# Patient Record
Sex: FEMALE | Race: WHITE | Employment: FULL TIME | URBAN - METROPOLITAN AREA
[De-identification: names, ages, dates, MRNs, and addresses within clinical notes are randomized per-mention and may not be internally consistent; named-entity substitution may affect disease eponyms.]

---

## 2018-01-25 ENCOUNTER — HOSPITAL ENCOUNTER (EMERGENCY)
Age: 43
Discharge: HOME | End: 2018-01-25
Attending: EMERGENCY MEDICINE | Admitting: EMERGENCY MEDICINE
Payer: SELF-PAY

## 2018-01-25 VITALS
TEMPERATURE: 98.2 F | WEIGHT: 152 LBS | BODY MASS INDEX: 24.43 KG/M2 | HEART RATE: 67 BPM | DIASTOLIC BLOOD PRESSURE: 72 MMHG | OXYGEN SATURATION: 100 % | SYSTOLIC BLOOD PRESSURE: 135 MMHG | HEIGHT: 66 IN | RESPIRATION RATE: 16 BRPM

## 2018-01-25 DIAGNOSIS — N30.00 ACUTE CYSTITIS WITHOUT HEMATURIA: Primary | ICD-10-CM

## 2018-01-25 LAB
ANION GAP SERPL CALC-SCNC: 9 MMOL/L (ref 3–18)
APPEARANCE UR: ABNORMAL
BACTERIA URNS QL MICRO: ABNORMAL /HPF
BILIRUB UR QL: NEGATIVE
BUN SERPL-MCNC: 8 MG/DL (ref 7–18)
BUN/CREAT SERPL: 17 (ref 12–20)
CALCIUM SERPL-MCNC: 8.6 MG/DL (ref 8.5–10.1)
CHLORIDE SERPL-SCNC: 108 MMOL/L (ref 100–108)
CO2 SERPL-SCNC: 23 MMOL/L (ref 21–32)
COLOR UR: YELLOW
CREAT SERPL-MCNC: 0.46 MG/DL (ref 0.6–1.3)
EPITH CASTS URNS QL MICRO: ABNORMAL /LPF (ref 0–5)
GLUCOSE SERPL-MCNC: 80 MG/DL (ref 74–99)
GLUCOSE UR STRIP.AUTO-MCNC: NEGATIVE MG/DL
HCG UR QL: NEGATIVE
HGB UR QL STRIP: NEGATIVE
KETONES UR QL STRIP.AUTO: NEGATIVE MG/DL
LEUKOCYTE ESTERASE UR QL STRIP.AUTO: ABNORMAL
NITRITE UR QL STRIP.AUTO: NEGATIVE
PH UR STRIP: 5.5 [PH] (ref 5–8)
POTASSIUM SERPL-SCNC: 4.5 MMOL/L (ref 3.5–5.5)
PROT UR STRIP-MCNC: NEGATIVE MG/DL
RBC #/AREA URNS HPF: 0 /HPF (ref 0–5)
SERVICE CMNT-IMP: NORMAL
SODIUM SERPL-SCNC: 140 MMOL/L (ref 136–145)
SP GR UR REFRACTOMETRY: 1.01 (ref 1–1.03)
UROBILINOGEN UR QL STRIP.AUTO: 1 EU/DL (ref 0.2–1)
WBC URNS QL MICRO: ABNORMAL /HPF (ref 0–4)
WET PREP GENITAL: NORMAL

## 2018-01-25 PROCEDURE — 99284 EMERGENCY DEPT VISIT MOD MDM: CPT

## 2018-01-25 PROCEDURE — 81001 URINALYSIS AUTO W/SCOPE: CPT | Performed by: EMERGENCY MEDICINE

## 2018-01-25 PROCEDURE — 80048 BASIC METABOLIC PNL TOTAL CA: CPT | Performed by: EMERGENCY MEDICINE

## 2018-01-25 PROCEDURE — 87491 CHLMYD TRACH DNA AMP PROBE: CPT | Performed by: EMERGENCY MEDICINE

## 2018-01-25 PROCEDURE — 87210 SMEAR WET MOUNT SALINE/INK: CPT | Performed by: EMERGENCY MEDICINE

## 2018-01-25 PROCEDURE — 81025 URINE PREGNANCY TEST: CPT | Performed by: EMERGENCY MEDICINE

## 2018-01-25 PROCEDURE — 74011250637 HC RX REV CODE- 250/637: Performed by: EMERGENCY MEDICINE

## 2018-01-25 RX ORDER — PHENAZOPYRIDINE HYDROCHLORIDE 100 MG/1
200 TABLET, FILM COATED ORAL ONCE
Status: COMPLETED | OUTPATIENT
Start: 2018-01-25 | End: 2018-01-25

## 2018-01-25 RX ORDER — ACETAMINOPHEN 500 MG
1000 TABLET ORAL
Status: COMPLETED | OUTPATIENT
Start: 2018-01-25 | End: 2018-01-25

## 2018-01-25 RX ORDER — PHENAZOPYRIDINE HYDROCHLORIDE 200 MG/1
200 TABLET, FILM COATED ORAL 3 TIMES DAILY
Qty: 6 TAB | Refills: 0 | Status: SHIPPED | OUTPATIENT
Start: 2018-01-25 | End: 2018-01-27

## 2018-01-25 RX ORDER — SULFAMETHOXAZOLE AND TRIMETHOPRIM 800; 160 MG/1; MG/1
1 TABLET ORAL 2 TIMES DAILY
Qty: 6 TAB | Refills: 0 | Status: SHIPPED | OUTPATIENT
Start: 2018-01-25 | End: 2018-01-28

## 2018-01-25 RX ADMIN — PHENAZOPYRIDINE HYDROCHLORIDE 200 MG: 100 TABLET ORAL at 14:39

## 2018-01-25 RX ADMIN — ACETAMINOPHEN 1000 MG: 500 TABLET ORAL at 14:39

## 2018-01-25 NOTE — ED PROVIDER NOTES
HPI Comments: Chief Complaint: dysuria  Duration:  3 days  Timing:  constant  Location: bladder  Quality: burning  Severity: moderate  Modifying Factors: worse with urination  Associated Symptoms: none    41 yo F c/o dysuria and frequency x 3 days. Has been taking pyridium and amoxicillin without improvement in sx. Would also like to be checked for STDs while she is here. Denies fever, n/v, back pain, abdominal pain. No other complaints. Patient is a 40 y.o. female presenting with urinary pain. Urinary Pain    Associated symptoms include frequency. Pertinent negatives include no chills and no vaginal discharge. Past Medical History:   Diagnosis Date    Anemia        History reviewed. No pertinent surgical history. History reviewed. No pertinent family history. Social History     Social History    Marital status: SINGLE     Spouse name: N/A    Number of children: N/A    Years of education: N/A     Occupational History    Not on file. Social History Main Topics    Smoking status: Never Smoker    Smokeless tobacco: Never Used    Alcohol use Not on file    Drug use: Not on file    Sexual activity: Not on file     Other Topics Concern    Not on file     Social History Narrative         ALLERGIES: Review of patient's allergies indicates no known allergies. Review of Systems   Constitutional: Negative for chills and fever. Genitourinary: Positive for dysuria and frequency. Negative for vaginal discharge. All other systems reviewed and are negative. Vitals:    01/25/18 1414   BP: 135/72   Pulse: 67   Resp: 16   Temp: 98.2 °F (36.8 °C)   SpO2: 100%   Weight: 68.9 kg (152 lb)   Height: 5' 6\" (1.676 m)            Physical Exam   Constitutional: She is oriented to person, place, and time. She appears well-developed and well-nourished. No distress. HENT:   Head: Normocephalic and atraumatic. Eyes: Conjunctivae are normal.   Neck: Normal range of motion. Neck supple. Cardiovascular: Normal rate, regular rhythm and normal heart sounds. Pulmonary/Chest: Effort normal and breath sounds normal. No respiratory distress. She has no wheezes. She has no rales. Genitourinary: Vagina normal and uterus normal. There is no tenderness or lesion on the right labia. There is no tenderness on the left labia. Cervix exhibits no motion tenderness. Right adnexum displays no tenderness and no fullness. Left adnexum displays no tenderness and no fullness. Genitourinary Comments: Tech present for exam   Musculoskeletal: Normal range of motion. Neurological: She is alert and oriented to person, place, and time. Skin: Skin is warm and dry. Psychiatric: She has a normal mood and affect. Her behavior is normal. Judgment and thought content normal.   Nursing note and vitals reviewed. MDM  Number of Diagnoses or Management Options  Acute cystitis without hematuria:     ED Course       Procedures    -------------------------------------------------------------------------------------------------------------------     EKG INTERPRETATIONS:      RADIOLOGY RESULTS:   No orders to display       LABORATORY RESULTS:  No results found for this or any previous visit (from the past 12 hour(s)). PROGRESS NOTES:    Pt well appearing and in NAD. UA c/w UTI. Other labs unremarkable. GC/chlam pending. Will d/h to f/u with PCP for further eval.     Lengthy D/W pt regarding possible worsening of pt's condition, need for follow up and strict ED return instructions for any worsening symptoms. DISPOSITION:  ED DIAGNOSIS & DISPOSITION INFORMATION  Diagnosis:   1.  Acute cystitis without hematuria          Disposition: home    Follow-up Information     Follow up With Details Comments [facility] Call To make a follow up appointment [address] EMERGENCY DEPT  Immediately if symptoms worsen 150 [facility] [address] [place]  [phone]          Discharge Medication List as of 1/25/2018  3:34 PM      START taking these medications    Details   trimethoprim-sulfamethoxazole (BACTRIM DS) 160-800 mg per tablet Take 1 Tab by mouth two (2) times a day for 3 days. , Print, Disp-6 Tab, R-0      phenazopyridine (PYRIDIUM) 200 mg tablet Take 1 Tab by mouth three (3) times daily for 2 days. , Print, Disp-6 Tab, R-0         CONTINUE these medications which have NOT CHANGED    Details   ferrous sulfate (IRON, FERROUS SULFATE,) 325 mg (65 mg iron) tablet Take 1 tablet by mouth three (3) times daily (with meals). , Print, Disp-60 tablet, R-0

## 2018-01-25 NOTE — DISCHARGE INSTRUCTIONS

## 2018-01-26 LAB
C TRACH RRNA SPEC QL NAA+PROBE: NEGATIVE
N GONORRHOEA RRNA SPEC QL NAA+PROBE: NEGATIVE
SPECIMEN SOURCE: NORMAL

## 2018-11-16 ENCOUNTER — HOSPITAL ENCOUNTER (EMERGENCY)
Age: 43
Discharge: HOME | End: 2018-11-16
Attending: EMERGENCY MEDICINE
Payer: SELF-PAY

## 2018-11-16 ENCOUNTER — APPOINTMENT (OUTPATIENT)
Dept: CT IMAGING | Age: 43
End: 2018-11-16
Attending: EMERGENCY MEDICINE
Payer: SELF-PAY

## 2018-11-16 VITALS
TEMPERATURE: 98.3 F | HEIGHT: 66 IN | SYSTOLIC BLOOD PRESSURE: 101 MMHG | DIASTOLIC BLOOD PRESSURE: 53 MMHG | OXYGEN SATURATION: 100 % | BODY MASS INDEX: 24.59 KG/M2 | WEIGHT: 153 LBS | RESPIRATION RATE: 20 BRPM | HEART RATE: 73 BPM

## 2018-11-16 DIAGNOSIS — D50.9 IRON DEFICIENCY ANEMIA, UNSPECIFIED IRON DEFICIENCY ANEMIA TYPE: Primary | ICD-10-CM

## 2018-11-16 DIAGNOSIS — Z51.89 ENCOUNTER FOR BLOOD TRANSFUSION: ICD-10-CM

## 2018-11-16 LAB
ANION GAP SERPL CALC-SCNC: 7 MMOL/L (ref 3–18)
APPEARANCE UR: CLEAR
BASOPHILS # BLD: 0 K/UL (ref 0–0.1)
BASOPHILS NFR BLD: 1 % (ref 0–2)
BILIRUB UR QL: NEGATIVE
BUN SERPL-MCNC: 6 MG/DL (ref 7–18)
BUN/CREAT SERPL: 11 (ref 12–20)
CALCIUM SERPL-MCNC: 8.7 MG/DL (ref 8.5–10.1)
CHLORIDE SERPL-SCNC: 106 MMOL/L (ref 100–108)
CO2 SERPL-SCNC: 27 MMOL/L (ref 21–32)
COLOR UR: YELLOW
CREAT SERPL-MCNC: 0.55 MG/DL (ref 0.6–1.3)
DIFFERENTIAL METHOD BLD: ABNORMAL
EOSINOPHIL # BLD: 0.3 K/UL (ref 0–0.4)
EOSINOPHIL NFR BLD: 6 % (ref 0–5)
ERYTHROCYTE [DISTWIDTH] IN BLOOD BY AUTOMATED COUNT: 25.9 % (ref 11.6–14.5)
GLUCOSE SERPL-MCNC: 84 MG/DL (ref 74–99)
GLUCOSE UR STRIP.AUTO-MCNC: NEGATIVE MG/DL
HCG SERPL QL: NEGATIVE
HCG UR QL: NEGATIVE
HCT VFR BLD AUTO: 20.4 % (ref 35–45)
HGB BLD-MCNC: 5.1 G/DL (ref 12–16)
HGB UR QL STRIP: NEGATIVE
KETONES UR QL STRIP.AUTO: NEGATIVE MG/DL
LEUKOCYTE ESTERASE UR QL STRIP.AUTO: NEGATIVE
LYMPHOCYTES # BLD: 1.1 K/UL (ref 0.9–3.6)
LYMPHOCYTES NFR BLD: 21 % (ref 21–52)
MCH RBC QN AUTO: 15.7 PG (ref 24–34)
MCHC RBC AUTO-ENTMCNC: 25 G/DL (ref 31–37)
MCV RBC AUTO: 63 FL (ref 74–97)
MONOCYTES # BLD: 0.4 K/UL (ref 0.05–1.2)
MONOCYTES NFR BLD: 7 % (ref 3–10)
NEUTS SEG # BLD: 3.4 K/UL (ref 1.8–8)
NEUTS SEG NFR BLD: 65 % (ref 40–73)
NITRITE UR QL STRIP.AUTO: NEGATIVE
PH UR STRIP: 7.5 [PH] (ref 5–8)
PLATELET # BLD AUTO: 348 K/UL (ref 135–420)
POTASSIUM SERPL-SCNC: 4.1 MMOL/L (ref 3.5–5.5)
PROT UR STRIP-MCNC: NEGATIVE MG/DL
RBC # BLD AUTO: 3.24 M/UL (ref 4.2–5.3)
SODIUM SERPL-SCNC: 140 MMOL/L (ref 136–145)
SP GR UR REFRACTOMETRY: <1.005 (ref 1–1.03)
TROPONIN I SERPL-MCNC: 0.02 NG/ML (ref 0–0.04)
UROBILINOGEN UR QL STRIP.AUTO: 0.2 EU/DL (ref 0.2–1)
WBC # BLD AUTO: 5.3 K/UL (ref 4.6–13.2)

## 2018-11-16 PROCEDURE — 96374 THER/PROPH/DIAG INJ IV PUSH: CPT

## 2018-11-16 PROCEDURE — 84703 CHORIONIC GONADOTROPIN ASSAY: CPT

## 2018-11-16 PROCEDURE — P9016 RBC LEUKOCYTES REDUCED: HCPCS

## 2018-11-16 PROCEDURE — 99285 EMERGENCY DEPT VISIT HI MDM: CPT

## 2018-11-16 PROCEDURE — 81003 URINALYSIS AUTO W/O SCOPE: CPT

## 2018-11-16 PROCEDURE — 77030013169 SET IV BLD ICUM -A

## 2018-11-16 PROCEDURE — 86920 COMPATIBILITY TEST SPIN: CPT

## 2018-11-16 PROCEDURE — 86901 BLOOD TYPING SEROLOGIC RH(D): CPT

## 2018-11-16 PROCEDURE — 80048 BASIC METABOLIC PNL TOTAL CA: CPT

## 2018-11-16 PROCEDURE — 85025 COMPLETE CBC W/AUTO DIFF WBC: CPT

## 2018-11-16 PROCEDURE — 84484 ASSAY OF TROPONIN QUANT: CPT

## 2018-11-16 PROCEDURE — 81025 URINE PREGNANCY TEST: CPT

## 2018-11-16 PROCEDURE — 36430 TRANSFUSION BLD/BLD COMPNT: CPT

## 2018-11-16 PROCEDURE — 93005 ELECTROCARDIOGRAM TRACING: CPT

## 2018-11-16 PROCEDURE — 70450 CT HEAD/BRAIN W/O DYE: CPT

## 2018-11-16 PROCEDURE — 74011250636 HC RX REV CODE- 250/636: Performed by: PHYSICIAN ASSISTANT

## 2018-11-16 RX ORDER — DIPHENHYDRAMINE HYDROCHLORIDE 50 MG/ML
25 INJECTION, SOLUTION INTRAMUSCULAR; INTRAVENOUS
Status: COMPLETED | OUTPATIENT
Start: 2018-11-16 | End: 2018-11-16

## 2018-11-16 RX ORDER — SODIUM CHLORIDE 9 MG/ML
250 INJECTION, SOLUTION INTRAVENOUS AS NEEDED
Status: DISCONTINUED | OUTPATIENT
Start: 2018-11-16 | End: 2018-11-16 | Stop reason: HOSPADM

## 2018-11-16 RX ORDER — LANOLIN ALCOHOL/MO/W.PET/CERES
325 CREAM (GRAM) TOPICAL
Qty: 60 TAB | Refills: 0 | Status: SHIPPED | OUTPATIENT
Start: 2018-11-16 | End: 2019-05-13

## 2018-11-16 RX ADMIN — DIPHENHYDRAMINE HYDROCHLORIDE 25 MG: 50 INJECTION, SOLUTION INTRAMUSCULAR; INTRAVENOUS at 19:08

## 2018-11-16 NOTE — ED PROVIDER NOTES
EMERGENCY DEPARTMENT HISTORY AND PHYSICAL EXAM 
 
Date: 11/16/2018 Patient Name: Connie Galindo History of Presenting Illness Chief Complaint Patient presents with  Dizziness  Palpitations History Provided By: Patient Chief Complaint: lightheadedness, fatigue, palpitations Duration: several days Timing:  acute on chronic Location: generalized Modifying Factors: has a history of anemia. Had a transfusion about 5 years ago when living in a [facility]. Takes 200 mg of a iron equivalent  From [facility]. Recent heavy period lasting for 7 days starting on November 2 Associated Symptoms: denies rectal bleeding, abd pain, chest pain, + mild SOB especially when moving around, no leg swelling, no syncope Additional History (Context): Connie Galindo is a 44 y.o. female with anemia who presents with C/O lightheadedness, generalized fatigue, SOB with exertion that began about three or four days ago. She has a history of iron deficiency anemia and has had a transfusion in the past -- about 5 years ago in [facility]. Denies any syncope. Admits to a heavy period that started on Nov 2 and lasted 7 days. Her symptoms started after completing her period. Denies chest pain, syncope. Denies rectal bleeding. PCP: MARILY Thurston Current Facility-Administered Medications Medication Dose Route Frequency Provider Last Rate Last Dose  
 0.9% sodium chloride infusion 250 mL  250 mL IntraVENous PRN [place], [place] Current Outpatient Medications Medication Sig Dispense Refill  ferrous sulfate (IRON, FERROUS SULFATE,) 325 mg (65 mg iron) tablet Take 1 Tab by mouth three (3) times daily (with meals). 60 Tab 0 Past History Past Medical History: 
Past Medical History:  
Diagnosis Date  Anemia  Anemia Past Surgical History: 
History reviewed. No pertinent surgical history. Family History: 
History reviewed. No pertinent family history. Social History: 
Social History Tobacco Use  Smoking status: Never Smoker  Smokeless tobacco: Never Used Substance Use Topics  Alcohol use: Not on file  Drug use: Not on file Allergies: 
No Known Allergies Review of Systems Review of Systems Constitutional: Positive for fatigue. Negative for chills and fever. Respiratory: Positive for shortness of breath. Negative for chest tightness and wheezing. Cardiovascular: Negative for chest pain, palpitations and leg swelling. Gastrointestinal: Negative for abdominal pain, anal bleeding, blood in stool, diarrhea, nausea and vomiting. Genitourinary: Hx of heavy vaginal bleeding Musculoskeletal: Negative. Skin: Negative. Neurological: Negative. All other systems reviewed and are negative. Physical Exam  
 
Vitals:  
 11/16/18 1630 11/16/18 1641 11/16/18 1700 11/16/18 1800 BP: 107/55  105/55 99/67 Pulse: 76  71 68 Resp: 19 16 16 Temp:  98.3 °F (36.8 °C) SpO2: 100%  100% 100% Weight:      
Height:      
 
Physical Exam  
Constitutional: She is oriented to person, place, and time. She appears well-developed and well-nourished. No distress. HENT:  
Head: Normocephalic and atraumatic. Eyes: EOM are normal. Pupils are equal, round, and reactive to light. Pale conjunctiva Neck: Neck supple. Cardiovascular: Normal rate and regular rhythm. Exam reveals no gallop and no friction rub. No murmur heard. Pulmonary/Chest: Effort normal and breath sounds normal. No respiratory distress. She has no wheezes. She has no rales. Abdominal: Soft. Bowel sounds are normal. She exhibits no distension and no mass. There is no tenderness. There is no rebound and no guarding. Genitourinary:  
Genitourinary Comments: Declines SLAVA Musculoskeletal: Normal range of motion. She exhibits no tenderness or deformity. Lymphadenopathy:  
  She has no cervical adenopathy. Neurological: She is alert and oriented to person, place, and time. No cranial nerve deficit. Skin: Skin is warm and dry. No rash noted. She is not diaphoretic. There is pallor. Psychiatric: She has a normal mood and affect. Her behavior is normal.  
Nursing note and vitals reviewed. Diagnostic Study Results Labs - Recent Results (from the past 12 hour(s)) EKG, 12 LEAD, INITIAL Collection Time: 11/16/18  2:54 PM  
Result Value Ref Range Ventricular Rate 81 BPM  
 Atrial Rate 81 BPM  
 P-R Interval 122 ms QRS Duration 84 ms Q-T Interval 380 ms QTC Calculation (Bezet) 441 ms Calculated P Axis 36 degrees Calculated R Axis 61 degrees Calculated T Axis 17 degrees Diagnosis Normal sinus rhythm Normal ECG No previous ECGs available CBC WITH AUTOMATED DIFF Collection Time: 11/16/18  3:16 PM  
Result Value Ref Range WBC 5.3 4.6 - 13.2 K/uL  
 RBC 3.24 (L) 4.20 - 5.30 M/uL HGB 5.1 (LL) 12.0 - 16.0 g/dL HCT 20.4 (L) 35.0 - 45.0 % MCV 63.0 (L) 74.0 - 97.0 FL  
 MCH 15.7 (L) 24.0 - 34.0 PG  
 MCHC 25.0 (L) 31.0 - 37.0 g/dL RDW 25.9 (H) 11.6 - 14.5 % PLATELET 851 965 - 736 K/uL NEUTROPHILS 65 40 - 73 % LYMPHOCYTES 21 21 - 52 % MONOCYTES 7 3 - 10 % EOSINOPHILS 6 (H) 0 - 5 % BASOPHILS 1 0 - 2 %  
 ABS. NEUTROPHILS 3.4 1.8 - 8.0 K/UL  
 ABS. LYMPHOCYTES 1.1 0.9 - 3.6 K/UL  
 ABS. MONOCYTES 0.4 0.05 - 1.2 K/UL  
 ABS. EOSINOPHILS 0.3 0.0 - 0.4 K/UL  
 ABS. BASOPHILS 0.0 0.0 - 0.1 K/UL  
 DF AUTOMATED    
TYPE & SCREEN Collection Time: 11/16/18  3:16 PM  
Result Value Ref Range Crossmatch Expiration 11/19/2018 ABO/Rh(D) A POSITIVE Antibody screen NEG   
 CALLED TO: IRINEO Escobar, AT 1618 ON 11/16/18 BY [id number] [facility] Unit number [id number] Blood component type  LR,1 Unit division 00 Status of unit ALLOCATED Crossmatch result Compatible Unit number [id number]  Blood component type  LR,1   
 Unit division 00 Status of unit ISSUED Crossmatch result Compatible METABOLIC PANEL, BASIC Collection Time: 11/16/18  3:16 PM  
Result Value Ref Range Sodium 140 136 - 145 mmol/L Potassium 4.1 3.5 - 5.5 mmol/L Chloride 106 100 - 108 mmol/L  
 CO2 27 21 - 32 mmol/L Anion gap 7 3.0 - 18 mmol/L Glucose 84 74 - 99 mg/dL BUN 6 (L) 7.0 - 18 MG/DL Creatinine 0.55 (L) 0.6 - 1.3 MG/DL  
 BUN/Creatinine ratio 11 (L) 12 - 20 GFR est AA >60 >60 ml/min/1.73m2 GFR est non-AA >60 >60 ml/min/1.73m2 Calcium 8.7 8.5 - 10.1 MG/DL  
HCG QL SERUM Collection Time: 11/16/18  3:16 PM  
Result Value Ref Range HCG, Ql. NEGATIVE  NEG    
TROPONIN I Collection Time: 11/16/18  3:16 PM  
Result Value Ref Range Troponin-I, Qt. 0.02 0.0 - 0.045 NG/ML  
URINALYSIS W/ RFLX MICROSCOPIC Collection Time: 11/16/18  3:25 PM  
Result Value Ref Range Color YELLOW Appearance CLEAR Specific gravity <1.005 (L) 1.005 - 1.030  
 pH (UA) 7.5 5.0 - 8.0 Protein NEGATIVE  NEG mg/dL Glucose NEGATIVE  NEG mg/dL Ketone NEGATIVE  NEG mg/dL Bilirubin NEGATIVE  NEG Blood NEGATIVE  NEG Urobilinogen 0.2 0.2 - 1.0 EU/dL Nitrites NEGATIVE  NEG Leukocyte Esterase NEGATIVE  NEG    
HCG URINE, QL. - POC Collection Time: 11/16/18  3:43 PM  
Result Value Ref Range Pregnancy test,urine (POC) NEGATIVE  NEG Radiologic Studies -  
CT HEAD WO CONT Final Result CT Results  (Last 48 hours)  
          
 11/16/18 1659  CT HEAD WO CONT Final result Impression:  IMPRESSION:  
   
No acute intracranial abnormalities. Chronic paranasal sinusitis Narrative:  EXAM: CT head INDICATION: Syncope COMPARISON: None. TECHNIQUE: Axial CT imaging of the head was performed without intravenous  
contrast. One or more dose reduction techniques were used on this CT: automated exposure control, adjustment of the mAs and/or kVp according to patient size,  
and iterative reconstruction techniques. The specific techniques used on this CT exam have been documented in the patient's electronic medical record.  
   
_______________ FINDINGS:  
   
BRAIN AND POSTERIOR FOSSA: The sulci, folia, ventricles and basal cisterns are  
within normal limits for the patient's age. There is no intracranial hemorrhage,  
mass effect, or midline shift. There are no areas of abnormal parenchymal  
attenuation. EXTRA-AXIAL SPACES AND MENINGES: There are no abnormal extra-axial fluid  
collections. CALVARIUM: Intact. SINUSES: There is mucoperiosteal thickening in the ethmoid sinuses, right  
sphenoid and right maxillary sinus suggesting chronic paranasal sinus disease. Latosha Weaver OTHER: None.  
   
_______________ CXR Results  (Last 48 hours) None Medical Decision Making I am the first provider for this patient. I reviewed the vital signs, available nursing notes, past medical history, past surgical history, family history and social history. Vital Signs-Reviewed the patient's vital signs. EKG: Interpreted by the EP Time Interpreted:  
 Rate:  
 Rhythm: 
 Interpretation: 
 Comparison: 
 
Records Reviewed: Nursing Notes and Old Medical Records Procedures: 
Procedures Provider Notes (Medical Decision Making):  
Discussed with Dr. Yana Arenas, she agrees with the plan of giving patient one unit of blood. [facility] [place] Approached by VIELKA Kaiser, stating patient got a CT. Reviewed orders, head CT ordered by Dr. Yana Arenas in error. Dr. Yana Arenas went and saw patient and discussed this. Head CT was negative even though not indicated in treatment plan today [facility] [place] Consult:  Dr. Enoch Mckeon attending. He agrees with plan for discharge. Impression:  Iron deficiency anemia, has just had a heavy period.   This is something that has happened before. Transfused one unit of blood. Patient did well with the transfusion.  has gotten patient close outpatient follow up. She has been urged to return immediately if she is worsening. Cesar Reeder I have spent 31 minutes of critical care time involved in lab review, consultations with specialist, family decision-making, and documentation. During this entire length of time I was immediately available to the patient. Critical Care: The reason for providing this level of medical care for this critically ill patient was due a critical illness that impaired one or more vital organ systems such that there was a high probability of imminent or life threatening deterioration in the patients condition. This care involved high complexity decision making to assess, manipulate, and support vital system functions, to treat this degreee vital organ system failure and to prevent further life threatening deterioration of the patients condition. MED RECONCILIATION: 
Current Facility-Administered Medications Medication Dose Route Frequency  0.9% sodium chloride infusion 250 mL  250 mL IntraVENous PRN Current Outpatient Medications Medication Sig  
 ferrous sulfate (IRON, FERROUS SULFATE,) 325 mg (65 mg iron) tablet Take 1 Tab by mouth three (3) times daily (with meals). Disposition: 
Discharged DISCHARGE NOTE:  
 
Pt has been reexamined. Patient has no new complaints, changes, or physical findings. Care plan outlined and precautions discussed. Results of labs were reviewed with the patient. All medications were reviewed with the patient; will d/c home with iron. All of pt's questions and concerns were addressed. Patient was instructed and agrees to follow up with PCP, as well as to return to the ED upon further deterioration. Patient is ready to go home. Follow-up Information Follow up With Specialties Details Why Contact Info [facility] On 11/21/2018 at 9:00 am [address] [place] [address] [phone] 
[phone] [facility] On 11/21/2018 at 9:00 am [address] [place] [address] [phone] 
[phone] [facility]  Call for INSURANCE/NATALIE assistance [phone] Current Discharge Medication List  
  
CONTINUE these medications which have CHANGED Details  
ferrous sulfate (IRON, FERROUS SULFATE,) 325 mg (65 mg iron) tablet Take 1 Tab by mouth three (3) times daily (with meals). Qty: 60 Tab, Refills: 0 Diagnosis Clinical Impression: 1. Iron deficiency anemia, unspecified iron deficiency anemia type 2. Encounter for blood transfusion

## 2018-11-16 NOTE — ED NOTES
H/o anemia requiring transfusion in past. Feels weak and lightheaded and palpitations. HA, no CP. I performed a brief evaluation, including history and physical, of the patient here in triage and I have determined that pt will need further treatment and evaluation from the main side ER physician. I have placed initial orders to help in expediting patients care. November 16, 2018 at 2:43 PM - Lucy Sender YOHANNES Ledezma Visit Vitals /51 (BP 1 Location: Right arm, BP Patient Position: At rest) Pulse 79 Temp 98.8 °F (37.1 °C) Resp 16 Ht 5' 6\" (1.676 m) Wt 69.4 kg (153 lb) SpO2 99% BMI 24.69 kg/m²

## 2018-11-16 NOTE — ED TRIAGE NOTES
Hx anemia. Has had transfusion in past. Feels same. Light headed, dizzy, occassional palpitations. SOB .

## 2018-11-17 NOTE — ED NOTES
I have reviewed discharge instructions with the patient. The patient verbalized understanding. Pt ambulatory out of ED.

## 2018-11-17 NOTE — DISCHARGE INSTRUCTIONS
Iron Deficiency Anemia: Care Instructions  Your Care Instructions    Anemia means that you do not have enough red blood cells. Red blood cells carry oxygen around your body. When you have anemia, it can make you pale, weak, and tired. Many things can cause anemia. The most common cause is loss of blood. This can happen if you have heavy menstrual periods. It can also happen if you have bleeding in your stomach or bowel. You can also get anemia if you don't have enough iron in your diet or if it's hard for your body to absorb iron. In some cases, pregnancy causes anemia. That's because a pregnant woman needs more iron. Your doctor may do more tests to find the cause of your anemia. If a disease or other health problem is causing it, your doctor will treat that problem. It's important to follow up with your doctor to make sure that your iron level returns to normal.  Follow-up care is a key part of your treatment and safety. Be sure to make and go to all appointments, and call your doctor if you are having problems. It's also a good idea to know your test results and keep a list of the medicines you take. How can you care for yourself at home? · If your doctor recommended iron pills, take them as directed. ? Try to take the pills on an empty stomach. You can do this about 1 hour before or 2 hours after meals. But you may need to take iron with food to avoid an upset stomach. ? Do not take antacids or drink milk or anything with caffeine within 2 hours of when you take your iron. They can keep your body from absorbing the iron well. ? Vitamin C helps your body absorb iron. You may want to take iron pills with a glass of orange juice or some other food high in vitamin C.  ? Iron pills may cause stomach problems. These include heartburn, nausea, diarrhea, constipation, and cramps. It can help to drink plenty of fluids and include fruits, vegetables, and fiber in your diet.   ? It's normal for iron pills to make your stool a greenish or grayish black. But internal bleeding can also cause dark stool. So it's important to tell your doctor about any color changes. ? Call your doctor if you think you are having a problem with your iron pills. Even after you start to feel better, it will take several months for your body to build up its supply of iron. ? If you miss a pill, don't take a double dose. ? Keep iron pills out of the reach of small children. Too much iron can be very dangerous. · Eat foods with a lot of iron. These include red meat, shellfish, poultry, and eggs. They also include beans, raisins, whole-grain bread, and leafy green vegetables. · Steam your vegetables. This is the best way to prepare them if you want to get as much iron as possible. · Be safe with medicines. Do not take nonsteroidal anti-inflammatory pain relievers unless your doctor tells you to. These include aspirin, naproxen (Aleve), and ibuprofen (Advil, Motrin). · Liquid iron can stain your teeth. But you can mix it with water or juice and drink it with a straw. Then it won't get on your teeth. When should you call for help? Call 911 anytime you think you may need emergency care. For example, call if:    · You passed out (lost consciousness).    Call your doctor now or seek immediate medical care if:    · You are short of breath.     · You are dizzy or light-headed, or you feel like you may faint.     · You have new or worse bleeding.    Watch closely for changes in your health, and be sure to contact your doctor if:    · You feel weaker or more tired than usual.     · You do not get better as expected. Where can you learn more? Go to http://henny-vincent.info/. Enter P129 in the search box to learn more about \"Iron Deficiency Anemia: Care Instructions. \"  Current as of: May 7, 2018  Content Version: 11.8  © 0228-2051 Healthwise, Incorporated.  Care instructions adapted under license by [employer] (which disclaims liability or warranty for this information). If you have questions about a medical condition or this instruction, always ask your healthcare professional. [address] any warranty or liability for your use of this information. Learning About Blood Transfusions  What is a blood transfusion? Blood transfusion is a medical treatment to replace the blood or parts of blood that your body has lost. The blood goes through a tube from a bag to an intravenous (IV) catheter and into your vein. You may need a blood transfusion after losing blood from an injury, a major surgery, an illness that causes bleeding, or an illness that destroys blood cells. Transfusions are also used to give you the parts of blood--such as platelets, plasma, or substances that cause clotting--that your body needs to fight an illness or stop bleeding. How is a blood transfusion done? Before you receive a blood transfusion, your blood is tested to find out what your blood type is. Blood or blood parts that are a match with your blood type are ordered by your doctor. Blood is typed as A, B, AB, or O. It is also typed as Rh-positive or Rh-negative. Your blood is also screened to look for antibodies that might react with the blood that is given to you. The blood you are getting is checked and rechecked to make sure that it's the right type for you. A sample of your blood is mixed with a sample of the blood you will receive to check for problems. Before actually giving you the transfusion, a doctor and nurses will look at the label on the package of blood and compare it to your hospital ID bracelet and medical records. The transfusion begins only when all agree that this is the correct blood and that you are the correct person to receive it. To receive the transfusion, you will have an intravenous (IV) catheter inserted into a vein.  A tube connects the catheter to the bag containing the blood, which is placed higher than your body. The blood then flows slowly into your vein. A doctor or nurse will check you several times during the transfusion to watch for a reaction or other problems. What are the possible risks? Blood transfusions have many benefits and are often life-saving. But they also have a few risks. Possible risks include:  · Your body's reaction to receiving new blood. This may include:  ? Fever. ? Allergic reactions. ? Breathing problems. · An infection from the blood. This risk is small because of the strict rules placed on handling and storing blood. Getting a viral infection, such as HIV or hepatitis B or C, through blood transfusions has become very rare. The U.S. Food and Drug Administration (FDA) enforces strict guidelines on the collection, testing, storage, and use of blood. · Getting the wrong blood type by accident. Severe reactions, which can be life-threatening, are very rare. What can you expect after a blood transfusion? Here are some things you can do at home to help prevent infection at the transfusion site:  · Wash the area daily with warm, soapy water, and pat it dry. Don't use hydrogen peroxide or alcohol, which can slow healing. You may cover the area with a gauze bandage if it weeps or rubs against clothing. Change the bandage every day. · Keep the area clean and dry. When should you call for help? Call 911 anytime you think you may need emergency care. For example, call if:  · You have severe trouble breathing. Call your doctor now or seek immediate medical care if:  · You have a fever. · You feel weaker or more tired than usual.  · You have a yellow tint to your skin or the whites of your eyes. Watch closely for changes in your health, and be sure to contact your doctor if you have any problems. Follow-up care is a key part of your treatment and safety. Be sure to make and go to all appointments, and call your doctor if you are having problems.  It's also a good idea to know your test results and keep a list of the medicines you take. Where can you learn more? Go to http://henny-vincent.info/. Enter V588 in the search box to learn more about \"Learning About Blood Transfusions. \"  Current as of: May 7, 2018  Content Version: 11.8  © 4111-5265 Healthwise, Incorporated. Care instructions adapted under license by [employer] (which disclaims liability or warranty for this information). If you have questions about a medical condition or this instruction, always ask your healthcare professional. [address] any warranty or liability for your use of this information.

## 2018-11-17 NOTE — ED NOTES
Shift change report received from Kaylee Shetty 141 assumed care of pt 
 
-pt finished transfusion, will monitor until D/C

## 2018-11-18 LAB
ATRIAL RATE: 81 BPM
CALCULATED P AXIS, ECG09: 36 DEGREES
CALCULATED R AXIS, ECG10: 61 DEGREES
CALCULATED T AXIS, ECG11: 17 DEGREES
DIAGNOSIS, 93000: NORMAL
P-R INTERVAL, ECG05: 122 MS
Q-T INTERVAL, ECG07: 380 MS
QRS DURATION, ECG06: 84 MS
QTC CALCULATION (BEZET), ECG08: 441 MS
VENTRICULAR RATE, ECG03: 81 BPM

## 2018-11-20 LAB
ABO + RH BLD: NORMAL
BLD PROD TYP BPU: NORMAL
BLD PROD TYP BPU: NORMAL
BLOOD GROUP ANTIBODIES SERPL: NORMAL
BPU ID: NORMAL
BPU ID: NORMAL
CALLED TO:,BCALL1: NORMAL
CROSSMATCH RESULT,%XM: NORMAL
CROSSMATCH RESULT,%XM: NORMAL
SPECIMEN EXP DATE BLD: NORMAL
STATUS OF UNIT,%ST: NORMAL
STATUS OF UNIT,%ST: NORMAL
UNIT DIVISION, %UDIV: 0
UNIT DIVISION, %UDIV: 0

## 2019-05-13 ENCOUNTER — OFFICE VISIT (OUTPATIENT)
Dept: FAMILY MEDICINE CLINIC | Facility: CLINIC | Age: 44
End: 2019-05-13

## 2019-05-13 VITALS
SYSTOLIC BLOOD PRESSURE: 100 MMHG | RESPIRATION RATE: 15 BRPM | WEIGHT: 162.4 LBS | OXYGEN SATURATION: 97 % | DIASTOLIC BLOOD PRESSURE: 60 MMHG | TEMPERATURE: 97.5 F | BODY MASS INDEX: 26.1 KG/M2 | HEIGHT: 66 IN | HEART RATE: 61 BPM

## 2019-05-13 DIAGNOSIS — E66.3 OVERWEIGHT (BMI 25.0-29.9): ICD-10-CM

## 2019-05-13 DIAGNOSIS — D50.0 IRON DEFICIENCY ANEMIA DUE TO CHRONIC BLOOD LOSS: Primary | ICD-10-CM

## 2019-05-13 DIAGNOSIS — Z13.31 SCREENING FOR DEPRESSION: ICD-10-CM

## 2019-05-13 DIAGNOSIS — Z12.39 SCREENING FOR BREAST CANCER: ICD-10-CM

## 2019-05-13 DIAGNOSIS — Z23 ENCOUNTER FOR IMMUNIZATION: ICD-10-CM

## 2019-05-13 PROBLEM — D50.9 IRON DEFICIENCY ANEMIA: Status: ACTIVE | Noted: 2019-05-13

## 2019-05-13 NOTE — PATIENT INSTRUCTIONS
Iron Deficiency Anemia: Care Instructions Your Care Instructions Anemia means that you do not have enough red blood cells. Red blood cells carry oxygen around your body. When you have anemia, it can make you pale, weak, and tired. Many things can cause anemia. The most common cause is loss of blood. This can happen if you have heavy menstrual periods. It can also happen if you have bleeding in your stomach or bowel. You can also get anemia if you don't have enough iron in your diet or if it's hard for your body to absorb iron. In some cases, pregnancy causes anemia. That's because a pregnant woman needs more iron. Your doctor may do more tests to find the cause of your anemia. If a disease or other health problem is causing it, your doctor will treat that problem. It's important to follow up with your doctor to make sure that your iron level returns to normal. 
Follow-up care is a key part of your treatment and safety. Be sure to make and go to all appointments, and call your doctor if you are having problems. It's also a good idea to know your test results and keep a list of the medicines you take. How can you care for yourself at home? · If your doctor recommended iron pills, take them as directed. ? Try to take the pills on an empty stomach. You can do this about 1 hour before or 2 hours after meals. But you may need to take iron with food to avoid an upset stomach. ? Do not take antacids or drink milk or anything with caffeine within 2 hours of when you take your iron. They can keep your body from absorbing the iron well. ? Vitamin C helps your body absorb iron. You may want to take iron pills with a glass of orange juice or some other food high in vitamin C. 
? Iron pills may cause stomach problems. These include heartburn, nausea, diarrhea, constipation, and cramps. It can help to drink plenty of fluids and include fruits, vegetables, and fiber in your diet. ? It's normal for iron pills to make your stool a greenish or grayish black. But internal bleeding can also cause dark stool. So it's important to tell your doctor about any color changes. ? Call your doctor if you think you are having a problem with your iron pills. Even after you start to feel better, it will take several months for your body to build up its supply of iron. ? If you miss a pill, don't take a double dose. ? Keep iron pills out of the reach of small children. Too much iron can be very dangerous. · Eat foods with a lot of iron. These include red meat, shellfish, poultry, and eggs. They also include beans, raisins, whole-grain bread, and leafy green vegetables. · Steam your vegetables. This is the best way to prepare them if you want to get as much iron as possible. · Be safe with medicines. Do not take nonsteroidal anti-inflammatory pain relievers unless your doctor tells you to. These include aspirin, naproxen (Aleve), and ibuprofen (Advil, Motrin). · Liquid iron can stain your teeth. But you can mix it with water or juice and drink it with a straw. Then it won't get on your teeth. When should you call for help? Call 911 anytime you think you may need emergency care. For example, call if: 
  · You passed out (lost consciousness).  
 Call your doctor now or seek immediate medical care if: 
  · You are short of breath.  
  · You are dizzy or light-headed, or you feel like you may faint.  
  · You have new or worse bleeding.  
 Watch closely for changes in your health, and be sure to contact your doctor if: 
  · You feel weaker or more tired than usual.  
  · You do not get better as expected. Where can you learn more? Go to http://henny-vincent.info/. Enter K332 in the search box to learn more about \"Iron Deficiency Anemia: Care Instructions. \" Current as of: May 6, 2018 Content Version: 11.9 © 6213-2319 [employer].  Care instructions adapted under license by [address] (which disclaims liability or warranty for this information). If you have questions about a medical condition or this instruction, always ask your healthcare professional. [address] any warranty or liability for your use of this information.

## 2019-05-13 NOTE — PROGRESS NOTES
History and Physical 
 
Today's Date:  2019 Patient's Name: Becky Cisse Patient's :  1975 History: Chief Complaint Patient presents with [facility]  Anemia  Weight Management Becky Cisse is a 44 y.o. female presenting for initial visit to [facility]. Will obtain records from previous provider to review. Care team updated on connect care. Overweight This is a chronic problem, new to me. This is not at goal. Pt tries to eat a healthy diet. Pt lost weight since the last visit. Iron deficiency anemia This is a chronic problem, new to me. This is not controlled. This is due to menorrhagia/irregular menses. Pt can't tolerate iron pills. 3 most recent PHQ Screens 2019 Little interest or pleasure in doing things Not at all Feeling down, depressed, irritable, or hopeless Nearly every day Total Score PHQ 2 3 Trouble falling or staying asleep, or sleeping too much More than half the days Feeling tired or having little energy More than half the days Poor appetite, weight loss, or overeating Several days Feeling bad about yourself - or that you are a failure or have let yourself or your family down Not at all Trouble concentrating on things such as school, work, reading, or watching TV Not at all Moving or speaking so slowly that other people could have noticed; or the opposite being so fidgety that others notice Not at all Thoughts of being better off dead, or hurting yourself in some way Not at all PHQ 9 Score 8 Past Medical History:  
Diagnosis Date  Anemia  Anemia  Iron deficiency anemia 2019  Overweight (BMI 25.0-29.9) 2019 History reviewed. No pertinent surgical history. reports that she has never smoked. She has never used smokeless tobacco. She reports that she drank alcohol. History reviewed. No pertinent family history. No Known Allergies Problem List:  
  
 Patient Active Problem List  
Diagnosis Code  Iron deficiency anemia D50.9  Overweight (BMI 25.0-29. 9) E66.3 Medications:  
 
Current Outpatient Medications Medication Sig  diph,Pertuss,Acell,,Tet Vac-PF (ADACEL) 2 Lf-(2.5-5-3-5 mcg)-5Lf/0.5 mL susp 0.5 mL by IntraMUSCular route once for 1 dose.  multivitamin (MULTI-DELYN, WELLESSE) liqd Take  by mouth daily. No current facility-administered medications for this visit. Review of Systems:  
General:   fevers, chills Neurologic: dizziness, lightheadedness Eyes:  vision changes, double vision, photophobia Ears:  change in hearing, ear pain, ear discharge, ear ringing Nose:  sneezing, runny nose Mouth/Throat: sore throat, voice change Neck:  pain, stiffness Respiratory: dyspnea at rest, dyspnea on exertion, wheezing, cough, sputum production Cardiovascular:   +chest pain, +palpitations (related to anxiety) Breasts: lumps, discharge, pain, rash Gastrointestinal:  nausea, vomiting Urinary: dysuria, urinary frequency, nocturia, malodorous urine Genital (F): vaginal discharge, ulcerations Musculoskeletal:  joint pain, +back pain Psychiatric: insomnia, +anxiety Endocrine: cold intolerance, heat intolerance Hematologic: easy bruising, easy bleeding Dermatologic: Itching, rash Physical Assessment:  
 
Visit Vitals /60 (BP 1 Location: Left arm, BP Patient Position: Sitting) Pulse 61 Temp 97.5 °F (36.4 °C) (Oral) Resp 15 Ht 5' 6\" (1.676 m) Wt 162 lb 6.4 oz (73.7 kg) LMP 05/13/2019 SpO2 97% BMI 26.21 kg/m² General:   Well-groomed, well-nourished, in no distress, pleasant, appropriate and conversant. Eyes:    PERRL, conjunctiva clear Ears:  TMs normal, no ear wax Mouth:  oropharynx WNL without membranes, exudates, petechiae or ulcers Cardiovascular:   RRR, no MRG. Pulmonary:   Lungs clear bilaterally. Normal respiratory effort.  
Abdomen:   Abdomen soft, NT, ND 
 Extremities:   No edema, LEs warm and well-perfused. Neuro:   Alert and oriented, no focal deficits. No facial asymmetry noted. Skin:    No rash or jaundice MSK:   Normal ROM, 5/5 muscle strength Psych:  No pressured speech or abnormal thought content Lab Results Component Value Date/Time Glucose 84 11/16/2018 03:16 PM  
 Glucose, POC 96 01/09/2015 07:47 PM  
 Creatinine, POC 0.7 01/09/2015 07:47 PM  
 Creatinine 0.55 (L) 11/16/2018 03:16 PM  
   
Assessment/Plan & Orders: ICD-10-CM ICD-9-CM 1. Iron deficiency anemia due to chronic blood loss D50.0 280.0 CBC WITH AUTOMATED DIFF  
   IRON PROFILE FERRITIN  
2. Overweight (BMI 25.0-29. 9) E66.3 278.02 TSH 3RD GENERATION  
   T4, FREE  
   HEMOGLOBIN A1C WITH EAG  
   LIPID PANEL  
   METABOLIC PANEL, COMPREHENSIVE 3. Screening for breast cancer Z12.31 V76.10 [facility] MAMMO BI SCREENING INCL CAD 4. Screening for depression Z13.31 V79.0 [id number] 5. Encounter for immunization Z23 V03.89 diph,Pertuss,Acell,,Tet Vac-PF (ADACEL) 2 Lf-(2.5-5-3-5 mcg)-5Lf/0.5 mL susp HM Colon cancer: colonoscopy due at age 49 Dyslipidemia: will check FLP Diabetes mellitus: will check A1c Influenza vaccine: declines Pneumococcal vaccine: due at age 72 Tdap: up to date Herpes Zoster vaccine: due at age 61 Hep B vaccine: not indicated (liver dz, DM 19-59) Weight:  Body mass index is 26.21 kg/m². Discussed the patient's BMI with her. The BMI follow up plan is as follows: maintain healthy weight Cervical cancer:  pap smear due Breast Cancer: mammogram due Osteoporosis: No indication for DEXA scan Follow-up and Dispositions · Return in about 1 month (around 6/10/2019) for Go over lab/imaging results, Well woman exam, 30 minutes. *Patient verbalized understanding and agreement with the plan. Patient was given an after-visit summary. Pilar Early. Cory Larios MD - Internal Medicine 5/13/2019, 1:22 PM 
 [facility] [address] [facility], [address] Phone [phone] Fax [phone]

## 2019-05-13 NOTE — PROGRESS NOTES
Henry Varela is a 44 y.o.  female presents today for office visit for establish care. Pt would also like to discuss anemia. Pt is not fasting. Pt is in Room # 2 1. Have you been to the ER, urgent care clinic since your last visit? Hospitalized since your last visit? No 
 
2. Have you seen or consulted any other health care providers outside of the [address] [place] [address] since your last visit? Include any pap smears or colon screening. No 
 
Upcoming Appts 
none Health Maintenance reviewed VORB: No orders of the defined types were placed in this encounter.  
Miguel Shin, MCKENZIEN

## 2019-05-14 DIAGNOSIS — E66.3 SEVERELY OVERWEIGHT: Primary | ICD-10-CM

## 2019-05-14 DIAGNOSIS — D50.0 IRON DEFICIENCY ANEMIA DUE TO CHRONIC BLOOD LOSS: ICD-10-CM

## 2019-05-14 NOTE — TELEPHONE ENCOUNTER
VORB:   Orders Placed This Encounter    HEMOGLOBIN A1C WITH EAG    T4, FREE    TSH 3RD GENERATION    LIPID PANEL    CBC WITH AUTOMATED DIFF    IRON PROFILE    FERRITIN    METABOLIC PANEL, COMPREHENSIVE   Joanne Simmons LPN

## 2019-05-15 ENCOUNTER — PATIENT MESSAGE (OUTPATIENT)
Dept: FAMILY MEDICINE CLINIC | Facility: CLINIC | Age: 44
End: 2019-05-15

## 2019-05-15 DIAGNOSIS — D50.0 IRON DEFICIENCY ANEMIA DUE TO CHRONIC BLOOD LOSS: Primary | ICD-10-CM

## 2019-05-15 DIAGNOSIS — N92.4 EXCESSIVE BLEEDING IN PREMENOPAUSAL PERIOD: ICD-10-CM

## 2019-05-15 LAB
ALBUMIN SERPL-MCNC: 4.3 G/DL (ref 3.5–5.5)
ALBUMIN/GLOB SERPL: 1.7 {RATIO} (ref 1.2–2.2)
ALP SERPL-CCNC: 42 IU/L (ref 39–117)
ALT SERPL-CCNC: 31 IU/L (ref 0–32)
AST SERPL-CCNC: 21 IU/L (ref 0–40)
BASOPHILS # BLD AUTO: 0 X10E3/UL (ref 0–0.2)
BASOPHILS NFR BLD AUTO: 1 %
BILIRUB SERPL-MCNC: 0.3 MG/DL (ref 0–1.2)
BUN SERPL-MCNC: 7 MG/DL (ref 6–24)
BUN/CREAT SERPL: 13 (ref 9–23)
CALCIUM SERPL-MCNC: 8.9 MG/DL (ref 8.7–10.2)
CHLORIDE SERPL-SCNC: 108 MMOL/L (ref 96–106)
CHOLEST SERPL-MCNC: 131 MG/DL (ref 100–199)
CO2 SERPL-SCNC: 21 MMOL/L (ref 20–29)
CREAT SERPL-MCNC: 0.54 MG/DL (ref 0.57–1)
EOSINOPHIL # BLD AUTO: 0.4 X10E3/UL (ref 0–0.4)
EOSINOPHIL NFR BLD AUTO: 11 %
ERYTHROCYTE [DISTWIDTH] IN BLOOD BY AUTOMATED COUNT: 20.9 % (ref 12.3–15.4)
EST. AVERAGE GLUCOSE BLD GHB EST-MCNC: 94 MG/DL
FERRITIN SERPL-MCNC: 3 NG/ML (ref 15–150)
GLOBULIN SER CALC-MCNC: 2.6 G/DL (ref 1.5–4.5)
GLUCOSE SERPL-MCNC: 88 MG/DL (ref 65–99)
HBA1C MFR BLD: 4.9 % (ref 4.8–5.6)
HCT VFR BLD AUTO: 26.3 % (ref 34–46.6)
HDLC SERPL-MCNC: 58 MG/DL
HGB BLD-MCNC: 7.2 G/DL (ref 11.1–15.9)
IMM GRANULOCYTES # BLD AUTO: 0 X10E3/UL (ref 0–0.1)
IMM GRANULOCYTES NFR BLD AUTO: 0 %
INTERPRETATION, 910389: NORMAL
IRON SATN MFR SERPL: 2 % (ref 15–55)
IRON SERPL-MCNC: 11 UG/DL (ref 27–159)
LDLC SERPL CALC-MCNC: 66 MG/DL (ref 0–99)
LYMPHOCYTES # BLD AUTO: 0.8 X10E3/UL (ref 0.7–3.1)
LYMPHOCYTES NFR BLD AUTO: 18 %
MCH RBC QN AUTO: 18.2 PG (ref 26.6–33)
MCHC RBC AUTO-ENTMCNC: 27.4 G/DL (ref 31.5–35.7)
MCV RBC AUTO: 66 FL (ref 79–97)
MONOCYTES # BLD AUTO: 0.2 X10E3/UL (ref 0.1–0.9)
MONOCYTES NFR BLD AUTO: 6 %
NEUTROPHILS # BLD AUTO: 2.7 X10E3/UL (ref 1.4–7)
NEUTROPHILS NFR BLD AUTO: 64 %
PLATELET # BLD AUTO: 325 X10E3/UL (ref 150–379)
POTASSIUM SERPL-SCNC: 4.7 MMOL/L (ref 3.5–5.2)
PROT SERPL-MCNC: 6.9 G/DL (ref 6–8.5)
RBC # BLD AUTO: 3.96 X10E6/UL (ref 3.77–5.28)
SODIUM SERPL-SCNC: 140 MMOL/L (ref 134–144)
T4 FREE SERPL-MCNC: 1 NG/DL (ref 0.82–1.77)
TIBC SERPL-MCNC: 477 UG/DL (ref 250–450)
TRIGL SERPL-MCNC: 34 MG/DL (ref 0–149)
TSH SERPL DL<=0.005 MIU/L-ACNC: 2.3 UIU/ML (ref 0.45–4.5)
UIBC SERPL-MCNC: 466 UG/DL (ref 131–425)
VLDLC SERPL CALC-MCNC: 7 MG/DL (ref 5–40)
WBC # BLD AUTO: 4.2 X10E3/UL (ref 3.4–10.8)

## 2019-05-16 DIAGNOSIS — D50.0 IRON DEFICIENCY ANEMIA DUE TO CHRONIC BLOOD LOSS: ICD-10-CM

## 2019-05-16 DIAGNOSIS — E66.3 OVERWEIGHT (BMI 25.0-29.9): ICD-10-CM

## 2019-05-20 DIAGNOSIS — D50.0 IRON DEFICIENCY ANEMIA DUE TO CHRONIC BLOOD LOSS: ICD-10-CM

## 2019-05-21 ENCOUNTER — PATIENT MESSAGE (OUTPATIENT)
Dept: FAMILY MEDICINE CLINIC | Facility: CLINIC | Age: 44
End: 2019-05-21

## 2019-05-22 NOTE — TELEPHONE ENCOUNTER
From: Morro Moran  To: Britt Dow MD  Sent: 5/15/2019 8:07 PM EDT  Subject: Test Results Question    Tengo kendal pregunta sobre el resultado de CVD REPORT el 15/5/19 a la(s) Yoandy Ochoa. I was checking all the results of my labs. I not understand this results. Everything its ok?

## 2019-06-13 ENCOUNTER — OFFICE VISIT (OUTPATIENT)
Dept: FAMILY MEDICINE CLINIC | Facility: CLINIC | Age: 44
End: 2019-06-13

## 2019-06-13 VITALS
RESPIRATION RATE: 15 BRPM | TEMPERATURE: 97.1 F | HEIGHT: 66 IN | WEIGHT: 161 LBS | OXYGEN SATURATION: 98 % | DIASTOLIC BLOOD PRESSURE: 70 MMHG | BODY MASS INDEX: 25.88 KG/M2 | SYSTOLIC BLOOD PRESSURE: 100 MMHG | HEART RATE: 71 BPM

## 2019-06-13 DIAGNOSIS — D50.0 IRON DEFICIENCY ANEMIA DUE TO CHRONIC BLOOD LOSS: ICD-10-CM

## 2019-06-13 DIAGNOSIS — E66.3 OVERWEIGHT (BMI 25.0-29.9): ICD-10-CM

## 2019-06-13 DIAGNOSIS — N92.0 MENORRHAGIA WITH REGULAR CYCLE: Primary | ICD-10-CM

## 2019-06-13 NOTE — PROGRESS NOTES
Internal Medicine Progress Note    Today's Date:  2019   Patient:  Sergio Metzger  Patient :  1975    Subjective:     Chief Complaint   Patient presents with    Results    Anemia    Weight Management      Overweight  This is a chronic problem, new to me. This is not at goal. Pt tries to eat a healthy diet. Pt lost weight since the last visit. Iron deficiency anemia  This is a chronic problem, new to me. This is not controlled. This is due to menorrhagia/irregular menses. Pt can't tolerate iron pills. 3 most recent PHQ Screens 2019   Little interest or pleasure in doing things Not at all   Feeling down, depressed, irritable, or hopeless Nearly every day   Total Score PHQ 2 3   Trouble falling or staying asleep, or sleeping too much More than half the days   Feeling tired or having little energy More than half the days   Poor appetite, weight loss, or overeating Several days   Feeling bad about yourself - or that you are a failure or have let yourself or your family down Not at all   Trouble concentrating on things such as school, work, reading, or watching TV Not at all   Moving or speaking so slowly that other people could have noticed; or the opposite being so fidgety that others notice Not at all   Thoughts of being better off dead, or hurting yourself in some way Not at all   PHQ 9 Score 8      Past Medical History:   Diagnosis Date    Anemia     Anemia     Iron deficiency anemia 2019    Menorrhagia with regular cycle 2019    Overweight (BMI 25.0-29.9) 2019     History reviewed. No pertinent surgical history. reports that she has never smoked. She has never used smokeless tobacco. She reports that she drank alcohol. History reviewed. No pertinent family history.   No Known Allergies    Review of Systems   CV:      chest pain, palpitations  PULM:  SOB, wheezing, cough, sputum production    Current Outpatient Meds and Allergies     Current Outpatient Medications on File Prior to Visit   Medication Sig Dispense Refill    multivitamin (MULTI-DELYN, WELLESSE) liqd Take  by mouth daily. No current facility-administered medications on file prior to visit. No Known Allergies     Objective:       Visit Vitals  /70 (BP 1 Location: Left arm, BP Patient Position: Sitting)   Pulse 71   Temp 97.1 °F (36.2 °C) (Oral)   Resp 15   Ht 5' 6\" (1.676 m)   Wt 161 lb (73 kg)   LMP 06/11/2019   SpO2 98%   BMI 25.99 kg/m²     General:   Well-nourished, well-groomed, pleasant, alert, in no acute distress  Head:  Normocephalic, atraumatic  Ears:  External ears WNL  Nose:  External nares WNL  Psych:  No pressured speech, no abnormal thought content    Lab Results   Component Value Date/Time    Hemoglobin A1c 4.9 05/14/2019 12:00 AM    Glucose 88 05/14/2019 12:00 AM    Glucose, POC 96 01/09/2015 07:47 PM    LDL, calculated 66 05/14/2019 12:00 AM    Creatinine, POC 0.7 01/09/2015 07:47 PM    Creatinine 0.54 (L) 05/14/2019 12:00 AM       Assessment/Plan & Orders:         ICD-10-CM ICD-9-CM    1. Menorrhagia with regular cycle N92.0 626.2    2. Iron deficiency anemia due to chronic blood loss D50.0 280.0    3. Overweight (BMI 25.0-29. 9) E66.3 278.02      Information given on ketogenic/IF diet with exercise  Follow up with gyn and hematology    Follow-up and Dispositions    · Return in about 3 months (around 9/13/2019) for anemia, Weight management. *Patient verbalized understanding and agreement with the plan. Patient was given an after-visit summary. Oskar Rowland.  Todd Garcia MD - Internal Medicine  6/13/2019, 1:29 PM  [facility]  [address] [place], [address]  Phone [phone]  Fax [phone]

## 2019-06-13 NOTE — PROGRESS NOTES
Nona Coppola is a 44 y.o.  female presents today for office visit for follow up. Pt would also like to discuss results. Pt is not fasting. Pt is in Room # 2      1. Have you been to the ER, urgent care clinic since your last visit? Hospitalized since your last visit? No    2. Have you seen or consulted any other health care providers outside of the [address] [place] [address] since your last visit? Include any pap smears or colon screening. No    Upcoming Appts  none    Health Maintenance reviewed       VORB: No orders of the defined types were placed in this encounter.   Miguel Coffey, Luly Lopez LPN

## 2019-07-03 ENCOUNTER — OFFICE VISIT (OUTPATIENT)
Dept: OBGYN CLINIC | Age: 44
End: 2019-07-03

## 2019-07-03 ENCOUNTER — HOSPITAL ENCOUNTER (OUTPATIENT)
Dept: INFUSION THERAPY | Age: 44
Discharge: HOME | End: 2019-07-03
Payer: MEDICAID

## 2019-07-03 ENCOUNTER — HOSPITAL ENCOUNTER (EMERGENCY)
Age: 44
Discharge: HOME | End: 2019-07-03
Attending: EMERGENCY MEDICINE
Payer: MEDICAID

## 2019-07-03 ENCOUNTER — OFFICE VISIT (OUTPATIENT)
Dept: ONCOLOGY | Age: 44
End: 2019-07-03

## 2019-07-03 VITALS
DIASTOLIC BLOOD PRESSURE: 62 MMHG | SYSTOLIC BLOOD PRESSURE: 111 MMHG | OXYGEN SATURATION: 100 % | HEART RATE: 80 BPM | RESPIRATION RATE: 16 BRPM | TEMPERATURE: 98.3 F | HEIGHT: 64 IN | BODY MASS INDEX: 27.66 KG/M2 | WEIGHT: 162 LBS

## 2019-07-03 VITALS
BODY MASS INDEX: 26.15 KG/M2 | OXYGEN SATURATION: 100 % | DIASTOLIC BLOOD PRESSURE: 56 MMHG | RESPIRATION RATE: 16 BRPM | SYSTOLIC BLOOD PRESSURE: 103 MMHG | TEMPERATURE: 97.1 F | WEIGHT: 162 LBS | HEART RATE: 71 BPM

## 2019-07-03 VITALS
HEART RATE: 74 BPM | OXYGEN SATURATION: 99 % | TEMPERATURE: 99 F | RESPIRATION RATE: 18 BRPM | BODY MASS INDEX: 26.03 KG/M2 | HEIGHT: 66 IN | WEIGHT: 162 LBS | SYSTOLIC BLOOD PRESSURE: 117 MMHG | DIASTOLIC BLOOD PRESSURE: 71 MMHG

## 2019-07-03 DIAGNOSIS — D50.9 IRON DEFICIENCY ANEMIA, UNSPECIFIED IRON DEFICIENCY ANEMIA TYPE: Primary | ICD-10-CM

## 2019-07-03 DIAGNOSIS — N92.0 MENORRHAGIA WITH REGULAR CYCLE: ICD-10-CM

## 2019-07-03 DIAGNOSIS — E66.3 OVERWEIGHT (BMI 25.0-29.9): ICD-10-CM

## 2019-07-03 DIAGNOSIS — D50.0 IRON DEFICIENCY ANEMIA DUE TO CHRONIC BLOOD LOSS: ICD-10-CM

## 2019-07-03 DIAGNOSIS — Z01.419 WELL WOMAN EXAM WITH ROUTINE GYNECOLOGICAL EXAM: Primary | ICD-10-CM

## 2019-07-03 DIAGNOSIS — R53.83 FATIGUE, UNSPECIFIED TYPE: ICD-10-CM

## 2019-07-03 DIAGNOSIS — D50.0 IRON DEFICIENCY ANEMIA DUE TO CHRONIC BLOOD LOSS: Primary | ICD-10-CM

## 2019-07-03 LAB
ANION GAP SERPL CALC-SCNC: 7 MMOL/L (ref 3–18)
BASOPHILS # BLD: 0 K/UL (ref 0–0.1)
BASOPHILS # BLD: 0.1 K/UL (ref 0–0.1)
BASOPHILS NFR BLD: 1 % (ref 0–2)
BASOPHILS NFR BLD: 1 % (ref 0–2)
BUN SERPL-MCNC: 9 MG/DL (ref 7–18)
BUN/CREAT SERPL: 16 (ref 12–20)
CALCIUM SERPL-MCNC: 8.4 MG/DL (ref 8.5–10.1)
CHLORIDE SERPL-SCNC: 107 MMOL/L (ref 100–108)
CO2 SERPL-SCNC: 23 MMOL/L (ref 21–32)
CREAT SERPL-MCNC: 0.57 MG/DL (ref 0.6–1.3)
DIFFERENTIAL METHOD BLD: ABNORMAL
DIFFERENTIAL METHOD BLD: ABNORMAL
EOSINOPHIL # BLD: 0.4 K/UL (ref 0–0.4)
EOSINOPHIL # BLD: 0.5 K/UL (ref 0–0.4)
EOSINOPHIL NFR BLD: 7 % (ref 0–5)
EOSINOPHIL NFR BLD: 7 % (ref 0–5)
ERYTHROCYTE [DISTWIDTH] IN BLOOD BY AUTOMATED COUNT: 19.6 % (ref 11.6–14.5)
ERYTHROCYTE [DISTWIDTH] IN BLOOD BY AUTOMATED COUNT: 19.7 % (ref 11.6–14.5)
GLUCOSE SERPL-MCNC: 75 MG/DL (ref 74–99)
HCT VFR BLD AUTO: 23.9 % (ref 35–45)
HCT VFR BLD AUTO: 25.2 % (ref 35–45)
HGB BLD-MCNC: 6.6 G/DL (ref 12–16)
HGB BLD-MCNC: 6.7 G/DL (ref 12–16)
LYMPHOCYTES # BLD: 1.4 K/UL (ref 0.9–3.6)
LYMPHOCYTES # BLD: 1.5 K/UL (ref 0.9–3.6)
LYMPHOCYTES NFR BLD: 21 % (ref 21–52)
LYMPHOCYTES NFR BLD: 23 % (ref 21–52)
MCH RBC QN AUTO: 16.8 PG (ref 24–34)
MCH RBC QN AUTO: 17.2 PG (ref 24–34)
MCHC RBC AUTO-ENTMCNC: 26.6 G/DL (ref 31–37)
MCHC RBC AUTO-ENTMCNC: 27.6 G/DL (ref 31–37)
MCV RBC AUTO: 62.2 FL (ref 74–97)
MCV RBC AUTO: 63.2 FL (ref 74–97)
MONOCYTES # BLD: 0.4 K/UL (ref 0.05–1.2)
MONOCYTES # BLD: 0.5 K/UL (ref 0.05–1.2)
MONOCYTES NFR BLD: 6 % (ref 3–10)
MONOCYTES NFR BLD: 7 % (ref 3–10)
NEUTS SEG # BLD: 3.8 K/UL (ref 1.8–8)
NEUTS SEG # BLD: 4.6 K/UL (ref 1.8–8)
NEUTS SEG NFR BLD: 63 % (ref 40–73)
NEUTS SEG NFR BLD: 64 % (ref 40–73)
PLATELET # BLD AUTO: 274 K/UL (ref 135–420)
PLATELET # BLD AUTO: 297 K/UL (ref 135–420)
POTASSIUM SERPL-SCNC: 4.1 MMOL/L (ref 3.5–5.5)
RBC # BLD AUTO: 3.84 M/UL (ref 4.2–5.3)
RBC # BLD AUTO: 3.99 M/UL (ref 4.2–5.3)
SODIUM SERPL-SCNC: 137 MMOL/L (ref 136–145)
WBC # BLD AUTO: 6 K/UL (ref 4.6–13.2)
WBC # BLD AUTO: 7.2 K/UL (ref 4.6–13.2)

## 2019-07-03 PROCEDURE — 36430 TRANSFUSION BLD/BLD COMPNT: CPT

## 2019-07-03 PROCEDURE — 85025 COMPLETE CBC W/AUTO DIFF WBC: CPT

## 2019-07-03 PROCEDURE — 99284 EMERGENCY DEPT VISIT MOD MDM: CPT

## 2019-07-03 PROCEDURE — 36415 COLL VENOUS BLD VENIPUNCTURE: CPT

## 2019-07-03 PROCEDURE — P9016 RBC LEUKOCYTES REDUCED: HCPCS

## 2019-07-03 PROCEDURE — 86900 BLOOD TYPING SEROLOGIC ABO: CPT

## 2019-07-03 PROCEDURE — 80048 BASIC METABOLIC PNL TOTAL CA: CPT

## 2019-07-03 PROCEDURE — 86923 COMPATIBILITY TEST ELECTRIC: CPT

## 2019-07-03 RX ORDER — TRANEXAMIC ACID 650 1/1
1300 TABLET ORAL 3 TIMES DAILY
Qty: 30 TAB | Refills: 0 | Status: SHIPPED | OUTPATIENT
Start: 2019-07-03 | End: 2019-07-08

## 2019-07-03 RX ORDER — SODIUM CHLORIDE 9 MG/ML
250 INJECTION, SOLUTION INTRAVENOUS AS NEEDED
Status: DISCONTINUED | OUTPATIENT
Start: 2019-07-03 | End: 2019-07-04 | Stop reason: HOSPADM

## 2019-07-03 NOTE — PROGRESS NOTES
Kvng Maciel is a 44 y.o. female presenting today for a new patient appointment r/t anemia. Patient is ambulatory with no assistive devices and denies any complaints. Chief Complaint   Patient presents with    Anemia     new patient     Visit Vitals  /56 (BP 1 Location: Left arm, BP Patient Position: Sitting)   Pulse 71   Temp 97.1 °F (36.2 °C) (Oral)   Resp 16   Wt 73.5 kg (162 lb)   LMP 06/11/2019   SpO2 100%   BMI 26.15 kg/m²         Current Outpatient Medications   Medication Sig    multivitamin (MULTI-DELYN, WELLESSE) liqd Take  by mouth daily. No current facility-administered medications for this visit.         Medications no longer taking/discontinued: none      3 most recent PHQ Screens 5/13/2019   Little interest or pleasure in doing things Not at all   Feeling down, depressed, irritable, or hopeless Nearly every day   Total Score PHQ 2 3   Trouble falling or staying asleep, or sleeping too much More than half the days   Feeling tired or having little energy More than half the days   Poor appetite, weight loss, or overeating Several days   Feeling bad about yourself - or that you are a failure or have let yourself or your family down Not at all   Trouble concentrating on things such as school, work, reading, or watching TV Not at all   Moving or speaking so slowly that other people could have noticed; or the opposite being so fidgety that others notice Not at all   Thoughts of being better off dead, or hurting yourself in some way Not at all   PHQ 9 Score 8       Learning Assessment 7/3/2019   PRIMARY LEARNER Patient   HIGHEST LEVEL OF EDUCATION - PRIMARY LEARNER  4 YEARS OF COLLEGE   PRIMARY LANGUAGE ENGLISH   LEARNER PREFERENCE PRIMARY LISTENING     READING   ANSWERED BY self    RELATIONSHIP SELF

## 2019-07-03 NOTE — PROGRESS NOTES
[facility]  [id number] [place] [address], [address]  [place], [facility]  Office Phone: [phone]  Fax: [phone]      Reason for visit:  No chief complaint on file. HPI:   Nancy Estrada is a 44 y.o.  female who I was asked to see in consultation at the request of Dr. Aissatou Bowling for evaluation for anemia. Patient was seen and examined today. She is awake alert oriented x3. On 5/14/2019, ferritin was 3, TSH normal, and transferrin saturation 2%. WBC 4.2, H&H 7.2/26.3, platelets 035. MCV 66. BUN 7, creatinine 0.54. On review of systems today she reports severe fatigue and pagophagia. Denies any headaches, visual changes, chest pain, shortness of breath, nausea vomiting abdominal pain. No melena or bright red blood per rectum. No epistaxis. No focal neurologic deficit. She reports heavy menstrual bleeding with blood clots. ECOG performance status 0. Independent with ADLs and IADLs. DX:Anemia     LMP=6/11/19      Past Medical History:   Diagnosis Date    Anemia     Anemia     Iron deficiency anemia 5/13/2019    Menorrhagia with regular cycle 6/13/2019    Overweight (BMI 25.0-29.9) 5/13/2019     No past surgical history on file. Social History     Socioeconomic History    Marital status: SINGLE     Spouse name: Not on file    Number of children: Not on file    Years of education: Not on file    Highest education level: Not on file   Tobacco Use    Smoking status: Never Smoker    Smokeless tobacco: Never Used   Substance and Sexual Activity    Alcohol use: Not Currently     No family history on file. Current Outpatient Medications   Medication Sig Dispense Refill    multivitamin (MULTI-DELYN, WELLESSE) liqd Take  by mouth daily. No Known Allergies    Review of Systems  All 12 point of review of system are negative except for easy fatigue, polyphagia and heavy menstrual bleeding.   Objective:  Physical Exam:  Visit Vitals  LMP 06/11/2019     Visit Vitals  /56 (BP 1 Location: Left arm, BP Patient Position: Sitting)   Pulse 71   Temp 97.1 °F (36.2 °C) (Oral)   Resp 16   Wt 73.5 kg (162 lb)   LMP 06/11/2019   SpO2 100%   BMI 26.15 kg/m²       General:  Alert, cooperative, no distress, appears stated age. Head:  Normocephalic, without obvious abnormality, atraumatic. Eyes:  Conjunctivae/corneas clear. PERRL, EOMs intact. Throat: Lips, mucosa, and tongue normal.    Neck: Supple, symmetrical, trachea midline, no adenopathy, thyroid: no enlargement/tenderness/nodules   Back:   Symmetric, no curvature. ROM normal. No CVA tenderness. Lungs:   Clear to auscultation bilaterally. Chest wall:  No tenderness or deformity. Heart:  Regular rate and rhythm, S1, S2 normal, no murmur, click, rub or gallop. Abdomen:   Soft, non-tender. Bowel sounds normal. No masses,  No organomegaly. Extremities: Extremities normal, atraumatic, no cyanosis or edema. Skin: Skin color, texture, turgor normal. No rashes or lesions. Lymph nodes: Cervical, supraclavicular, and axillary nodes normal.   Neurologic: CNII-XII intact. Diagnostic Imaging     Results for orders placed during the hospital encounter of 11/16/18   CT HEAD WO CONT    Narrative EXAM: CT head    INDICATION: Syncope    COMPARISON: None. TECHNIQUE: Axial CT imaging of the head was performed without intravenous  contrast. One or more dose reduction techniques were used on this CT: automated  exposure control, adjustment of the mAs and/or kVp according to patient size,  and iterative reconstruction techniques. The specific techniques used on this  CT exam have been documented in the patient's electronic medical record.    _______________    FINDINGS:    BRAIN AND POSTERIOR FOSSA: The sulci, folia, ventricles and basal cisterns are  within normal limits for the patient's age. There is no intracranial hemorrhage,  mass effect, or midline shift.  There are no areas of abnormal parenchymal  attenuation. EXTRA-AXIAL SPACES AND MENINGES: There are no abnormal extra-axial fluid  collections. CALVARIUM: Intact. SINUSES: There is mucoperiosteal thickening in the ethmoid sinuses, right  sphenoid and right maxillary sinus suggesting chronic paranasal sinus disease. .    OTHER: None.    _______________      Impression IMPRESSION:    No acute intracranial abnormalities. Chronic paranasal sinusitis       Lab Results  Lab Results   Component Value Date/Time    WBC 4.2 05/14/2019 12:00 AM    HGB 7.2 (L) 05/14/2019 12:00 AM    HCT 26.3 (L) 05/14/2019 12:00 AM    PLATELET 688 39/30/8022 12:00 AM    MCV 66 (L) 05/14/2019 12:00 AM       Lab Results   Component Value Date/Time    Sodium 140 05/14/2019 12:00 AM    Potassium 4.7 05/14/2019 12:00 AM    Chloride 108 (H) 05/14/2019 12:00 AM    CO2 21 05/14/2019 12:00 AM    Anion gap 7 11/16/2018 03:16 PM    Glucose 88 05/14/2019 12:00 AM    BUN 7 05/14/2019 12:00 AM    Creatinine 0.54 (L) 05/14/2019 12:00 AM    BUN/Creatinine ratio 13 05/14/2019 12:00 AM    GFR est  05/14/2019 12:00 AM    GFR est non- 05/14/2019 12:00 AM    Calcium 8.9 05/14/2019 12:00 AM    AST (SGOT) 21 05/14/2019 12:00 AM    Alk. phosphatase 42 05/14/2019 12:00 AM    Protein, total 6.9 05/14/2019 12:00 AM    Albumin 4.3 05/14/2019 12:00 AM    A-G Ratio 1.7 05/14/2019 12:00 AM    ALT (SGPT) 31 05/14/2019 12:00 AM       Follow-up with PCP for health maintenance. Assessment/Plan:  44 y.o. female with  1. Iron deficiency anemia due to chronic blood loss  She has been battling with iron deficiency for long time she says. She has severe bug aphasia. *Give IV Injectafer x2  *Follow-up with OB/GYN for menorrhagia which is a source of her iron deficiency most likely. Her CBC came back today with WBC 6.8, H&H 6.8/26.2, platelet 586. I will send patient to ER for transfusion of 1 unit of packed red blood cells. This will help her to feel better.     - CBC WITH AUTOMATED DIFF; Future    2. Menorrhagia with regular cycle  She has been seen by OB/GYN today and ultrasound was ordered to rule out fibroid. *Consider Lysteda during the upcoming mitral cycle and to OB/GYN decide for plan of treatment. 3. Overweight (BMI 25.0-29. 9)  Diet exercise and weight loss program needed. Follow-up with PCP. Return in 2 months with repeat labs.

## 2019-07-03 NOTE — PROGRESS NOTES
Subjective:   44 y.o. female for Well Woman Check. She has concerns for variation in her menstrual cycle for the past several months. Patient's last menstrual period was 06/11/2019. Social History: single partner, contraception - none. Pertinent past medical hstory: no history of HTN, DVT, CAD, DM, liver disease, migraines or smoking. Patient Active Problem List   Diagnosis Code    Iron deficiency anemia D50.9    Overweight (BMI 25.0-29. 9) E66.3    Menorrhagia with regular cycle N92.0     Current Outpatient Medications   Medication Sig Dispense Refill    multivitamin (MULTI-DELYN, WELLESSE) liqd Take  by mouth daily.  tranexamic acid (LYSTEDA) 650 mg tab tablet Take 2 Tabs by mouth three (3) times daily for 5 days. Until cycle cease 30 Tab 0     Facility-Administered Medications Ordered in Other Visits   Medication Dose Route Frequency Provider Last Rate Last Dose    0.9% sodium chloride infusion 250 mL  250 mL IntraVENous PRN Adela Cross PA-C         No Known Allergies  Past Medical History:   Diagnosis Date    Anemia     Anemia     Dizziness     Iron deficiency anemia 5/13/2019    Menorrhagia with regular cycle 6/13/2019    Overweight (BMI 25.0-29.9) 5/13/2019     History reviewed. No pertinent surgical history. Family History   Problem Relation Age of Onset    Hypertension Mother     Stroke Maternal Grandmother      Social History     Tobacco Use    Smoking status: Never Smoker    Smokeless tobacco: Never Used   Substance Use Topics    Alcohol use: Not Currently        ROS:  Feeling well. No dyspnea or chest pain on exertion. No abdominal pain, change in bowel habits, black or bloody stools. No urinary tract symptoms. GYN ROS: normal menses, no abnormal bleeding, pelvic pain or discharge, no breast pain or new or enlarging lumps on self exam. No neurological complaints.     Objective:     Visit Vitals  /71 (BP 1 Location: Left arm)   Pulse 74   Temp 99 °F (37.2 °C) (Oral)   Resp 18   Ht 5' 6\" (1.676 m)   Wt 162 lb (73.5 kg)   LMP 06/11/2019   SpO2 99%   BMI 26.15 kg/m²     The patient appears well, alert, oriented x 3, in no distress. ENT normal.  Neck supple. No adenopathy or thyromegaly. ANGELICA. Lungs are clear, good air entry, no wheezes, rhonchi or rales. S1 and S2 normal, no murmurs, regular rate and rhythm. Abdomen soft without tenderness, guarding, mass or organomegaly. Extremities show no edema, normal peripheral pulses. Neurological is normal, no focal findings. BREAST EXAM: breasts appear normal, no suspicious masses, no skin or nipple changes or axillary nodes    PELVIC EXAM: normal external genitalia, vulva, vagina, cervix, uterus and adnexa    Assessment/Plan:   well woman  pap smear  counseled on breast self exam  return annually or prn    ICD-10-CM ICD-9-CM    1. Well woman exam with routine gynecological exam Z01.419 V72.31 PAP IG, CT-NG TV HPV 16&18,45 ([id number], [id number])   2. Menorrhagia with regular cycle N92.0 626.2 US PELV NON OB W TV   .

## 2019-07-03 NOTE — ED PROVIDER NOTES
EMERGENCY DEPARTMENT HISTORY AND PHYSICAL EXAM    Date: 7/3/2019  Patient Name: Arturo Schwab    History of Presenting Illness     Chief Complaint   Patient presents with    Abnormal Lab Results         History Provided By: Patient    Chief Complaint: anemia  Duration: 1 Days  Timing:  Acute  Location: global  Quality: n/a  Severity: Moderate  Modifying Factors: none  Associated Symptoms: fatigue, weakness, heavy menstrual cycles      HPI: Arturo Schwab is a 44 y.o. female with a PMH of iron deficiency anemia, heavy menstruation who presents to the ER from her hematologist and OB/GYN office for evaluation of anemia requiring a possible blood transfusion. Patient states she has a history of heavy menses and iron deficiency anemia. She is unable to tolerate oral supplements and is scheduled for her first iron transfusion next week. Patient reports increased fatigue and weakness over the last few weeks. Her last menstrual cycle was on June 11. Patient states her hemoglobin today was 6.9 in the office and she was referred to the ER for a blood transfusion. She denied any recent fevers, chills, shortness of breath, chest pain, abdominal pain, nausea, vomiting, dysuria and has no other symptoms or complaints. PCP: Fred Whelan MD    Current Facility-Administered Medications   Medication Dose Route Frequency Provider Last Rate Last Dose    0.9% sodium chloride infusion 250 mL  250 mL IntraVENous PRN Medina Clifton PA-C         Current Outpatient Medications   Medication Sig Dispense Refill    tranexamic acid (LYSTEDA) 650 mg tab tablet Take 2 Tabs by mouth three (3) times daily for 5 days. Until cycle cease 30 Tab 0    multivitamin (MULTI-DELYN, WELLESSE) liqd Take  by mouth daily.          Past History     Past Medical History:  Past Medical History:   Diagnosis Date    Anemia     Anemia     Dizziness     Iron deficiency anemia 5/13/2019    Menorrhagia with regular cycle 6/13/2019    Overweight (BMI 25.0-29.9) 5/13/2019       Past Surgical History:  No past surgical history on file. Family History:  Family History   Problem Relation Age of Onset    Hypertension Mother     Stroke Maternal Grandmother        Social History:  Social History     Tobacco Use    Smoking status: Never Smoker    Smokeless tobacco: Never Used   Substance Use Topics    Alcohol use: Not Currently    Drug use: Never       Allergies:  No Known Allergies      Review of Systems   Review of Systems   Constitutional: Positive for fatigue. Negative for chills and fever. HENT: Negative. Negative for sore throat. Eyes: Negative. Respiratory: Negative for cough and shortness of breath. Cardiovascular: Negative for chest pain and palpitations. Gastrointestinal: Negative for abdominal pain, nausea and vomiting. Genitourinary: Negative for dysuria. Musculoskeletal: Negative. Skin: Negative. Neurological: Positive for weakness. Negative for dizziness, light-headedness and headaches. Psychiatric/Behavioral: Negative. All other systems reviewed and are negative. Physical Exam     Vitals:    07/03/19 1549 07/03/19 1845 07/03/19 1900   BP: 107/68 103/51 103/51   Pulse: 77 73 82   Resp: 16 17 17   Temp: 98.4 °F (36.9 °C)  98.3 °F (36.8 °C)   SpO2: 100% 100% 100%   Weight: 73.5 kg (162 lb)     Height: 5' 4\" (1.626 m)       Physical Exam   Constitutional: She is oriented to person, place, and time. She appears well-developed and well-nourished. No distress. HENT:   Head: Normocephalic and atraumatic. Mouth/Throat: Uvula is midline and oropharynx is clear and moist. Mucous membranes are pale. Eyes: Conjunctivae are normal. No scleral icterus. Neck: Neck supple. No JVD present. No tracheal deviation present. Cardiovascular: Normal rate, regular rhythm and normal heart sounds. Pulmonary/Chest: Effort normal and breath sounds normal. No respiratory distress. She has no wheezes. She has no rales. Abdominal: Soft. There is no tenderness. Musculoskeletal: Normal range of motion. Neurological: She is alert and oriented to person, place, and time. She has normal strength. Gait normal. GCS eye subscore is 4. GCS verbal subscore is 5. GCS motor subscore is 6. Skin: Skin is warm and dry. She is not diaphoretic. Psychiatric: She has a normal mood and affect. Nursing note and vitals reviewed. Diagnostic Study Results     Labs -     Recent Results (from the past 12 hour(s))   CBC WITH AUTOMATED DIFF    Collection Time: 07/03/19  3:14 PM   Result Value Ref Range    WBC 7.2 4.6 - 13.2 K/uL    RBC 3.99 (L) 4.20 - 5.30 M/uL    HGB 6.7 (L) 12.0 - 16.0 g/dL    HCT 25.2 (L) 35.0 - 45.0 %    MCV 63.2 (L) 74.0 - 97.0 FL    MCH 16.8 (L) 24.0 - 34.0 PG    MCHC 26.6 (L) 31.0 - 37.0 g/dL    RDW 19.6 (H) 11.6 - 14.5 %    PLATELET 591 001 - 962 K/uL    NEUTROPHILS 64 40 - 73 %    LYMPHOCYTES 21 21 - 52 %    MONOCYTES 7 3 - 10 %    EOSINOPHILS 7 (H) 0 - 5 %    BASOPHILS 1 0 - 2 %    ABS. NEUTROPHILS 4.6 1.8 - 8.0 K/UL    ABS. LYMPHOCYTES 1.5 0.9 - 3.6 K/UL    ABS. MONOCYTES 0.5 0.05 - 1.2 K/UL    ABS. EOSINOPHILS 0.5 (H) 0.0 - 0.4 K/UL    ABS. BASOPHILS 0.1 0.0 - 0.1 K/UL    DF AUTOMATED     CBC WITH AUTOMATED DIFF    Collection Time: 07/03/19  5:05 PM   Result Value Ref Range    WBC 6.0 4.6 - 13.2 K/uL    RBC 3.84 (L) 4.20 - 5.30 M/uL    HGB 6.6 (L) 12.0 - 16.0 g/dL    HCT 23.9 (L) 35.0 - 45.0 %    MCV 62.2 (L) 74.0 - 97.0 FL    MCH 17.2 (L) 24.0 - 34.0 PG    MCHC 27.6 (L) 31.0 - 37.0 g/dL    RDW 19.7 (H) 11.6 - 14.5 %    PLATELET 294 615 - 524 K/uL    NEUTROPHILS 63 40 - 73 %    LYMPHOCYTES 23 21 - 52 %    MONOCYTES 6 3 - 10 %    EOSINOPHILS 7 (H) 0 - 5 %    BASOPHILS 1 0 - 2 %    ABS. NEUTROPHILS 3.8 1.8 - 8.0 K/UL    ABS. LYMPHOCYTES 1.4 0.9 - 3.6 K/UL    ABS. MONOCYTES 0.4 0.05 - 1.2 K/UL    ABS. EOSINOPHILS 0.4 0.0 - 0.4 K/UL    ABS.  BASOPHILS 0.0 0.0 - 0.1 K/UL    DF AUTOMATED     METABOLIC PANEL, BASIC    Collection Time: 07/03/19  5:05 PM   Result Value Ref Range    Sodium 137 136 - 145 mmol/L    Potassium 4.1 3.5 - 5.5 mmol/L    Chloride 107 100 - 108 mmol/L    CO2 23 21 - 32 mmol/L    Anion gap 7 3.0 - 18 mmol/L    Glucose 75 74 - 99 mg/dL    BUN 9 7.0 - 18 MG/DL    Creatinine 0.57 (L) 0.6 - 1.3 MG/DL    BUN/Creatinine ratio 16 12 - 20      GFR est AA >60 >60 ml/min/1.73m2    GFR est non-AA >60 >60 ml/min/1.73m2    Calcium 8.4 (L) 8.5 - 10.1 MG/DL   TYPE & SCREEN    Collection Time: 07/03/19  5:05 PM   Result Value Ref Range    Crossmatch Expiration 07/06/2019     ABO/Rh(D) A POSITIVE     Antibody screen NEG     CALLED TO: EVETTE ALFREDAGEENA IN ER AT 18:23 ON 07/03/2019 BY [facility]     Unit number [id number]     Blood component type RC LR     Unit division 00     Status of unit ISSUED     Crossmatch result Compatible        Radiologic Studies -   No orders to display     CT Results  (Last 48 hours)    None        CXR Results  (Last 48 hours)    None            Medical Decision Making   I am the first provider for this patient. I reviewed the vital signs, available nursing notes, past medical history, past surgical history, family history and social history. Vital Signs-Reviewed the patient's vital signs. Records Reviewed: Nursing Notes, Old Medical Records and Previous Laboratory Studies     349 PM  41-year-old female who presents the ER from her OB/GYN and hematology office for evaluation of anemia possible need of blood transfusion. Patient reports history of heavy menses and iron deficiency anemia requiring transfusion several months ago. Patient states she has been consulting with hematology and has an upcoming iron transfusion next week. Her last menstrual cycle was June 11. She reports heavy bleeding with each cycle. She is unable to tolerate iron supplements due to nausea and vomiting. Pale mucous membranes on exam.  Recent hemoglobin per patient at office today was 6.9.   We will plan on labs and anticipate single unit transfusion here. Patient with no other complaints on exam.  Henrry YOHANNES Hudson     8:07 PM  Hemoglobin here is 6.6. Patient has been tolerating her blood transfusion without any difficulty. No indication for further imaging or testing at this time. We will plan on discharge at the end of transfusion. Patient is in agreement with plan and will continue follow-up with hematologist and OB/GYN. All questions answered and patient in agreement with plan of care. Will plan for discharge. Henrry YOHANNES Hudson    Disposition:  discharged    DISCHARGE NOTE:       Care plan outlined and precautions discussed. Patient has no new complaints, changes, or physical findings. Results of labs were reviewed with the patient. All medications were reviewed with the patient; will d/c home with n/a. All of pt's questions and concerns were addressed. Patient was instructed and agrees to follow up with pcp, obgyn and hematologist, as well as to return to the ED upon further deterioration. Patient is ready to go home. Follow-up Information     Follow up With Specialties Details Why [facility] EMERGENCY DEPT Emergency Medicine  If symptoms worsen [address] [place] [address]    Tong Díaz MD Internal Medicine Call in 1 day As needed for ER follow up due to transfusion [facility] [phone]            Current Discharge Medication List      CONTINUE these medications which have NOT CHANGED    Details   tranexamic acid (LYSTEDA) 650 mg tab tablet Take 2 Tabs by mouth three (3) times daily for 5 days. Until cycle cease  Qty: 30 Tab, Refills: 0      multivitamin (MULTI-DELYN, WELLESSE) liqd Take  by mouth daily. Provider Notes (Medical Decision Making):     Procedures:  Procedures        Diagnosis     Clinical Impression:   1.  Iron deficiency anemia, unspecified iron deficiency anemia type    2.  Fatigue, unspecified type

## 2019-07-03 NOTE — PROGRESS NOTES
[facility] Progress Note    Date: July 3, 2019    Name: Nancy Pino    MRN: [id number]         : 1975    Peripheral Lab Draw      Ms. Erika Seo to [facility], ambulatory at 1503 accompanied by self. Pt was assessed and education was provided. Ms. Yuli Neff vitals were reviewed and patient was observed for 5 minutes prior to treatment. There were no vitals taken for this visit. Recent Results (from the past 12 hour(s))   CBC WITH AUTOMATED DIFF    Collection Time: 19  3:14 PM   Result Value Ref Range    WBC 7.2 4.6 - 13.2 K/uL    RBC 3.99 (L) 4.20 - 5.30 M/uL    HGB 6.7 (L) 12.0 - 16.0 g/dL    HCT 25.2 (L) 35.0 - 45.0 %    MCV 63.2 (L) 74.0 - 97.0 FL    MCH 16.8 (L) 24.0 - 34.0 PG    MCHC 26.6 (L) 31.0 - 37.0 g/dL    RDW 19.6 (H) 11.6 - 14.5 %    PLATELET 819 872 - 210 K/uL    NEUTROPHILS 64 40 - 73 %    LYMPHOCYTES 21 21 - 52 %    MONOCYTES 7 3 - 10 %    EOSINOPHILS 7 (H) 0 - 5 %    BASOPHILS 1 0 - 2 %    ABS. NEUTROPHILS 4.6 1.8 - 8.0 K/UL    ABS. LYMPHOCYTES 1.5 0.9 - 3.6 K/UL    ABS. MONOCYTES 0.5 0.05 - 1.2 K/UL    ABS. EOSINOPHILS 0.5 (H) 0.0 - 0.4 K/UL    ABS. BASOPHILS 0.1 0.0 - 0.1 K/UL    DF AUTOMATED         Blood obtained peripherally from left arm x 1 attempt with butterfly needle and sent to lab for Cbc w/diff per written orders. No bleeding or hematoma noted at site. Gauze and coban applied. Ms. Erika Seo tolerated the phlebotomy, and had no complaints. Patient armband removed and shredded. Ms. Erika Seo was discharged from [place] [address] in stable condition at 21 .      Garcia Saunders Phlebotomist PCT  July 3, 2019  4:19 PM

## 2019-07-03 NOTE — LETTER
[address] [facility] EMERGENCY DEPT 
[address] [place] [id number] 
[phone] Work/School Note Date: 7/3/2019 To Whom It May concern: 
 
Shyanne Gutierrez was seen and treated today in the emergency room by the following provider(s): 
Attending Provider: Shannon Garvin MD 
Physician Assistant: Ela Parrish PA-C. Shyanne Gutierrez may return to work on 7/6/2019 or sooner if symptoms markedly improve. Sincerely, Fauzia Duque PA-C

## 2019-07-04 LAB
ABO + RH BLD: NORMAL
BLD PROD TYP BPU: NORMAL
BLOOD GROUP ANTIBODIES SERPL: NORMAL
BPU ID: NORMAL
CALLED TO:,BCALL1: NORMAL
CROSSMATCH RESULT,%XM: NORMAL
SPECIMEN EXP DATE BLD: NORMAL
STATUS OF UNIT,%ST: NORMAL
UNIT DIVISION, %UDIV: 0

## 2019-07-04 NOTE — ED NOTES
Patient difficult stick and requires a blood transfusion.  Dr. Mayela Cai at bedside to obtain ultrasound guided IV

## 2019-07-04 NOTE — DISCHARGE INSTRUCTIONS
Patient Education        Iron Deficiency Anemia: Care Instructions  Your Care Instructions    Anemia means that you do not have enough red blood cells. Red blood cells carry oxygen around your body. When you have anemia, it can make you pale, weak, and tired. Many things can cause anemia. The most common cause is loss of blood. This can happen if you have heavy menstrual periods. It can also happen if you have bleeding in your stomach or bowel. You can also get anemia if you don't have enough iron in your diet or if it's hard for your body to absorb iron. In some cases, pregnancy causes anemia. That's because a pregnant woman needs more iron. Your doctor may do more tests to find the cause of your anemia. If a disease or other health problem is causing it, your doctor will treat that problem. It's important to follow up with your doctor to make sure that your iron level returns to normal.  Follow-up care is a key part of your treatment and safety. Be sure to make and go to all appointments, and call your doctor if you are having problems. It's also a good idea to know your test results and keep a list of the medicines you take. How can you care for yourself at home? · If your doctor recommended iron pills, take them as directed. ? Try to take the pills on an empty stomach. You can do this about 1 hour before or 2 hours after meals. But you may need to take iron with food to avoid an upset stomach. ? Do not take antacids or drink milk or anything with caffeine within 2 hours of when you take your iron. They can keep your body from absorbing the iron well. ? Vitamin C helps your body absorb iron. You may want to take iron pills with a glass of orange juice or some other food high in vitamin C.  ? Iron pills may cause stomach problems. These include heartburn, nausea, diarrhea, constipation, and cramps. It can help to drink plenty of fluids and include fruits, vegetables, and fiber in your diet.   ? It's normal for iron pills to make your stool a greenish or grayish black. But internal bleeding can also cause dark stool. So it's important to tell your doctor about any color changes. ? Call your doctor if you think you are having a problem with your iron pills. Even after you start to feel better, it will take several months for your body to build up its supply of iron. ? If you miss a pill, don't take a double dose. ? Keep iron pills out of the reach of small children. Too much iron can be very dangerous. · Eat foods with a lot of iron. These include red meat, shellfish, poultry, and eggs. They also include beans, raisins, whole-grain bread, and leafy green vegetables. · Steam your vegetables. This is the best way to prepare them if you want to get as much iron as possible. · Be safe with medicines. Do not take nonsteroidal anti-inflammatory pain relievers unless your doctor tells you to. These include aspirin, naproxen (Aleve), and ibuprofen (Advil, Motrin). · Liquid iron can stain your teeth. But you can mix it with water or juice and drink it with a straw. Then it won't get on your teeth. When should you call for help? Call 911 anytime you think you may need emergency care. For example, call if:    · You passed out (lost consciousness).    Call your doctor now or seek immediate medical care if:    · You are short of breath.     · You are dizzy or light-headed, or you feel like you may faint.     · You have new or worse bleeding.    Watch closely for changes in your health, and be sure to contact your doctor if:    · You feel weaker or more tired than usual.     · You do not get better as expected. Where can you learn more? Go to http://henny-vincent.info/. Enter M952 in the search box to learn more about \"Iron Deficiency Anemia: Care Instructions. \"  Current as of: May 6, 2018  Content Version: 11.9  © 7577-2495 [employer].  Care instructions adapted under license by [employer] (which disclaims liability or warranty for this information). If you have questions about a medical condition or this instruction, always ask your healthcare professional. [address] any warranty or liability for your use of this information.

## 2019-07-11 ENCOUNTER — HOSPITAL ENCOUNTER (OUTPATIENT)
Dept: INFUSION THERAPY | Age: 44
Discharge: HOME | End: 2019-07-11
Payer: MEDICAID

## 2019-07-11 VITALS
OXYGEN SATURATION: 100 % | HEART RATE: 71 BPM | TEMPERATURE: 97.9 F | RESPIRATION RATE: 18 BRPM | DIASTOLIC BLOOD PRESSURE: 61 MMHG | SYSTOLIC BLOOD PRESSURE: 98 MMHG

## 2019-07-11 DIAGNOSIS — D50.0 IRON DEFICIENCY ANEMIA DUE TO CHRONIC BLOOD LOSS: ICD-10-CM

## 2019-07-11 PROCEDURE — 74011250636 HC RX REV CODE- 250/636: Performed by: INTERNAL MEDICINE

## 2019-07-11 PROCEDURE — 96374 THER/PROPH/DIAG INJ IV PUSH: CPT

## 2019-07-11 PROCEDURE — 96375 TX/PRO/DX INJ NEW DRUG ADDON: CPT

## 2019-07-11 PROCEDURE — 74011250637 HC RX REV CODE- 250/637: Performed by: INTERNAL MEDICINE

## 2019-07-11 RX ORDER — SODIUM CHLORIDE 0.9 % (FLUSH) 0.9 %
5-10 SYRINGE (ML) INJECTION AS NEEDED
Status: DISCONTINUED | OUTPATIENT
Start: 2019-07-11 | End: 2019-07-15 | Stop reason: HOSPADM

## 2019-07-11 RX ORDER — DIPHENHYDRAMINE HCL 25 MG
25 CAPSULE ORAL ONCE
Status: COMPLETED | OUTPATIENT
Start: 2019-07-11 | End: 2019-07-11

## 2019-07-11 RX ORDER — DEXAMETHASONE SODIUM PHOSPHATE 4 MG/ML
4 INJECTION, SOLUTION INTRA-ARTICULAR; INTRALESIONAL; INTRAMUSCULAR; INTRAVENOUS; SOFT TISSUE ONCE
Status: COMPLETED | OUTPATIENT
Start: 2019-07-11 | End: 2019-07-11

## 2019-07-11 RX ADMIN — DEXAMETHASONE SODIUM PHOSPHATE 4 MG: 4 INJECTION, SOLUTION INTRA-ARTICULAR; INTRALESIONAL; INTRAMUSCULAR; INTRAVENOUS; SOFT TISSUE at 11:27

## 2019-07-11 RX ADMIN — Medication 10 ML: at 10:42

## 2019-07-11 RX ADMIN — DIPHENHYDRAMINE HYDROCHLORIDE 25 MG: 25 CAPSULE ORAL at 11:27

## 2019-07-11 RX ADMIN — FERRIC CARBOXYMALTOSE INJECTION 750 MG: 50 INJECTION, SOLUTION INTRAVENOUS at 10:34

## 2019-07-11 NOTE — PROGRESS NOTES
Problem: Pain  Goal: *Control of Pain  7/11/2019 1549 by Patti Shukla RN  Outcome: Progressing Towards Goal  7/11/2019 1052 by Patti Shukla RN  Outcome: Progressing Towards Goal     Problem: Anemia Care Plan (Adult and Pediatric)  Goal: *Labs within defined limits  7/11/2019 1549 by Patti Shukla RN  Outcome: Progressing Towards Goal  7/11/2019 1052 by Patti Shukla RN  Outcome: Progressing Towards Goal  Goal: *Tolerates increased activity  7/11/2019 1549 by Patti Shukla RN  Outcome: Progressing Towards Goal  7/11/2019 1052 by Patti Shukla RN  Outcome: Progressing Towards Goal     Problem: Knowledge Deficit  Goal: *Verbalizes understanding of procedures and medications  7/11/2019 1549 by Patti Shukla RN  Outcome: Progressing Towards Goal  7/11/2019 1052 by Patti Shukla RN  Outcome: Progressing Towards Goal     Problem: Patient Education:  Go to Education Activity  Goal: Patient/Family Education  7/11/2019 1549 by Patti Shukla RN  Outcome: Progressing Towards Goal  7/11/2019 1052 by Patti Shulka RN  Outcome: Progressing Towards Goal

## 2019-07-11 NOTE — PROGRESS NOTES
[address] [facility]    Date: 2019    Name: Ean Sibley    MRN: [id number]         : 1975    Injectafer Infusion    Ms. Rema Spaulding to [place], [facility], at  . Pt was assessed and education was provided. Ms. Jamil Taveras vitals were reviewed and patient was observed for 5 minutes prior to treatment. Visit Vitals  /70 (BP 1 Location: Left arm, BP Patient Position: At rest)   Pulse 76   Temp 97.6 °F (36.4 °C)   Resp 18   SpO2 100%   Breastfeeding? Unknown         22g PIV placed in LAC x 1 attempt. PIV flushed easily and had brisk blood return. Injectafer 750 mg  was initiated @ 2 ml/min. 3 minutes into infusion, VS stable and pt denied complaints of itching, lip/tongue/facial swelling, SOB, CP or other complaints. Ms. Rema Spaulding tolerated the infusion, and had no complaints. VS remained stable. Thirty mins post infusion patient c/o itching and developed two ronel near the [facility]. Dr. Anna Molina was made awarew, and a verbal order received to administer Benadryl 25 mg po and dex 4mg IV push. Medication given patient stop itching and the redden area near her wrist decreased. PIV flushed with NS 10 ml and removed. No bleeding or hematoma noted at site. Guaze and coban applied. Reviewed discharge instructions with patient, including expected side effects (abdominal cramping, nausea, changes in color of urine or feces) and signs of allergic reaction requiring medical attention (itching/hives/rashes, SOB, chest pain, lip/tongue/facial swelling). Patient given printed copy to take home. Patient verbalized understanding of discharge instructions. Patient armband removed and shredded. Ms. Rema Spualding was discharged from [place] [address] in stable condition at 1200.  She is to return on 19@ 1100 am.     Yola Hale RN  2019

## 2019-07-11 NOTE — PROGRESS NOTES
Problem: Pain  Goal: *Control of Pain  Outcome: Progressing Towards Goal     Problem: Anemia Care Plan (Adult and Pediatric)  Goal: *Labs within defined limits  Outcome: Progressing Towards Goal  Goal: *Tolerates increased activity  Outcome: Progressing Towards Goal     Problem: Knowledge Deficit  Goal: *Verbalizes understanding of procedures and medications  Outcome: Progressing Towards Goal     Problem: Patient Education:  Go to Education Activity  Goal: Patient/Family Education  Outcome: Progressing Towards Goal

## 2019-07-12 ENCOUNTER — HOSPITAL ENCOUNTER (OUTPATIENT)
Dept: ULTRASOUND IMAGING | Age: 44
Discharge: HOME | End: 2019-07-12
Attending: OBSTETRICS & GYNECOLOGY
Payer: MEDICAID

## 2019-07-12 ENCOUNTER — HOSPITAL ENCOUNTER (OUTPATIENT)
Dept: MAMMOGRAPHY | Age: 44
Discharge: HOME | End: 2019-07-12
Attending: INTERNAL MEDICINE
Payer: MEDICAID

## 2019-07-12 DIAGNOSIS — Z12.39 SCREENING FOR BREAST CANCER: ICD-10-CM

## 2019-07-12 DIAGNOSIS — N92.0 MENORRHAGIA WITH REGULAR CYCLE: ICD-10-CM

## 2019-07-12 PROCEDURE — 77067 SCR MAMMO BI INCL CAD: CPT

## 2019-07-12 PROCEDURE — 76830 TRANSVAGINAL US NON-OB: CPT

## 2019-07-12 PROCEDURE — 76856 US EXAM PELVIC COMPLETE: CPT

## 2019-07-18 ENCOUNTER — HOSPITAL ENCOUNTER (OUTPATIENT)
Dept: ULTRASOUND IMAGING | Age: 44
Discharge: HOME | End: 2019-07-18
Attending: INTERNAL MEDICINE
Payer: MEDICAID

## 2019-07-18 ENCOUNTER — HOSPITAL ENCOUNTER (OUTPATIENT)
Dept: MAMMOGRAPHY | Age: 44
Discharge: HOME | End: 2019-07-18
Attending: INTERNAL MEDICINE
Payer: MEDICAID

## 2019-07-18 DIAGNOSIS — R92.2 INCONCLUSIVE MAMMOGRAM: ICD-10-CM

## 2019-07-18 DIAGNOSIS — R92.8 ABNORMALITY OF RIGHT BREAST ON SCREENING MAMMOGRAM: ICD-10-CM

## 2019-07-18 PROCEDURE — 77061 BREAST TOMOSYNTHESIS UNI: CPT

## 2019-07-19 ENCOUNTER — HOSPITAL ENCOUNTER (OUTPATIENT)
Dept: INFUSION THERAPY | Age: 44
Discharge: HOME | End: 2019-07-19
Payer: MEDICAID

## 2019-07-19 VITALS
DIASTOLIC BLOOD PRESSURE: 63 MMHG | SYSTOLIC BLOOD PRESSURE: 100 MMHG | TEMPERATURE: 98.7 F | HEART RATE: 69 BPM | OXYGEN SATURATION: 98 % | RESPIRATION RATE: 17 BRPM

## 2019-07-19 DIAGNOSIS — D50.0 IRON DEFICIENCY ANEMIA DUE TO CHRONIC BLOOD LOSS: ICD-10-CM

## 2019-07-19 PROCEDURE — 96365 THER/PROPH/DIAG IV INF INIT: CPT

## 2019-07-19 PROCEDURE — 74011250636 HC RX REV CODE- 250/636: Performed by: INTERNAL MEDICINE

## 2019-07-19 RX ADMIN — FERRIC CARBOXYMALTOSE INJECTION 750 MG: 50 INJECTION, SOLUTION INTRAVENOUS at 12:19

## 2019-07-19 NOTE — PROGRESS NOTES
[address] [facility] Progress Note    Date: 2019    Name: Tomy Bella    MRN: [id number]         : 1975    Injectafer Infusion    Ms. David Lopez to [facility], ambulatory, at 1110 . Pt was assessed and education was provided. Ms. Daphney Omalley vitals were reviewed and patient was observed for 5 minutes prior to treatment. Visit Vitals  /63 (BP 1 Location: Right arm, BP Patient Position: Sitting)   Pulse 69   Temp 98.7 °F (37.1 °C)   Resp 17   SpO2 98%         24g PIV placed in right hand with greater than 3 attempts. Mackenzie Hind PIV flushed easily and had brisk blood return. Injectafer 750 mg  was initiated @ 2 ml/min. PIV flushed with NS 10 ml and removed. No bleeding or hematoma noted at site. Gauze and coban applied. Patient armband removed and shredded. Ms. David Lopez was discharged from [place] [address] in stable condition at 1250. She is to return on 1919@ 0800 for F/U lab appointment.      Roman Ramirez RN  2019  1250

## 2019-07-22 ENCOUNTER — OFFICE VISIT (OUTPATIENT)
Dept: OBGYN CLINIC | Age: 44
End: 2019-07-22

## 2019-07-22 VITALS
BODY MASS INDEX: 27.31 KG/M2 | WEIGHT: 160 LBS | SYSTOLIC BLOOD PRESSURE: 120 MMHG | HEIGHT: 64 IN | DIASTOLIC BLOOD PRESSURE: 62 MMHG | HEART RATE: 82 BPM | RESPIRATION RATE: 18 BRPM

## 2019-07-22 DIAGNOSIS — Z01.419 WELL WOMAN EXAM WITH ROUTINE GYNECOLOGICAL EXAM: Primary | ICD-10-CM

## 2019-07-22 NOTE — PROGRESS NOTES
43 y/o  presents for results. She has menorrhagia and has been treated for anemia with Dr. Patience Gomez with IV iron and Lysteda, which she hasn't taken. She has a long history of fibroids with myomectomy during . She is uncertain about future children and will decide in 1 year. Her cycles remain regular. Visit Vitals  /62   Pulse 82   Resp 18   Ht 5' 4\" (1.626 m)   Wt 160 lb (72.6 kg)   LMP 2019 (Exact Date)   BMI 27.46 kg/m²     Gen: A&Ox3, NAD  Pap smear repeated. Normal appearing cervix and external genitalia. Ultrasound: UTERUS: Anteverted  measuring 10.4 cm in length. 5.7 x 5.5 x 4.9 cm  midline/right fundal fibroid. 2.4 x 1.6 x 2.1 cm left fundal fibroid    A/p:  Symptomatic fibroids. Discussed treatment options. Recommend Lysteda with menstruation and IV Iron. Will await future child birth plans prior to other treatment options if needed. The plan of care has been discussed with the patient. She has been given the opportunity to ask questions, which seem to have been answered satisfactorily.

## 2019-07-25 LAB
C TRACH RRNA CVX QL NAA+PROBE: NEGATIVE
CYTOLOGIST CVX/VAG CYTO: NORMAL
CYTOLOGY CVX/VAG DOC CYTO: NORMAL
CYTOLOGY CVX/VAG DOC THIN PREP: NORMAL
DX ICD CODE: NORMAL
HPV I/H RISK 1 DNA CVX QL PROBE+SIG AMP: NEGATIVE
Lab: NORMAL
Lab: NORMAL
N GONORRHOEA RRNA CVX QL NAA+PROBE: NEGATIVE
OTHER STN SPEC: NORMAL
STAT OF ADQ CVX/VAG CYTO-IMP: NORMAL
T VAGINALIS RRNA SPEC QL NAA+PROBE: NEGATIVE

## 2019-09-03 DIAGNOSIS — D50.0 IRON DEFICIENCY ANEMIA DUE TO CHRONIC BLOOD LOSS: ICD-10-CM

## 2019-11-22 ENCOUNTER — HOSPITAL ENCOUNTER (OUTPATIENT)
Dept: INFUSION THERAPY | Age: 44
Discharge: HOME | End: 2019-11-22
Payer: MEDICAID

## 2019-11-22 VITALS
OXYGEN SATURATION: 98 % | TEMPERATURE: 98 F | SYSTOLIC BLOOD PRESSURE: 110 MMHG | HEART RATE: 69 BPM | DIASTOLIC BLOOD PRESSURE: 70 MMHG

## 2019-11-22 DIAGNOSIS — D50.0 IRON DEFICIENCY ANEMIA DUE TO CHRONIC BLOOD LOSS: ICD-10-CM

## 2019-11-22 LAB
ALBUMIN SERPL-MCNC: 4.1 G/DL (ref 3.4–5)
ALBUMIN/GLOB SERPL: 1.1 {RATIO} (ref 0.8–1.7)
ALP SERPL-CCNC: 52 U/L (ref 45–117)
ALT SERPL-CCNC: 57 U/L (ref 13–56)
ANION GAP SERPL CALC-SCNC: 4 MMOL/L (ref 3–18)
AST SERPL-CCNC: 22 U/L (ref 10–38)
BASO+EOS+MONOS # BLD AUTO: 0.7 K/UL (ref 0–2.3)
BASO+EOS+MONOS NFR BLD AUTO: 18 % (ref 0.1–17)
BILIRUB SERPL-MCNC: 0.5 MG/DL (ref 0.2–1)
BUN SERPL-MCNC: 11 MG/DL (ref 7–18)
BUN/CREAT SERPL: 19 (ref 12–20)
CALCIUM SERPL-MCNC: 8.7 MG/DL (ref 8.5–10.1)
CHLORIDE SERPL-SCNC: 110 MMOL/L (ref 100–111)
CO2 SERPL-SCNC: 24 MMOL/L (ref 21–32)
CREAT SERPL-MCNC: 0.58 MG/DL (ref 0.6–1.3)
DIFFERENTIAL METHOD BLD: ABNORMAL
ERYTHROCYTE [DISTWIDTH] IN BLOOD BY AUTOMATED COUNT: 16.8 % (ref 11.5–14.5)
FERRITIN SERPL-MCNC: 2 NG/ML (ref 8–388)
GLOBULIN SER CALC-MCNC: 3.7 G/DL (ref 2–4)
GLUCOSE SERPL-MCNC: 85 MG/DL (ref 74–99)
HCT VFR BLD AUTO: 28.3 % (ref 36–48)
HGB BLD-MCNC: 8 G/DL (ref 12–16)
IRON SATN MFR SERPL: 3 %
IRON SERPL-MCNC: 15 UG/DL (ref 50–175)
LYMPHOCYTES # BLD: 1.2 K/UL (ref 1.1–5.9)
LYMPHOCYTES NFR BLD: 33 % (ref 14–44)
MCH RBC QN AUTO: 20.4 PG (ref 25–35)
MCHC RBC AUTO-ENTMCNC: 28.3 G/DL (ref 31–37)
MCV RBC AUTO: 72 FL (ref 78–102)
NEUTS SEG # BLD: 1.9 K/UL (ref 1.8–9.5)
NEUTS SEG NFR BLD: 50 % (ref 40–70)
PLATELET # BLD AUTO: 296 K/UL (ref 140–440)
POTASSIUM SERPL-SCNC: 3.9 MMOL/L (ref 3.5–5.5)
PROT SERPL-MCNC: 7.8 G/DL (ref 6.4–8.2)
RBC # BLD AUTO: 3.93 M/UL (ref 4.1–5.1)
SODIUM SERPL-SCNC: 138 MMOL/L (ref 136–145)
TIBC SERPL-MCNC: 541 UG/DL (ref 250–450)
WBC # BLD AUTO: 3.8 K/UL (ref 4.5–13)

## 2019-11-22 PROCEDURE — 85025 COMPLETE CBC W/AUTO DIFF WBC: CPT

## 2019-11-22 PROCEDURE — 36415 COLL VENOUS BLD VENIPUNCTURE: CPT

## 2019-11-22 PROCEDURE — 82728 ASSAY OF FERRITIN: CPT

## 2019-11-22 PROCEDURE — 80053 COMPREHEN METABOLIC PANEL: CPT

## 2019-11-22 PROCEDURE — 83540 ASSAY OF IRON: CPT

## 2019-11-22 NOTE — PROGRESS NOTES
[facility] Progress Note    Date: 2019    Name: Marco Antonio Juares    MRN: [id number]         : 1975    Peripheral Lab Draw      Ms. Janine Ndiaye to [place], ambulatory at 2922 accompanied by self. Pt was assessed and education was provided. Ms. Seble De La Rosa vitals were reviewed and patient was observed for 5 minutes prior to treatment. Visit Vitals  /70 (BP 1 Location: Left arm, BP Patient Position: Sitting)   Pulse 69   Temp 98 °F (36.7 °C)   SpO2 98%     Recent Results (from the past 12 hour(s))   CBC WITH 3 PART DIFF    Collection Time: 19  8:44 AM   Result Value Ref Range    WBC 3.8 (L) 4.5 - 13.0 K/uL    RBC 3.93 (L) 4.10 - 5.10 M/uL    HGB 8.0 (L) 12.0 - 16.0 g/dL    HCT 28.3 (L) 36 - 48 %    MCV 72.0 (L) 78 - 102 FL    MCH 20.4 (L) 25.0 - 35.0 PG    MCHC 28.3 (L) 31 - 37 g/dL    RDW 16.8 (H) 11.5 - 14.5 %    PLATELET 937 731 - 613 K/uL    NEUTROPHILS 50 40 - 70 %    MIXED CELLS 18 (H) 0.1 - 17 %    LYMPHOCYTES 33 14 - 44 %    ABS. NEUTROPHILS 1.9 1.8 - 9.5 K/UL    ABS. MIXED CELLS 0.7 0.0 - 2.3 K/uL    ABS. LYMPHOCYTES 1.2 1.1 - 5.9 K/UL    DF AUTOMATED     METABOLIC PANEL, COMPREHENSIVE    Collection Time: 19  8:44 AM   Result Value Ref Range    Sodium 138 136 - 145 mmol/L    Potassium 3.9 3.5 - 5.5 mmol/L    Chloride 110 100 - 111 mmol/L    CO2 24 21 - 32 mmol/L    Anion gap 4 3.0 - 18 mmol/L    Glucose 85 74 - 99 mg/dL    BUN 11 7.0 - 18 MG/DL    Creatinine 0.58 (L) 0.6 - 1.3 MG/DL    BUN/Creatinine ratio 19 12 - 20      GFR est AA >60 >60 ml/min/1.73m2    GFR est non-AA >60 >60 ml/min/1.73m2    Calcium 8.7 8.5 - 10.1 MG/DL    Bilirubin, total 0.5 0.2 - 1.0 MG/DL    ALT (SGPT) 57 (H) 13 - 56 U/L    AST (SGOT) 22 10 - 38 U/L    Alk.  phosphatase 52 45 - 117 U/L    Protein, total 7.8 6.4 - 8.2 g/dL    Albumin 4.1 3.4 - 5.0 g/dL    Globulin 3.7 2.0 - 4.0 g/dL    A-G Ratio 1.1 0.8 - 1.7         Blood obtained peripherally from left hand x 1 attempt with butterfly needle and sent to lab for Cbc w/diff, Cmp, Iron Profile and Ferritin per written orders. No bleeding or hematoma noted at site. Gauze and coban applied. Ms. Sahil Izquierdo tolerated the phlebotomy, and had no complaints. Patient armband removed and shredded. Ms. Sahil Izquierdo was discharged from [place] [address] in stable condition at 3965.      Niurka Dasilva Phlebotomist PCT  November 22, 2019  9:35 AM

## 2019-11-26 ENCOUNTER — OFFICE VISIT (OUTPATIENT)
Dept: ONCOLOGY | Age: 44
End: 2019-11-26

## 2019-11-26 VITALS
BODY MASS INDEX: 29.19 KG/M2 | SYSTOLIC BLOOD PRESSURE: 94 MMHG | HEIGHT: 64 IN | OXYGEN SATURATION: 100 % | DIASTOLIC BLOOD PRESSURE: 55 MMHG | WEIGHT: 171 LBS | RESPIRATION RATE: 20 BRPM | TEMPERATURE: 97.3 F | HEART RATE: 78 BPM

## 2019-11-26 DIAGNOSIS — D50.0 IRON DEFICIENCY ANEMIA DUE TO CHRONIC BLOOD LOSS: Primary | ICD-10-CM

## 2019-11-26 DIAGNOSIS — E66.3 OVERWEIGHT (BMI 25.0-29.9): ICD-10-CM

## 2019-11-26 DIAGNOSIS — N92.0 MENORRHAGIA WITH REGULAR CYCLE: ICD-10-CM

## 2019-11-26 RX ORDER — METHOCARBAMOL 500 MG/1
TABLET, FILM COATED ORAL
Refills: 0 | COMMUNITY
Start: 2019-10-28 | End: 2020-09-03

## 2019-11-26 NOTE — PROGRESS NOTES
Shan Dow is a 38 y.o. 1975 female with who I was asked to see in consultation at the request of Dr. Lore Chin for evaluation for anemia. I saw her on 7/03/19 in consultation. She was found to have severe iron deficiency anemia and she is status post IV iron infusion with Injectafer x2 completed on 7/19/2019.      On 5/14/2019, ferritin was 3, TSH normal, and transferrin saturation 2%. WBC 4.2, H&H 7.2/26.3, platelets 679. MCV 66. BUN 7, creatinine 0.54. Labs on 11/22/2019 showed WBC 3.8, H&H 8.0/28.3, MCV 72, platelet 779. RDW 16.8, MCHC 28.3. Ferritin 2, transferrin saturation 3%. BUN 11 and creatinine 0.58. In the interim she was seen by OB/GYN Dr. Juan Jeffrey on 7/03/19 and she was waiting to see if lysteda works. Patient is here today to review above labs and to follow-up for further management of her iron deficiency. Past Medical History:   Diagnosis Date    Anemia     Anemia     Dizziness     Iron deficiency anemia 5/13/2019    Menorrhagia with regular cycle 6/13/2019    Overweight (BMI 25.0-29.9) 5/13/2019     No past surgical history on file. Social History     Socioeconomic History    Marital status: SINGLE     Spouse name: Not on file    Number of children: Not on file    Years of education: Not on file    Highest education level: Not on file   Tobacco Use    Smoking status: Never Smoker    Smokeless tobacco: Never Used   Substance and Sexual Activity    Alcohol use: Not Currently    Drug use: Never    Sexual activity: Yes     Family History   Problem Relation Age of Onset    Hypertension Mother     Stroke Maternal Grandmother        Current Outpatient Medications   Medication Sig Dispense Refill    multivitamin (Felicia Mate) liqd Take  by mouth daily. No Known Allergies    Review of Systems  On review of systems today she reports severe fatigue and pagophagia.   Denies any headaches, visual changes, chest pain, shortness of breath, nausea vomiting abdominal pain. No melena or bright red blood per rectum. No epistaxis. No focal neurologic deficit. She reports still having heavy menstrual bleeding with blood clots. ECOG performance status 0. Independent with ADLs and IADLs     Objective:  Visit Vitals  BP 94/55 (BP 1 Location: Left arm, BP Patient Position: Sitting)   Pulse 78   Temp 97.3 °F (36.3 °C) (Oral)   Resp 20   Ht 5' 4\" (1.626 m)   Wt 77.6 kg (171 lb)   LMP 11/25/2019   SpO2 100%   BMI 29.35 kg/m²       Physical Exam:   General appearance - alert, well appearing, and in no distress  Mental status - alert, oriented to person, place, and time  EYE-ANGELICA, EOMI  ENT-ENT exam normal, no neck nodes or sinus tenderness  Mouth - mucous membranes moist, pharynx normal without lesions  Neck - supple, no significant adenopathy   Chest - clear to auscultation, no wheezes, rales or rhonchi, symmetric air entry   Heart - normal rate and regular rhythm   Abdomen - soft, nontender, nondistended, no masses or organomegaly  Lymph- no adenopathy palpable  Ext-no pedal edema noted  Skin-Warm and dry. Neuro -alert, oriented, normal speech, no focal findings or movement disorder noted  Breast - no masses palapated b/l    Diagnostic Imaging     No results found for this or any previous visit. No results found for this or any previous visit. Results for orders placed during the hospital encounter of 11/16/18   CT HEAD WO CONT    Narrative EXAM: CT head    INDICATION: Syncope    COMPARISON: None. TECHNIQUE: Axial CT imaging of the head was performed without intravenous  contrast. One or more dose reduction techniques were used on this CT: automated  exposure control, adjustment of the mAs and/or kVp according to patient size,  and iterative reconstruction techniques.   The specific techniques used on this  CT exam have been documented in the patient's electronic medical record.    _______________    FINDINGS:    BRAIN AND POSTERIOR FOSSA: The sulci, folia, ventricles and basal cisterns are  within normal limits for the patient's age. There is no intracranial hemorrhage,  mass effect, or midline shift. There are no areas of abnormal parenchymal  attenuation. EXTRA-AXIAL SPACES AND MENINGES: There are no abnormal extra-axial fluid  collections. CALVARIUM: Intact. SINUSES: There is mucoperiosteal thickening in the ethmoid sinuses, right  sphenoid and right maxillary sinus suggesting chronic paranasal sinus disease. .    OTHER: None.    _______________      Impression IMPRESSION:    No acute intracranial abnormalities. Chronic paranasal sinusitis       Lab Results  Lab Results   Component Value Date/Time    WBC 3.8 (L) 11/22/2019 08:44 AM    HGB 8.0 (L) 11/22/2019 08:44 AM    HCT 28.3 (L) 11/22/2019 08:44 AM    PLATELET 262 96/32/6399 08:44 AM    MCV 72.0 (L) 11/22/2019 08:44 AM       Lab Results   Component Value Date/Time    Sodium 138 11/22/2019 08:44 AM    Potassium 3.9 11/22/2019 08:44 AM    Chloride 110 11/22/2019 08:44 AM    CO2 24 11/22/2019 08:44 AM    Anion gap 4 11/22/2019 08:44 AM    Glucose 85 11/22/2019 08:44 AM    BUN 11 11/22/2019 08:44 AM    Creatinine 0.58 (L) 11/22/2019 08:44 AM    BUN/Creatinine ratio 19 11/22/2019 08:44 AM    GFR est AA >60 11/22/2019 08:44 AM    GFR est non-AA >60 11/22/2019 08:44 AM    Calcium 8.7 11/22/2019 08:44 AM    AST (SGOT) 22 11/22/2019 08:44 AM    Alk. phosphatase 52 11/22/2019 08:44 AM    Protein, total 7.8 11/22/2019 08:44 AM    Albumin 4.1 11/22/2019 08:44 AM    Globulin 3.7 11/22/2019 08:44 AM    A-G Ratio 1.1 11/22/2019 08:44 AM    ALT (SGPT) 57 (H) 11/22/2019 08:44 AM     Follow-up with PCP for health maintenance. Assessment/Plan:  44 y.o. female with  1. Iron deficiency anemia due to chronic blood loss  Patient is again severely iron deficient with a ferritin of 2 and transferrin saturation of 3%.   Unfortunately unless his heavy menstrual bleeding are controlled she is going to keep happening. *Give IV Injectafer x2  *Unfortunately, she says Camelia Tony is not working. She needs therefore to be seen by OBGYN again to give her birth control pill or surgical procedure. *Follow-up with OB/GYN for menorrhagia which is a source of her iron deficiency most likely.      2. Menorrhagia with regular cycle  *Lysteda not working. F/U with OBGYN     3. Overweight (BMI 25.0-29. 9)  Diet exercise and weight loss program needed.   Follow-up with PCP.     Return in 6 weeks        Anthony Escalante MD

## 2019-11-26 NOTE — PROGRESS NOTES
Jacqueline Pierre is a 38 y.o. female presenting today for a follow-up appointment. Patient is ambulatory with no assistive devices and complaints that the Lysteda medication is not working for her. Chief Complaint   Patient presents with    Anemia     Visit Vitals  BP 94/55 (BP 1 Location: Left arm, BP Patient Position: Sitting)   Pulse 78   Temp 97.3 °F (36.3 °C) (Oral)   Resp 20   Ht 5' 4\" (1.626 m)   Wt 171 lb (77.6 kg)   LMP 11/25/2019   SpO2 100%   BMI 29.35 kg/m²       Current Outpatient Medications   Medication Sig    lidocaine 4 % patch APPLYA NEW PATCH AS DIRECTED TWO TIMES A DAY    methocarbamol (ROBAXIN) 500 mg tablet TAKE 1 TABLET BY MOUTH 4 TIMES A DAY    multivitamin (MULTI-DELYN, WELLESSE) liqd Take  by mouth daily. No current facility-administered medications for this visit. Medications no longer taking/discontinued: Lidocaine patch    3 most recent PHQ Screens 11/26/2019   Little interest or pleasure in doing things Not at all   Feeling down, depressed, irritable, or hopeless Not at all   Total Score PHQ 2 0   Trouble falling or staying asleep, or sleeping too much -   Feeling tired or having little energy -   Poor appetite, weight loss, or overeating -   Feeling bad about yourself - or that you are a failure or have let yourself or your family down -   Trouble concentrating on things such as school, work, reading, or watching TV -   Moving or speaking so slowly that other people could have noticed; or the opposite being so fidgety that others notice -   Thoughts of being better off dead, or hurting yourself in some way -   PHQ 9 Score -       1. Have you been to the ER, urgent care clinic since your last visit? Hospitalized since your last visit? No  2. Have you seen or consulted any other health care providers outside of the [address] [place] [address] since your last visit? Include any pap smears or colon screening.  No

## 2019-12-13 RX ORDER — DIPHENHYDRAMINE HYDROCHLORIDE 50 MG/ML
50 INJECTION, SOLUTION INTRAMUSCULAR; INTRAVENOUS AS NEEDED
Status: CANCELLED
Start: 2019-12-20

## 2019-12-13 RX ORDER — ONDANSETRON 2 MG/ML
8 INJECTION INTRAMUSCULAR; INTRAVENOUS AS NEEDED
Status: CANCELLED | OUTPATIENT
Start: 2019-12-20

## 2019-12-13 RX ORDER — ACETAMINOPHEN 325 MG/1
650 TABLET ORAL AS NEEDED
Status: CANCELLED
Start: 2019-12-20

## 2019-12-13 RX ORDER — HEPARIN 100 UNIT/ML
300-500 SYRINGE INTRAVENOUS AS NEEDED
Status: CANCELLED
Start: 2019-12-20

## 2019-12-19 ENCOUNTER — HOSPITAL ENCOUNTER (OUTPATIENT)
Dept: INFUSION THERAPY | Age: 44
Discharge: HOME | End: 2019-12-19
Payer: MEDICAID

## 2019-12-19 VITALS
HEART RATE: 65 BPM | TEMPERATURE: 97.1 F | DIASTOLIC BLOOD PRESSURE: 61 MMHG | RESPIRATION RATE: 18 BRPM | SYSTOLIC BLOOD PRESSURE: 104 MMHG

## 2019-12-19 DIAGNOSIS — D50.0 IRON DEFICIENCY ANEMIA DUE TO CHRONIC BLOOD LOSS: Primary | ICD-10-CM

## 2019-12-19 PROCEDURE — 96365 THER/PROPH/DIAG IV INF INIT: CPT

## 2019-12-19 PROCEDURE — 74011250636 HC RX REV CODE- 250/636: Performed by: INTERNAL MEDICINE

## 2019-12-19 RX ORDER — SODIUM CHLORIDE 9 MG/ML
25 INJECTION, SOLUTION INTRAVENOUS CONTINUOUS
Status: DISPENSED | OUTPATIENT
Start: 2019-12-19 | End: 2019-12-19

## 2019-12-19 RX ORDER — DIPHENHYDRAMINE HYDROCHLORIDE 50 MG/ML
50 INJECTION, SOLUTION INTRAMUSCULAR; INTRAVENOUS AS NEEDED
Status: ACTIVE | OUTPATIENT
Start: 2019-12-19 | End: 2019-12-19

## 2019-12-19 RX ORDER — SODIUM CHLORIDE 9 MG/ML
10 INJECTION INTRAMUSCULAR; INTRAVENOUS; SUBCUTANEOUS AS NEEDED
Status: CANCELLED | OUTPATIENT
Start: 2019-12-21

## 2019-12-19 RX ORDER — SODIUM CHLORIDE 0.9 % (FLUSH) 0.9 %
10 SYRINGE (ML) INJECTION AS NEEDED
Status: DISPENSED | OUTPATIENT
Start: 2019-12-19 | End: 2019-12-19

## 2019-12-19 RX ORDER — EPINEPHRINE 1 MG/ML
0.3 INJECTION, SOLUTION, CONCENTRATE INTRAVENOUS AS NEEDED
Status: DISPENSED | OUTPATIENT
Start: 2019-12-19 | End: 2019-12-19

## 2019-12-19 RX ORDER — HEPARIN 100 UNIT/ML
300-500 SYRINGE INTRAVENOUS AS NEEDED
Status: CANCELLED
Start: 2019-12-21

## 2019-12-19 RX ORDER — ALBUTEROL SULFATE 0.83 MG/ML
2.5 SOLUTION RESPIRATORY (INHALATION) AS NEEDED
Status: CANCELLED
Start: 2019-12-21

## 2019-12-19 RX ORDER — SODIUM CHLORIDE 9 MG/ML
10 INJECTION INTRAMUSCULAR; INTRAVENOUS; SUBCUTANEOUS AS NEEDED
Status: ACTIVE | OUTPATIENT
Start: 2019-12-19 | End: 2019-12-19

## 2019-12-19 RX ORDER — SODIUM CHLORIDE 0.9 % (FLUSH) 0.9 %
10 SYRINGE (ML) INJECTION AS NEEDED
Status: CANCELLED
Start: 2019-12-21

## 2019-12-19 RX ORDER — EPINEPHRINE 1 MG/ML
0.3 INJECTION, SOLUTION, CONCENTRATE INTRAVENOUS AS NEEDED
Status: CANCELLED | OUTPATIENT
Start: 2019-12-21

## 2019-12-19 RX ORDER — ALBUTEROL SULFATE 90 UG/1
1 AEROSOL, METERED RESPIRATORY (INHALATION)
Status: DISCONTINUED | OUTPATIENT
Start: 2019-12-19 | End: 2019-12-23 | Stop reason: HOSPADM

## 2019-12-19 RX ORDER — HYDROCORTISONE SODIUM SUCCINATE 100 MG/2ML
100 INJECTION, POWDER, FOR SOLUTION INTRAMUSCULAR; INTRAVENOUS AS NEEDED
Status: CANCELLED | OUTPATIENT
Start: 2019-12-21

## 2019-12-19 RX ORDER — ACETAMINOPHEN 325 MG/1
650 TABLET ORAL AS NEEDED
Status: CANCELLED
Start: 2019-12-21

## 2019-12-19 RX ORDER — DIPHENHYDRAMINE HYDROCHLORIDE 50 MG/ML
50 INJECTION, SOLUTION INTRAMUSCULAR; INTRAVENOUS AS NEEDED
Status: CANCELLED
Start: 2019-12-21

## 2019-12-19 RX ORDER — SODIUM CHLORIDE 9 MG/ML
25 INJECTION, SOLUTION INTRAVENOUS CONTINUOUS
Status: CANCELLED | OUTPATIENT
Start: 2019-12-21

## 2019-12-19 RX ORDER — HYDROCORTISONE SODIUM SUCCINATE 100 MG/2ML
100 INJECTION, POWDER, FOR SOLUTION INTRAMUSCULAR; INTRAVENOUS AS NEEDED
Status: ACTIVE | OUTPATIENT
Start: 2019-12-19 | End: 2019-12-19

## 2019-12-19 RX ORDER — ONDANSETRON 2 MG/ML
8 INJECTION INTRAMUSCULAR; INTRAVENOUS AS NEEDED
Status: CANCELLED | OUTPATIENT
Start: 2019-12-21

## 2019-12-19 RX ADMIN — SODIUM CHLORIDE 25 ML/HR: 0.9 INJECTION, SOLUTION INTRAVENOUS at 10:18

## 2019-12-19 RX ADMIN — Medication 10 ML: at 10:17

## 2019-12-19 RX ADMIN — FERRIC CARBOXYMALTOSE INJECTION 750 MG: 50 INJECTION, SOLUTION INTRAVENOUS at 10:18

## 2019-12-19 NOTE — PROGRESS NOTES
[facility] Progress Note    Date: 2019    Name: Kiersten Cardona    MRN: [id number]         : 1975    Injectafer Infusion    Ms. Bijan Boo to [facility], ambulatory, at 0649 . Pt was assessed and education was provided. Ms. Butch Martinez vitals were reviewed and patient was observed for 5 minutes prior to treatment. Patient Vitals for the past 12 hrs:   Temp Pulse Resp BP   19 1115 97.1 °F (36.2 °C) 65 18 104/61   19 1040 96.9 °F (36.1 °C) 70 18 99/57   19 0920 97 °F (36.1 °C) 89 18 117/60       24g PIV placed in Left hand with greater than 3 attempts. [facility] PIV flushed easily and had brisk blood return. Injectafer 750 mg/  ml infused over 20 min. PIV flushed with NS 10 ml and removed. No bleeding or hematoma noted at site. Gauze and coban applied. Patient armband removed and shredded. Ms. Bijan Boo was discharged from [place] [address] in stable condition at 1120. She is to return on 19 at 1300 for next appointment.      Jeffery Cagle RN  2019

## 2019-12-20 DIAGNOSIS — D50.0 IRON DEFICIENCY ANEMIA DUE TO CHRONIC BLOOD LOSS: ICD-10-CM

## 2019-12-21 DIAGNOSIS — D50.0 IRON DEFICIENCY ANEMIA DUE TO CHRONIC BLOOD LOSS: ICD-10-CM

## 2019-12-23 ENCOUNTER — HOSPITAL ENCOUNTER (OUTPATIENT)
Dept: INFUSION THERAPY | Age: 44
End: 2019-12-23
Payer: MEDICAID

## 2020-01-08 ENCOUNTER — APPOINTMENT (OUTPATIENT)
Dept: INFUSION THERAPY | Age: 45
End: 2020-01-08
Payer: MEDICAID

## 2020-01-15 ENCOUNTER — HOSPITAL ENCOUNTER (OUTPATIENT)
Dept: INFUSION THERAPY | Age: 45
End: 2020-01-15
Payer: MEDICAID

## 2020-01-20 ENCOUNTER — APPOINTMENT (OUTPATIENT)
Dept: INFUSION THERAPY | Age: 45
End: 2020-01-20
Attending: INTERNAL MEDICINE
Payer: MEDICAID

## 2020-01-20 ENCOUNTER — TELEPHONE (OUTPATIENT)
Dept: ONCOLOGY | Age: 45
End: 2020-01-20

## 2020-01-20 NOTE — TELEPHONE ENCOUNTER
Left message on voicemail to have patient contact our office to reschedule appointment to next month & have labs 1 week before appointment

## 2020-01-21 ENCOUNTER — HOSPITAL ENCOUNTER (OUTPATIENT)
Dept: INFUSION THERAPY | Age: 45
Discharge: HOME | End: 2020-01-21
Payer: MEDICAID

## 2020-01-21 VITALS
HEART RATE: 69 BPM | DIASTOLIC BLOOD PRESSURE: 63 MMHG | RESPIRATION RATE: 18 BRPM | TEMPERATURE: 96.8 F | SYSTOLIC BLOOD PRESSURE: 95 MMHG

## 2020-01-21 DIAGNOSIS — D50.0 IRON DEFICIENCY ANEMIA DUE TO CHRONIC BLOOD LOSS: Primary | ICD-10-CM

## 2020-01-21 PROCEDURE — 96365 THER/PROPH/DIAG IV INF INIT: CPT

## 2020-01-21 PROCEDURE — 74011250636 HC RX REV CODE- 250/636: Performed by: INTERNAL MEDICINE

## 2020-01-21 RX ORDER — DIPHENHYDRAMINE HYDROCHLORIDE 50 MG/ML
50 INJECTION, SOLUTION INTRAMUSCULAR; INTRAVENOUS AS NEEDED
Status: CANCELLED
Start: 2020-01-23

## 2020-01-21 RX ORDER — ONDANSETRON 2 MG/ML
8 INJECTION INTRAMUSCULAR; INTRAVENOUS AS NEEDED
Status: CANCELLED | OUTPATIENT
Start: 2020-01-23

## 2020-01-21 RX ORDER — SODIUM CHLORIDE 9 MG/ML
25 INJECTION, SOLUTION INTRAVENOUS CONTINUOUS
Status: CANCELLED | OUTPATIENT
Start: 2020-01-23

## 2020-01-21 RX ORDER — ALBUTEROL SULFATE 0.83 MG/ML
2.5 SOLUTION RESPIRATORY (INHALATION) AS NEEDED
Status: CANCELLED
Start: 2020-01-23

## 2020-01-21 RX ORDER — HEPARIN 100 UNIT/ML
300-500 SYRINGE INTRAVENOUS AS NEEDED
Status: CANCELLED
Start: 2020-01-23

## 2020-01-21 RX ORDER — HYDROCORTISONE SODIUM SUCCINATE 100 MG/2ML
100 INJECTION, POWDER, FOR SOLUTION INTRAMUSCULAR; INTRAVENOUS AS NEEDED
Status: CANCELLED | OUTPATIENT
Start: 2020-01-23

## 2020-01-21 RX ORDER — SODIUM CHLORIDE 9 MG/ML
25 INJECTION, SOLUTION INTRAVENOUS CONTINUOUS
Status: DISCONTINUED | OUTPATIENT
Start: 2020-01-21 | End: 2020-01-22 | Stop reason: HOSPADM

## 2020-01-21 RX ORDER — SODIUM CHLORIDE 0.9 % (FLUSH) 0.9 %
10 SYRINGE (ML) INJECTION AS NEEDED
Status: DISCONTINUED | OUTPATIENT
Start: 2020-01-21 | End: 2020-01-22 | Stop reason: HOSPADM

## 2020-01-21 RX ORDER — ACETAMINOPHEN 325 MG/1
650 TABLET ORAL AS NEEDED
Status: CANCELLED
Start: 2020-01-23

## 2020-01-21 RX ORDER — SODIUM CHLORIDE 0.9 % (FLUSH) 0.9 %
10 SYRINGE (ML) INJECTION AS NEEDED
Status: CANCELLED
Start: 2020-01-23

## 2020-01-21 RX ORDER — SODIUM CHLORIDE 9 MG/ML
10 INJECTION INTRAMUSCULAR; INTRAVENOUS; SUBCUTANEOUS AS NEEDED
Status: CANCELLED | OUTPATIENT
Start: 2020-01-23

## 2020-01-21 RX ORDER — EPINEPHRINE 1 MG/ML
0.3 INJECTION, SOLUTION, CONCENTRATE INTRAVENOUS AS NEEDED
Status: CANCELLED | OUTPATIENT
Start: 2020-01-23

## 2020-01-21 RX ADMIN — FERRIC CARBOXYMALTOSE INJECTION 750 MG: 50 INJECTION, SOLUTION INTRAVENOUS at 15:24

## 2020-01-21 RX ADMIN — SODIUM CHLORIDE 25 ML/HR: 9 INJECTION, SOLUTION INTRAVENOUS at 15:24

## 2020-01-21 RX ADMIN — Medication 10 ML: at 15:20

## 2020-01-21 RX ADMIN — Medication 10 ML: at 15:50

## 2020-01-21 NOTE — PROGRESS NOTES
[facility] Progress Note    Date: 2020    Name: Alex Yuen    MRN: [id number]         : 1975    Injectafer Infusion    Ms. Simran Andrea to [facility], ambulatory, at 1510 . Pt was assessed and education was provided. Ms. Carrie Alejandre vitals were reviewed and patient was observed for 5 minutes prior to treatment. Patient Vitals for the past 12 hrs:   Temp Pulse Resp BP   20 1549 96.8 °F (36 °C) 69 18 95/63   20 1510 97 °F (36.1 °C) 74 18 102/53         24g PIV placed in right wrist with 2 attempts. Idaliamarie Patelney PIV flushed easily and had brisk blood return. Injectafer 750 mg/  ml infused over 20 min. Patient verbalized having some nausea with infusion, plan for next time is to slow the rate down. PIV flushed with NS 10 ml and removed. No bleeding or hematoma noted at site. Gauze and coban applied. Patient armband removed and shredded. Ms. Simran Andrea was discharged from [place] [address] in stable condition at [phone]. She is to return on 20 at 1400 for next appointment for labs.      Taran Leon RN  2020

## 2020-01-22 ENCOUNTER — APPOINTMENT (OUTPATIENT)
Dept: INFUSION THERAPY | Age: 45
End: 2020-01-22
Attending: INTERNAL MEDICINE
Payer: MEDICAID

## 2020-02-18 ENCOUNTER — HOSPITAL ENCOUNTER (OUTPATIENT)
Dept: INFUSION THERAPY | Age: 45
Discharge: HOME | End: 2020-02-18
Attending: INTERNAL MEDICINE
Payer: MEDICAID

## 2020-02-18 VITALS — SYSTOLIC BLOOD PRESSURE: 106 MMHG | HEART RATE: 77 BPM | TEMPERATURE: 97 F | DIASTOLIC BLOOD PRESSURE: 66 MMHG

## 2020-02-18 DIAGNOSIS — D50.0 IRON DEFICIENCY ANEMIA DUE TO CHRONIC BLOOD LOSS: ICD-10-CM

## 2020-02-18 DIAGNOSIS — N92.0 MENORRHAGIA WITH REGULAR CYCLE: ICD-10-CM

## 2020-02-18 LAB
APTT PPP: 28.1 SEC (ref 23–36.4)
BASOPHILS # BLD: 0 K/UL (ref 0–0.1)
BASOPHILS NFR BLD: 0 % (ref 0–2)
DIFFERENTIAL METHOD BLD: ABNORMAL
EOSINOPHIL # BLD: 0.4 K/UL (ref 0–0.4)
EOSINOPHIL NFR BLD: 9 % (ref 0–5)
ERYTHROCYTE [DISTWIDTH] IN BLOOD BY AUTOMATED COUNT: 23.3 % (ref 11.6–14.5)
FERRITIN SERPL-MCNC: 23 NG/ML (ref 8–388)
HCT VFR BLD AUTO: 29 % (ref 35–45)
HGB BLD-MCNC: 9.1 G/DL (ref 12–16)
INR PPP: 1 (ref 0.8–1.2)
IRON SATN MFR SERPL: 5 % (ref 20–50)
IRON SERPL-MCNC: 19 UG/DL (ref 50–175)
LYMPHOCYTES # BLD: 1.3 K/UL (ref 0.9–3.6)
LYMPHOCYTES NFR BLD: 28 % (ref 21–52)
MCH RBC QN AUTO: 27.2 PG (ref 24–34)
MCHC RBC AUTO-ENTMCNC: 31.4 G/DL (ref 31–37)
MCV RBC AUTO: 86.6 FL (ref 74–97)
MONOCYTES # BLD: 0.3 K/UL (ref 0.05–1.2)
MONOCYTES NFR BLD: 6 % (ref 3–10)
NEUTS SEG # BLD: 2.6 K/UL (ref 1.8–8)
NEUTS SEG NFR BLD: 57 % (ref 40–73)
PLATELET # BLD AUTO: 211 K/UL (ref 135–420)
PROTHROMBIN TIME: 12.8 SEC (ref 11.5–15.2)
RBC # BLD AUTO: 3.35 M/UL (ref 4.2–5.3)
TIBC SERPL-MCNC: 402 UG/DL (ref 250–450)
WBC # BLD AUTO: 4.5 K/UL (ref 4.6–13.2)

## 2020-02-18 PROCEDURE — 85025 COMPLETE CBC W/AUTO DIFF WBC: CPT

## 2020-02-18 PROCEDURE — 85730 THROMBOPLASTIN TIME PARTIAL: CPT

## 2020-02-18 PROCEDURE — 36415 COLL VENOUS BLD VENIPUNCTURE: CPT

## 2020-02-18 PROCEDURE — 85240 CLOT FACTOR VIII AHG 1 STAGE: CPT

## 2020-02-18 PROCEDURE — 83540 ASSAY OF IRON: CPT

## 2020-02-18 PROCEDURE — 85610 PROTHROMBIN TIME: CPT

## 2020-02-18 PROCEDURE — 82728 ASSAY OF FERRITIN: CPT

## 2020-02-18 NOTE — PROGRESS NOTES
[facility] Progress Note    Date: 2020    Name: Marcus Cardenas    MRN: [id number]         : 1975    Peripheral Lab Draw      Ms. Liliana Celestin to [facility], ambulatory at 1510 accompanied by self. Pt was assessed and education was provided. Ms. Sandrita Arnold vitals were reviewed and patient was observed for 5 minutes prior to treatment. Visit Vitals  /66 (BP 1 Location: Left arm, BP Patient Position: Sitting)   Pulse 77   Temp 97 °F (36.1 °C)       Blood obtained peripherally from left hand x 1 attempt with butterfly needle and sent to lab for Cbc w/diff, Pt-Inr, Ptt, Iron Profile, Ferritin and Von willebrand panel per written orders. No bleeding or hematoma noted at site. Gauze and coban applied. Ms. Liliana Celestin tolerated the phlebotomy, and had no complaints. Patient armband removed and shredded. Ms. Liliana Celestin was discharged from [place] [address] in stable condition at 1520.      Marco Tony Phlebotomist PCT  2020  3:24 PM

## 2020-02-20 LAB
FACT VIII ACT/NOR PPP: 117 % (ref 56–140)
INTERPRETATION, 910378, CSIR1: NORMAL
VWF AG ACT/NOR PPP IA: 124 % (ref 50–200)
VWF:RCO ACT/NOR PPP PL AGG: 112 % (ref 50–200)

## 2020-03-11 RX ORDER — DIPHENHYDRAMINE HYDROCHLORIDE 50 MG/ML
50 INJECTION, SOLUTION INTRAMUSCULAR; INTRAVENOUS AS NEEDED
Status: CANCELLED
Start: 2020-03-13

## 2020-03-11 RX ORDER — HYDROCORTISONE SODIUM SUCCINATE 100 MG/2ML
100 INJECTION, POWDER, FOR SOLUTION INTRAMUSCULAR; INTRAVENOUS AS NEEDED
Status: CANCELLED | OUTPATIENT
Start: 2020-03-13

## 2020-03-11 RX ORDER — HEPARIN 100 UNIT/ML
300-500 SYRINGE INTRAVENOUS AS NEEDED
Status: CANCELLED
Start: 2020-03-13

## 2020-03-11 RX ORDER — DIPHENHYDRAMINE HYDROCHLORIDE 50 MG/ML
25 INJECTION, SOLUTION INTRAMUSCULAR; INTRAVENOUS AS NEEDED
Status: CANCELLED
Start: 2020-03-13

## 2020-03-11 RX ORDER — ACETAMINOPHEN 325 MG/1
650 TABLET ORAL AS NEEDED
Status: CANCELLED
Start: 2020-03-13

## 2020-03-11 RX ORDER — ALBUTEROL SULFATE 0.83 MG/ML
2.5 SOLUTION RESPIRATORY (INHALATION) AS NEEDED
Status: CANCELLED
Start: 2020-03-13

## 2020-03-11 RX ORDER — EPINEPHRINE 1 MG/ML
0.3 INJECTION, SOLUTION, CONCENTRATE INTRAVENOUS AS NEEDED
Status: CANCELLED | OUTPATIENT
Start: 2020-03-13

## 2020-03-11 RX ORDER — SODIUM CHLORIDE 9 MG/ML
10 INJECTION INTRAMUSCULAR; INTRAVENOUS; SUBCUTANEOUS AS NEEDED
Status: CANCELLED | OUTPATIENT
Start: 2020-03-13

## 2020-03-11 RX ORDER — ONDANSETRON 2 MG/ML
8 INJECTION INTRAMUSCULAR; INTRAVENOUS AS NEEDED
Status: CANCELLED | OUTPATIENT
Start: 2020-03-13

## 2020-03-13 ENCOUNTER — HOSPITAL ENCOUNTER (OUTPATIENT)
Dept: INFUSION THERAPY | Age: 45
Discharge: HOME | End: 2020-03-13
Payer: MEDICAID

## 2020-03-13 VITALS
HEART RATE: 66 BPM | SYSTOLIC BLOOD PRESSURE: 105 MMHG | RESPIRATION RATE: 18 BRPM | TEMPERATURE: 97 F | DIASTOLIC BLOOD PRESSURE: 68 MMHG

## 2020-03-13 DIAGNOSIS — D50.0 IRON DEFICIENCY ANEMIA DUE TO CHRONIC BLOOD LOSS: ICD-10-CM

## 2020-03-13 DIAGNOSIS — D50.0 IRON DEFICIENCY ANEMIA DUE TO CHRONIC BLOOD LOSS: Primary | ICD-10-CM

## 2020-03-13 PROCEDURE — 74011250636 HC RX REV CODE- 250/636: Performed by: INTERNAL MEDICINE

## 2020-03-13 PROCEDURE — 96365 THER/PROPH/DIAG IV INF INIT: CPT

## 2020-03-13 RX ORDER — DIPHENHYDRAMINE HYDROCHLORIDE 50 MG/ML
50 INJECTION, SOLUTION INTRAMUSCULAR; INTRAVENOUS AS NEEDED
Status: CANCELLED
Start: 2020-03-20

## 2020-03-13 RX ORDER — ONDANSETRON 2 MG/ML
8 INJECTION INTRAMUSCULAR; INTRAVENOUS AS NEEDED
Status: CANCELLED | OUTPATIENT
Start: 2020-03-20

## 2020-03-13 RX ORDER — SODIUM CHLORIDE 0.9 % (FLUSH) 0.9 %
10 SYRINGE (ML) INJECTION AS NEEDED
Status: DISPENSED | OUTPATIENT
Start: 2020-03-13 | End: 2020-03-13

## 2020-03-13 RX ORDER — SODIUM CHLORIDE 9 MG/ML
25 INJECTION, SOLUTION INTRAVENOUS CONTINUOUS
Status: DISPENSED | OUTPATIENT
Start: 2020-03-13 | End: 2020-03-13

## 2020-03-13 RX ORDER — SODIUM CHLORIDE 9 MG/ML
25 INJECTION, SOLUTION INTRAVENOUS CONTINUOUS
Status: CANCELLED | OUTPATIENT
Start: 2020-03-20

## 2020-03-13 RX ORDER — ACETAMINOPHEN 325 MG/1
650 TABLET ORAL AS NEEDED
Status: CANCELLED
Start: 2020-03-20

## 2020-03-13 RX ORDER — EPINEPHRINE 1 MG/ML
0.3 INJECTION, SOLUTION, CONCENTRATE INTRAVENOUS AS NEEDED
Status: CANCELLED | OUTPATIENT
Start: 2020-03-20

## 2020-03-13 RX ORDER — HYDROCORTISONE SODIUM SUCCINATE 100 MG/2ML
100 INJECTION, POWDER, FOR SOLUTION INTRAMUSCULAR; INTRAVENOUS AS NEEDED
Status: CANCELLED | OUTPATIENT
Start: 2020-03-20

## 2020-03-13 RX ORDER — SODIUM CHLORIDE 9 MG/ML
10 INJECTION INTRAMUSCULAR; INTRAVENOUS; SUBCUTANEOUS AS NEEDED
Status: CANCELLED | OUTPATIENT
Start: 2020-03-20

## 2020-03-13 RX ORDER — HEPARIN 100 UNIT/ML
300-500 SYRINGE INTRAVENOUS AS NEEDED
Status: CANCELLED
Start: 2020-03-20

## 2020-03-13 RX ORDER — ALBUTEROL SULFATE 0.83 MG/ML
2.5 SOLUTION RESPIRATORY (INHALATION) AS NEEDED
Status: CANCELLED
Start: 2020-03-20

## 2020-03-13 RX ORDER — DIPHENHYDRAMINE HYDROCHLORIDE 50 MG/ML
25 INJECTION, SOLUTION INTRAMUSCULAR; INTRAVENOUS AS NEEDED
Status: CANCELLED
Start: 2020-03-20

## 2020-03-13 RX ORDER — SODIUM CHLORIDE 0.9 % (FLUSH) 0.9 %
10 SYRINGE (ML) INJECTION AS NEEDED
Status: CANCELLED
Start: 2020-03-20

## 2020-03-13 RX ADMIN — Medication 10 ML: at 12:38

## 2020-03-13 RX ADMIN — SODIUM CHLORIDE 25 ML/HR: 0.9 INJECTION, SOLUTION INTRAVENOUS at 11:32

## 2020-03-13 RX ADMIN — FERRIC CARBOXYMALTOSE INJECTION 750 MG: 50 INJECTION, SOLUTION INTRAVENOUS at 11:36

## 2020-03-13 NOTE — PROGRESS NOTES
[facility] Progress Note    Date: 2020    Name: Ean Sibley    MRN: [id number]         : 1975    Injectafer Infusion 1 of 2    Ms. Rema Spaulding to [facility], [facility], at 1120 . Pt was assessed and education was provided. Ms. Jamil Taveras vitals were reviewed and patient was observed for 5 minutes prior to treatment. Visit Vitals  /68 (BP 1 Location: Right arm, BP Patient Position: Sitting)   Pulse 66   Temp 97 °F (36.1 °C)   Resp 18       24g PIV placed in right hand with 1 attempt. PIV flushed easily and had brisk blood return. Injectafer 750 mg/  ml infused over 30 min. Patient verbalized having some nausea with infusion, Normal saline rate increased to 200 ml/hr and rate for Injectafer decreased to 550 ml/hr. She was also offered snacks after she revealed that she did not eat prior to appointment today. PIV flushed with NS 10 ml and removed. No bleeding or hematoma noted at site. Gauze and coban applied. Ms. Rema Spaulding verbalized that she felt better after snack, but still c/o epigastric pain, nausea was improved. Patient armband removed and shredded. Ms. Rema Spaulding was discharged from [place] [address] in stable condition at 1240. She is to return on 3/24/20 at 1100 for next appointment for labs.      Merna Gomez RN  2020

## 2020-03-20 DIAGNOSIS — D50.0 IRON DEFICIENCY ANEMIA DUE TO CHRONIC BLOOD LOSS: ICD-10-CM

## 2020-03-24 ENCOUNTER — HOSPITAL ENCOUNTER (OUTPATIENT)
Dept: INFUSION THERAPY | Age: 45
End: 2020-03-24
Payer: MEDICAID

## 2020-05-14 ENCOUNTER — HOSPITAL ENCOUNTER (OUTPATIENT)
Dept: INFUSION THERAPY | Age: 45
Discharge: HOME | End: 2020-05-14
Payer: MEDICAID

## 2020-05-14 VITALS
SYSTOLIC BLOOD PRESSURE: 110 MMHG | DIASTOLIC BLOOD PRESSURE: 70 MMHG | RESPIRATION RATE: 18 BRPM | HEART RATE: 110 BPM | TEMPERATURE: 98.6 F | OXYGEN SATURATION: 100 %

## 2020-05-14 DIAGNOSIS — D50.0 IRON DEFICIENCY ANEMIA DUE TO CHRONIC BLOOD LOSS: Primary | ICD-10-CM

## 2020-05-14 PROCEDURE — 74011250636 HC RX REV CODE- 250/636: Performed by: INTERNAL MEDICINE

## 2020-05-14 PROCEDURE — 96365 THER/PROPH/DIAG IV INF INIT: CPT

## 2020-05-14 RX ORDER — HYDROCORTISONE SODIUM SUCCINATE 100 MG/2ML
100 INJECTION, POWDER, FOR SOLUTION INTRAMUSCULAR; INTRAVENOUS AS NEEDED
Status: CANCELLED | OUTPATIENT
Start: 2020-05-15

## 2020-05-14 RX ORDER — ONDANSETRON 2 MG/ML
8 INJECTION INTRAMUSCULAR; INTRAVENOUS AS NEEDED
Status: CANCELLED | OUTPATIENT
Start: 2020-05-15

## 2020-05-14 RX ORDER — DIPHENHYDRAMINE HYDROCHLORIDE 50 MG/ML
50 INJECTION, SOLUTION INTRAMUSCULAR; INTRAVENOUS AS NEEDED
Status: CANCELLED
Start: 2020-05-15

## 2020-05-14 RX ORDER — ALBUTEROL SULFATE 0.83 MG/ML
2.5 SOLUTION RESPIRATORY (INHALATION) AS NEEDED
Status: CANCELLED
Start: 2020-05-15

## 2020-05-14 RX ORDER — SODIUM CHLORIDE 9 MG/ML
25 INJECTION, SOLUTION INTRAVENOUS CONTINUOUS
Status: DISCONTINUED | OUTPATIENT
Start: 2020-05-14 | End: 2020-05-15 | Stop reason: HOSPADM

## 2020-05-14 RX ORDER — EPINEPHRINE 1 MG/ML
0.3 INJECTION, SOLUTION, CONCENTRATE INTRAVENOUS AS NEEDED
Status: CANCELLED | OUTPATIENT
Start: 2020-05-15

## 2020-05-14 RX ORDER — SODIUM CHLORIDE 9 MG/ML
25 INJECTION, SOLUTION INTRAVENOUS CONTINUOUS
Status: CANCELLED | OUTPATIENT
Start: 2020-05-15

## 2020-05-14 RX ORDER — DIPHENHYDRAMINE HYDROCHLORIDE 50 MG/ML
25 INJECTION, SOLUTION INTRAMUSCULAR; INTRAVENOUS AS NEEDED
Status: CANCELLED
Start: 2020-05-15

## 2020-05-14 RX ORDER — SODIUM CHLORIDE 9 MG/ML
10 INJECTION INTRAMUSCULAR; INTRAVENOUS; SUBCUTANEOUS AS NEEDED
Status: CANCELLED | OUTPATIENT
Start: 2020-05-15

## 2020-05-14 RX ORDER — ACETAMINOPHEN 325 MG/1
650 TABLET ORAL AS NEEDED
Status: CANCELLED
Start: 2020-05-15

## 2020-05-14 RX ORDER — HEPARIN 100 UNIT/ML
300-500 SYRINGE INTRAVENOUS AS NEEDED
Status: CANCELLED
Start: 2020-05-15

## 2020-05-14 RX ORDER — SODIUM CHLORIDE 0.9 % (FLUSH) 0.9 %
10 SYRINGE (ML) INJECTION AS NEEDED
Status: CANCELLED
Start: 2020-05-15

## 2020-05-14 RX ADMIN — FERRIC CARBOXYMALTOSE INJECTION 750 MG: 50 INJECTION, SOLUTION INTRAVENOUS at 14:38

## 2020-05-14 RX ADMIN — SODIUM CHLORIDE 25 ML/HR: 900 INJECTION, SOLUTION INTRAVENOUS at 14:28

## 2020-05-14 NOTE — PROGRESS NOTES
[facility] Progress Note    Date: May 14, 2020    Name: Ean Sibley    MRN: [id number]         : 1975    Injectafer Infusion 1 of 2    Ms. Rema Spaulding to [facility], ambulatory, at 1120 . Pt was assessed and education was provided. Ms. Jamil Taveras vitals were reviewed and patient was observed for 5 minutes prior to treatment. Visit Vitals  /70 (BP 1 Location: Left arm, BP Patient Position: Sitting)   Pulse (!) 110   Temp 98.6 °F (37 °C)   Resp 18   SpO2 100%   Breastfeeding No       24g PIV placed in left hand with 1 attempt. PIV flushed easily and had brisk blood return. Injectafer 750 mg/  ml infused over 30 min. PIV flushed with NS 10 ml and removed. No bleeding or hematoma noted at site. Gauze and coban applied. Patient armband removed and shredded. Ms. Rema Spaulding was discharged from [place] [address] in stable condition at 1520. She has no future appointments with [facility] at this time.     Chrissie Polk  May 14, 2020

## 2020-08-12 DIAGNOSIS — D50.0 IRON DEFICIENCY ANEMIA DUE TO CHRONIC BLOOD LOSS: Primary | ICD-10-CM

## 2020-08-14 ENCOUNTER — OFFICE VISIT (OUTPATIENT)
Dept: OBGYN CLINIC | Age: 45
End: 2020-08-14

## 2020-08-14 VITALS
SYSTOLIC BLOOD PRESSURE: 96 MMHG | BODY MASS INDEX: 29.53 KG/M2 | TEMPERATURE: 96.9 F | HEART RATE: 79 BPM | HEIGHT: 64 IN | WEIGHT: 173 LBS | DIASTOLIC BLOOD PRESSURE: 59 MMHG

## 2020-08-14 DIAGNOSIS — Z12.31 VISIT FOR SCREENING MAMMOGRAM: ICD-10-CM

## 2020-08-14 DIAGNOSIS — N92.0 MENORRHAGIA WITH REGULAR CYCLE: ICD-10-CM

## 2020-08-14 DIAGNOSIS — D21.9 FIBROIDS: Primary | ICD-10-CM

## 2020-08-14 NOTE — PROGRESS NOTES
Subjective: Cristino Smith is a 38 y.o. female who complains of regular and heavy menses. She had been bleeding regularly. She is now bleeding every 28 days and menses are lasting up to 10 days. Pad or tampon count: changes every 2 hours. May experience clots of size up to 3 cm. Dysmenorrhea: moderate, occurring throughout menses. Cyclic symptoms include irritability, moodiness and tension. She denies breast tenderness, bloating and weight gain  History of infertility: no.   Sexual history: not sexually active. Prior Pap smear:was normal.    Patient Active Problem List   Diagnosis Code    Iron deficiency anemia D50.9    Overweight (BMI 25.0-29. 9) [id number]    Menorrhagia with regular cycle N92.0     Current Outpatient Medications   Medication Sig Dispense Refill    lidocaine 4 % patch APPLYA NEW PATCH AS DIRECTED TWO TIMES A DAY  0    methocarbamol (ROBAXIN) 500 mg tablet TAKE 1 TABLET BY MOUTH 4 TIMES A DAY  0    multivitamin (MULTI-DELYN, WELLESSE) liqd Take  by mouth daily. No Known Allergies  Past Medical History:   Diagnosis Date    Anemia     Anemia     Dizziness     Iron deficiency anemia 5/13/2019    Menorrhagia with regular cycle 6/13/2019    Overweight (BMI 25.0-29.9) 5/13/2019     No past surgical history on file. Review of Systems    A comprehensive review of systems was negative except for that written in the HPI. She states her cycles are heavy and painful. She no longer wishes to have more children. She is currently not in a relationship. Objective:     Visit Vitals  BP 96/59   Pulse 79   Temp 96.9 °F (36.1 °C) (Tympanic)   Ht 5' 4\" (1.626 m)   Wt 173 lb (78.5 kg)   BMI 29.70 kg/m²      Physical Exam:   General appearance - alert, well appearing, and in no distress and normal appearing weight  Mental status - alert, oriented to person, place, and time     Assessment/Plan:       ICD-10-CM ICD-9-CM    1. Fibroids  D21.9 215.9 US PELV NON OB W TV   2. Menorrhagia with regular cycle  N92.0 626.2 US PELV NON OB W TV      TSH AND FREE T4   3. Visit for screening mammogram  Z12.31 V76.12 [facility] MAMMO BI SCREENING INCL CAD      Will perform exam once ultrasound and labs are available for review. Has pending labs with Dr. Alisha Bird as well. RTO for results and exam.  The plan of care has been discussed with the patient. She has been given the opportunity to ask questions, which seem to have been answered satisfactorily.

## 2020-08-14 NOTE — PROGRESS NOTES
Chief Complaint   Patient presents with    Fibroids     Visit Vitals  BP 96/59   Pulse 79   Temp 96.9 °F (36.1 °C) (Tympanic)   Ht 5' 4\" (1.626 m)   Wt 173 lb (78.5 kg)   BMI 29.70 kg/m²     Results for orders placed or performed during the hospital encounter of 02/18/20   FERRITIN   Result Value Ref Range    Ferritin 23 8 - 388 NG/ML   IRON PROFILE   Result Value Ref Range    Iron 19 (L) 50 - 175 ug/dL    TIBC 402 250 - 450 ug/dL    Iron % saturation 5 (L) 20 - 50 %   VON WILLEBRAND PANEL   Result Value Ref Range    Factor VIII Activity 117 56 - 140 %    von Willebrand Factor (vWF) Ag 124 50 - 200 %    vWF Activity 112 50 - 200 %    Interpretation Note    PTT   Result Value Ref Range    aPTT 28.1 23.0 - 36.4 SEC   PROTHROMBIN TIME + INR   Result Value Ref Range    Prothrombin time 12.8 11.5 - 15.2 sec    INR 1.0 0.8 - 1.2     CBC WITH AUTOMATED DIFF   Result Value Ref Range    WBC 4.5 (L) 4.6 - 13.2 K/uL    RBC 3.35 (L) 4.20 - 5.30 M/uL    HGB 9.1 (L) 12.0 - 16.0 g/dL    HCT 29.0 (L) 35.0 - 45.0 %    MCV 86.6 74.0 - 97.0 FL    MCH 27.2 24.0 - 34.0 PG    MCHC 31.4 31.0 - 37.0 g/dL    RDW 23.3 (H) 11.6 - 14.5 %    PLATELET 108 125 - 788 K/uL    NEUTROPHILS 57 40 - 73 %    LYMPHOCYTES 28 21 - 52 %    MONOCYTES 6 3 - 10 %    EOSINOPHILS 9 (H) 0 - 5 %    BASOPHILS 0 0 - 2 %    ABS. NEUTROPHILS 2.6 1.8 - 8.0 K/UL    ABS. LYMPHOCYTES 1.3 0.9 - 3.6 K/UL    ABS. MONOCYTES 0.3 0.05 - 1.2 K/UL    ABS. EOSINOPHILS 0.4 0.0 - 0.4 K/UL    ABS. BASOPHILS 0.0 0.0 - 0.1 K/UL    DF AUTOMATED       1. Have you been to the ER, urgent care clinic since your last visit? Hospitalized since your last visit? No    2. Have you seen or consulted any other health care providers outside of the [address] [place] [address] since your last visit? Include any pap smears or colon screening.  No

## 2020-08-19 ENCOUNTER — VIRTUAL VISIT (OUTPATIENT)
Dept: ONCOLOGY | Age: 45
End: 2020-08-19

## 2020-08-19 ENCOUNTER — HOSPITAL ENCOUNTER (OUTPATIENT)
Dept: MAMMOGRAPHY | Age: 45
Discharge: HOME | End: 2020-08-19
Attending: OBSTETRICS & GYNECOLOGY
Payer: MEDICAID

## 2020-08-19 ENCOUNTER — HOSPITAL ENCOUNTER (OUTPATIENT)
Dept: ULTRASOUND IMAGING | Age: 45
Discharge: HOME | End: 2020-08-19
Attending: OBSTETRICS & GYNECOLOGY
Payer: MEDICAID

## 2020-08-19 DIAGNOSIS — Z12.31 VISIT FOR SCREENING MAMMOGRAM: ICD-10-CM

## 2020-08-19 DIAGNOSIS — N92.0 MENORRHAGIA WITH REGULAR CYCLE: ICD-10-CM

## 2020-08-19 DIAGNOSIS — D50.0 IRON DEFICIENCY ANEMIA DUE TO CHRONIC BLOOD LOSS: Primary | ICD-10-CM

## 2020-08-19 DIAGNOSIS — D21.9 FIBROIDS: ICD-10-CM

## 2020-08-19 PROCEDURE — 76830 TRANSVAGINAL US NON-OB: CPT

## 2020-08-19 PROCEDURE — 77063 BREAST TOMOSYNTHESIS BI: CPT

## 2020-08-19 NOTE — PROGRESS NOTES
Susanne Ontiveros is a 38 y.o. female presenting today for a follow-up appointment via Telehealth. Identified patient using two identifiers (full name and ). Patient reports fatigue. NOTE: Patient was not seen by Dr. Gabriela Cook. Advised by MD that patient needs lab work completed and to reschedule her virtual visit after she completes needed labs. Jess PSR called and rescheduled patient/set up labs. Chief Complaint   Patient presents with    Anemia       Current Outpatient Medications   Medication Sig    lidocaine 4 % patch APPLYA NEW PATCH AS DIRECTED TWO TIMES A DAY    methocarbamol (ROBAXIN) 500 mg tablet TAKE 1 TABLET BY MOUTH 4 TIMES A DAY    multivitamin (MULTI-DELYN, WELLESSE) liqd Take  by mouth daily. No current facility-administered medications for this visit. 3 most recent PHQ Screens 2019   Little interest or pleasure in doing things Not at all   Feeling down, depressed, irritable, or hopeless Not at all   Total Score PHQ 2 0   Trouble falling or staying asleep, or sleeping too much -   Feeling tired or having little energy -   Poor appetite, weight loss, or overeating -   Feeling bad about yourself - or that you are a failure or have let yourself or your family down -   Trouble concentrating on things such as school, work, reading, or watching TV -   Moving or speaking so slowly that other people could have noticed; or the opposite being so fidgety that others notice -   Thoughts of being better off dead, or hurting yourself in some way -   PHQ 9 Score -       No flowsheet data found. 1. Have you been to the ER, urgent care clinic since your last visit? Hospitalized since your last visit? Yes [address] [facility] - Blood Transfusion    2. Have you seen or consulted any other health care providers outside of the [address] [place] [address] since your last visit? Include any pap smears or colon screening.  No

## 2020-08-31 ENCOUNTER — HOSPITAL ENCOUNTER (OUTPATIENT)
Dept: LAB | Age: 45
Discharge: HOME | End: 2020-08-31

## 2020-08-31 DIAGNOSIS — D50.0 IRON DEFICIENCY ANEMIA DUE TO CHRONIC BLOOD LOSS: Primary | ICD-10-CM

## 2020-08-31 LAB — XX-LABCORP SPECIMEN COL,LCBCF: NORMAL

## 2020-08-31 PROCEDURE — 99001 SPECIMEN HANDLING PT-LAB: CPT

## 2020-09-01 LAB
ALBUMIN SERPL-MCNC: 4.3 G/DL (ref 3.8–4.8)
ALBUMIN/GLOB SERPL: 2.2 {RATIO} (ref 1.2–2.2)
ALP SERPL-CCNC: 38 IU/L (ref 39–117)
ALT SERPL-CCNC: 16 IU/L (ref 0–32)
AST SERPL-CCNC: 13 IU/L (ref 0–40)
BASOPHILS # BLD AUTO: 0 X10E3/UL (ref 0–0.2)
BASOPHILS NFR BLD AUTO: 1 %
BILIRUB SERPL-MCNC: 0.4 MG/DL (ref 0–1.2)
BUN SERPL-MCNC: 9 MG/DL (ref 6–24)
BUN/CREAT SERPL: 19 (ref 9–23)
CALCIUM SERPL-MCNC: 8.9 MG/DL (ref 8.7–10.2)
CHLORIDE SERPL-SCNC: 104 MMOL/L (ref 96–106)
CO2 SERPL-SCNC: 22 MMOL/L (ref 20–29)
CREAT SERPL-MCNC: 0.48 MG/DL (ref 0.57–1)
EOSINOPHIL # BLD AUTO: 0.2 X10E3/UL (ref 0–0.4)
EOSINOPHIL NFR BLD AUTO: 6 %
ERYTHROCYTE [DISTWIDTH] IN BLOOD BY AUTOMATED COUNT: 21 % (ref 11.7–15.4)
FERRITIN SERPL-MCNC: 3 NG/ML (ref 15–150)
GLOBULIN SER CALC-MCNC: 2 G/DL (ref 1.5–4.5)
GLUCOSE SERPL-MCNC: 86 MG/DL (ref 65–99)
HCT VFR BLD AUTO: 22.7 % (ref 34–46.6)
HGB BLD-MCNC: 6.1 G/DL (ref 11.1–15.9)
IMM GRANULOCYTES # BLD AUTO: 0 X10E3/UL (ref 0–0.1)
IMM GRANULOCYTES NFR BLD AUTO: 0 %
IRON SATN MFR SERPL: 3 % (ref 15–55)
IRON SERPL-MCNC: 12 UG/DL (ref 27–159)
LYMPHOCYTES # BLD AUTO: 0.8 X10E3/UL (ref 0.7–3.1)
LYMPHOCYTES NFR BLD AUTO: 23 %
MCH RBC QN AUTO: 19.1 PG (ref 26.6–33)
MCHC RBC AUTO-ENTMCNC: 26.9 G/DL (ref 31.5–35.7)
MCV RBC AUTO: 71 FL (ref 79–97)
MONOCYTES # BLD AUTO: 0.4 X10E3/UL (ref 0.1–0.9)
MONOCYTES NFR BLD AUTO: 10 %
NEUTROPHILS # BLD AUTO: 2.2 X10E3/UL (ref 1.4–7)
NEUTROPHILS NFR BLD AUTO: 60 %
PLATELET # BLD AUTO: 397 X10E3/UL (ref 150–450)
POTASSIUM SERPL-SCNC: 4.1 MMOL/L (ref 3.5–5.2)
PROT SERPL-MCNC: 6.3 G/DL (ref 6–8.5)
RBC # BLD AUTO: 3.2 X10E6/UL (ref 3.77–5.28)
SODIUM SERPL-SCNC: 140 MMOL/L (ref 134–144)
SPECIMEN STATUS REPORT, ROLRST: NORMAL
TIBC SERPL-MCNC: 458 UG/DL (ref 250–450)
UIBC SERPL-MCNC: 446 UG/DL (ref 131–425)
WBC # BLD AUTO: 3.6 X10E3/UL (ref 3.4–10.8)

## 2020-09-02 ENCOUNTER — HOSPITAL ENCOUNTER (OUTPATIENT)
Dept: INFUSION THERAPY | Age: 45
Discharge: HOME | End: 2020-09-02
Payer: MEDICAID

## 2020-09-02 VITALS
SYSTOLIC BLOOD PRESSURE: 108 MMHG | OXYGEN SATURATION: 100 % | HEART RATE: 108 BPM | DIASTOLIC BLOOD PRESSURE: 68 MMHG | TEMPERATURE: 98 F

## 2020-09-02 PROCEDURE — 86923 COMPATIBILITY TEST ELECTRIC: CPT

## 2020-09-02 PROCEDURE — 86900 BLOOD TYPING SEROLOGIC ABO: CPT

## 2020-09-02 PROCEDURE — 36415 COLL VENOUS BLD VENIPUNCTURE: CPT

## 2020-09-02 RX ORDER — SODIUM CHLORIDE 0.9 % (FLUSH) 0.9 %
10 SYRINGE (ML) INJECTION AS NEEDED
Status: CANCELLED | OUTPATIENT
Start: 2020-09-09

## 2020-09-02 RX ORDER — ALBUTEROL SULFATE 0.83 MG/ML
2.5 SOLUTION RESPIRATORY (INHALATION) AS NEEDED
Status: CANCELLED
Start: 2020-09-09

## 2020-09-02 RX ORDER — SODIUM CHLORIDE 9 MG/ML
10 INJECTION INTRAMUSCULAR; INTRAVENOUS; SUBCUTANEOUS AS NEEDED
Status: CANCELLED | OUTPATIENT
Start: 2020-09-09

## 2020-09-02 RX ORDER — SODIUM CHLORIDE 9 MG/ML
25 INJECTION, SOLUTION INTRAVENOUS CONTINUOUS
Status: CANCELLED | OUTPATIENT
Start: 2020-09-09

## 2020-09-02 RX ORDER — EPINEPHRINE 1 MG/ML
0.3 INJECTION, SOLUTION, CONCENTRATE INTRAVENOUS AS NEEDED
Status: CANCELLED | OUTPATIENT
Start: 2020-09-09

## 2020-09-02 RX ORDER — DIPHENHYDRAMINE HYDROCHLORIDE 50 MG/ML
50 INJECTION, SOLUTION INTRAMUSCULAR; INTRAVENOUS AS NEEDED
Status: CANCELLED
Start: 2020-09-09

## 2020-09-02 RX ORDER — ACETAMINOPHEN 325 MG/1
650 TABLET ORAL AS NEEDED
Status: CANCELLED
Start: 2020-09-09

## 2020-09-02 RX ORDER — HYDROCORTISONE SODIUM SUCCINATE 100 MG/2ML
100 INJECTION, POWDER, FOR SOLUTION INTRAMUSCULAR; INTRAVENOUS AS NEEDED
Status: CANCELLED | OUTPATIENT
Start: 2020-09-09

## 2020-09-02 RX ORDER — DIPHENHYDRAMINE HYDROCHLORIDE 50 MG/ML
25 INJECTION, SOLUTION INTRAMUSCULAR; INTRAVENOUS AS NEEDED
Status: CANCELLED
Start: 2020-09-09

## 2020-09-02 RX ORDER — HEPARIN 100 UNIT/ML
300-500 SYRINGE INTRAVENOUS AS NEEDED
Status: CANCELLED
Start: 2020-09-09

## 2020-09-02 RX ORDER — ONDANSETRON 2 MG/ML
8 INJECTION INTRAMUSCULAR; INTRAVENOUS AS NEEDED
Status: CANCELLED | OUTPATIENT
Start: 2020-09-09

## 2020-09-02 NOTE — PROGRESS NOTES
[facility] Progress Note    Date: 2020    Name: Amanda Thompson    MRN: [id number]         : 1975    Peripheral Lab Draw      Ms. Evi Peter to [facility], ambulatory at [phone] accompanied by self. Pt was assessed and education was provided. Ms. Ashwin Collado vitals were reviewed and patient was observed for 5 minutes prior to treatment. Visit Vitals  /68 (BP 1 Location: Right arm, BP Patient Position: Sitting)   Pulse (!) 108   Temp 98 °F (36.7 °C)   SpO2 100%       Blood obtained peripherally from left arm x 1 attempt with butterfly needle and sent to lab for Type and Crossmatch per written orders. No bleeding or hematoma noted at site. Gauze and coban applied. Ms. Evi Peter tolerated the phlebotomy, and had no complaints. Patient armband removed and shredded. Ms. Evi Peter was discharged from [place] [address] in stable condition at 1155.      Betsey Nayak Phlebotomist PCT  2020  1:09 PM

## 2020-09-03 ENCOUNTER — HOSPITAL ENCOUNTER (OUTPATIENT)
Dept: INFUSION THERAPY | Age: 45
Discharge: HOME | End: 2020-09-03
Payer: MEDICAID

## 2020-09-03 PROCEDURE — 74011250636 HC RX REV CODE- 250/636

## 2020-09-03 PROCEDURE — 74011250636 HC RX REV CODE- 250/636: Performed by: INTERNAL MEDICINE

## 2020-09-03 PROCEDURE — 74011250637 HC RX REV CODE- 250/637: Performed by: INTERNAL MEDICINE

## 2020-09-03 PROCEDURE — 96375 TX/PRO/DX INJ NEW DRUG ADDON: CPT

## 2020-09-03 PROCEDURE — P9016 RBC LEUKOCYTES REDUCED: HCPCS

## 2020-09-03 PROCEDURE — 36430 TRANSFUSION BLD/BLD COMPNT: CPT

## 2020-09-03 PROCEDURE — 77030012965 HC NDL HUBR BBMI -A

## 2020-09-03 PROCEDURE — 96374 THER/PROPH/DIAG INJ IV PUSH: CPT

## 2020-09-03 RX ORDER — DEXAMETHASONE SODIUM PHOSPHATE 4 MG/ML
4 INJECTION, SOLUTION INTRA-ARTICULAR; INTRALESIONAL; INTRAMUSCULAR; INTRAVENOUS; SOFT TISSUE ONCE
Status: COMPLETED | OUTPATIENT
Start: 2020-09-03 | End: 2020-09-03

## 2020-09-03 RX ORDER — ONDANSETRON 2 MG/ML
8 INJECTION INTRAMUSCULAR; INTRAVENOUS ONCE
Status: COMPLETED | OUTPATIENT
Start: 2020-09-03 | End: 2020-09-03

## 2020-09-03 RX ORDER — SODIUM CHLORIDE 9 MG/ML
500 INJECTION, SOLUTION INTRAVENOUS CONTINUOUS
Status: DISCONTINUED | OUTPATIENT
Start: 2020-09-03 | End: 2020-09-04 | Stop reason: HOSPADM

## 2020-09-03 RX ORDER — ONDANSETRON 2 MG/ML
INJECTION INTRAMUSCULAR; INTRAVENOUS
Status: DISCONTINUED
Start: 2020-09-03 | End: 2020-09-03 | Stop reason: SDUPTHER

## 2020-09-03 RX ORDER — SODIUM CHLORIDE 9 MG/ML
250 INJECTION, SOLUTION INTRAVENOUS AS NEEDED
Status: DISCONTINUED | OUTPATIENT
Start: 2020-09-03 | End: 2020-09-05 | Stop reason: HOSPADM

## 2020-09-03 RX ORDER — SODIUM CHLORIDE 0.9 % (FLUSH) 0.9 %
10-40 SYRINGE (ML) INJECTION AS NEEDED
Status: DISCONTINUED | OUTPATIENT
Start: 2020-09-03 | End: 2020-09-05 | Stop reason: HOSPADM

## 2020-09-03 RX ORDER — ACETAMINOPHEN 325 MG/1
650 TABLET ORAL ONCE
Status: COMPLETED | OUTPATIENT
Start: 2020-09-03 | End: 2020-09-03

## 2020-09-03 RX ORDER — DIPHENHYDRAMINE HYDROCHLORIDE 50 MG/ML
INJECTION, SOLUTION INTRAMUSCULAR; INTRAVENOUS
Status: DISCONTINUED
Start: 2020-09-03 | End: 2020-09-03 | Stop reason: SDUPTHER

## 2020-09-03 RX ORDER — DIPHENHYDRAMINE HCL 25 MG
25 CAPSULE ORAL
Status: DISCONTINUED | OUTPATIENT
Start: 2020-09-03 | End: 2020-09-05 | Stop reason: HOSPADM

## 2020-09-03 RX ORDER — DIPHENHYDRAMINE HYDROCHLORIDE 50 MG/ML
50 INJECTION, SOLUTION INTRAMUSCULAR; INTRAVENOUS ONCE
Status: COMPLETED | OUTPATIENT
Start: 2020-09-03 | End: 2020-09-03

## 2020-09-03 RX ADMIN — SODIUM CHLORIDE 250 ML: 9 INJECTION, SOLUTION INTRAVENOUS at 12:00

## 2020-09-03 RX ADMIN — SODIUM CHLORIDE 250 ML: 9 INJECTION, SOLUTION INTRAVENOUS at 08:53

## 2020-09-03 RX ADMIN — MEPERIDINE HYDROCHLORIDE 25 MG: 25 INJECTION INTRAMUSCULAR; INTRAVENOUS; SUBCUTANEOUS at 14:10

## 2020-09-03 RX ADMIN — Medication 10 ML: at 08:35

## 2020-09-03 RX ADMIN — DIPHENHYDRAMINE HYDROCHLORIDE 50 MG: 50 INJECTION, SOLUTION INTRAMUSCULAR; INTRAVENOUS at 14:05

## 2020-09-03 RX ADMIN — ACETAMINOPHEN 650 MG: 325 TABLET, FILM COATED ORAL at 08:48

## 2020-09-03 RX ADMIN — DIPHENHYDRAMINE HYDROCHLORIDE 25 MG: 25 CAPSULE ORAL at 08:48

## 2020-09-03 RX ADMIN — DEXAMETHASONE SODIUM PHOSPHATE 4 MG: 4 INJECTION, SOLUTION INTRAMUSCULAR; INTRAVENOUS at 14:29

## 2020-09-03 RX ADMIN — DIPHENHYDRAMINE HYDROCHLORIDE 50 MG: 50 INJECTION INTRAMUSCULAR; INTRAVENOUS at 14:05

## 2020-09-03 RX ADMIN — ONDANSETRON 8 MG: 2 INJECTION INTRAMUSCULAR; INTRAVENOUS at 14:29

## 2020-09-03 RX ADMIN — ACETAMINOPHEN 650 MG: 325 TABLET, FILM COATED ORAL at 14:05

## 2020-09-03 RX ADMIN — SODIUM CHLORIDE 500 ML: 9 INJECTION, SOLUTION INTRAVENOUS at 13:57

## 2020-09-03 NOTE — PROGRESS NOTES
[facility] Progress Note    Date: September 3, 2020    Name: Niurka Saez    MRN: [id number]         : 1975    PRBCs (2 units)    Ms. Robin Salazar arrived to [facility] at [address] [facility] , ambulatory and in no acute distress. Patient denied complaint of pain or discomfort. Ms. Robin Salazar was assessed and education was provided. Discussed risks and benefits of blood transfusion with patient, including risk of transfusion reaction and disease transmission. Patient verbalized understanding and signed consent placed on chart. Ms. Lalo Valenzuela vitals were reviewed. Visit Vitals  BP (P) 100/57 (BP 1 Location: Left arm, BP Patient Position: At rest;Supine)   Pulse (P) 68   Temp (P) 97.4 °F (36.3 °C)   Resp (P) 16   SpO2 97%       22 g IV inserted in median vein of patient's anterior right forearm  X 1 attempt. Brisk blood return was noted and IV catheter flushed easily with 10 mL NS.     Pre-medications (Tylenol 650 mg and Benadryl 25 mg) were administered as ordered. Normal saline initiated via peripheral IV line at 25 mL/hr. First unit of two ordered units of PRBCs initiated @ 75 ml/hr at 0927. Fifteen minutes into infusion, VS stable and pt denied c/o SOB, itching/hives, lip/tongue/facial swelling, CP or other complaints. Infusion rate increased to 100 mL/hr. Fifteen minutes later, VS stable and pt denied complaints; infusion rate increased to 150 mL/hr for the remainder of the transfusion. Unit finished @ 1127 VS stable and no transfusion reaction suspected. Second unit of two ordered units of PRBCs initiated @ 75 mL/hr at 1205. Fifteen minutes into infusion, VS stable and pt denied c/o SOB, itching/hives, lip/tongue/facial swelling, CP or other complaints. Infusion rate increased to 100 ml/hr. Fifteen minutes later, VS stable and pt denied complaints; infusion rate increased to 150 mL/hr. At 1355, patient began to complain of feeling cold and was notably shivering.  Patient stated that she \"didn't feel good\". Warm blankets provided. Transfusion stopped and peripheral line taken down to IV catheter hub. VS assessed Q 5 minutes with elevation in patient's B/P noted. Emotional support and reassurance rendered. 1356 - New 250 mL bag of NS and tubing hung with saline infusing at 999 mL/hr. Benadryl 50 mg IVP administered with minimal relief of rigors noted. 1405 -  Demerol 25 mg given IVP for persistent rigors. Patient c/o headache and nausea. Tylenol 650 mg p.o given for headache. Patient vomited large amount of whitish colored liquid. Dr. Ewa Manley made aware of transfusion reaction. Zofran 4 mg IVP and Decadron 4 mg IVP administered for transfusion reaction and patient's VS monitored closely. 0 - Dr. Ewa Manley on unit to evaluate patient. Patient's symptoms resolving. NS infusing via peripheral IV line. Transfusion was not resumed and patient monitored in [facility] for additional 60 minutes. 1545 - Patient's VS returned to baseline and patient denied any further complaints. Blood bank made aware of transfusion reaction and form completed and taken to lab. VS stable. 1600- Patient stated that she felt better and a taxi was called to take patient home. Reviewed discharge instructions with patient. Patient instructed to call 911 or report to ED for return of symptoms of reaction, SOB, CP, elevated temp, back pain. Patient verbalized understanding of instructions. Patient encouraged to have someone stay with her for the next 24 hours at home. Patient verbalized understanding. IV site d/cd with catheter removed intact. No irritation, bleeding, or hematoma noted at site. Gauze dressing applied with pressure and coban applied to site. Patient armband removed and shredded    Ms. Kimberly Pool was discharged from [place] [address] in stable condition at 1600. She is to follow up with her physician as needed.        Addendum to initial note of 9/3/2020:  9/4/2020 - Patient was contacted via telephone this morning to check on her status. Per patient she now notes orange tinge to her urine and has persistent abdominal pain/cramping. Patient advised to go to ER as soon as possible for evaluation of symptoms, s/p transfusion reaction. Patient verbalized understanding and agreed to go to ER today.           Betty Vu RN  September 3, 2020

## 2020-09-04 ENCOUNTER — APPOINTMENT (OUTPATIENT)
Dept: CT IMAGING | Age: 45
End: 2020-09-04
Attending: PHYSICIAN ASSISTANT
Payer: MEDICAID

## 2020-09-04 ENCOUNTER — HOSPITAL ENCOUNTER (EMERGENCY)
Age: 45
Discharge: HOME | End: 2020-09-04
Attending: EMERGENCY MEDICINE
Payer: MEDICAID

## 2020-09-04 ENCOUNTER — APPOINTMENT (OUTPATIENT)
Dept: GENERAL RADIOLOGY | Age: 45
End: 2020-09-04
Attending: PHYSICIAN ASSISTANT
Payer: MEDICAID

## 2020-09-04 VITALS
SYSTOLIC BLOOD PRESSURE: 104 MMHG | RESPIRATION RATE: 16 BRPM | HEART RATE: 92 BPM | TEMPERATURE: 96.6 F | OXYGEN SATURATION: 98 % | DIASTOLIC BLOOD PRESSURE: 56 MMHG

## 2020-09-04 VITALS
DIASTOLIC BLOOD PRESSURE: 64 MMHG | TEMPERATURE: 98.6 F | SYSTOLIC BLOOD PRESSURE: 117 MMHG | RESPIRATION RATE: 18 BRPM | OXYGEN SATURATION: 100 % | WEIGHT: 155 LBS | HEIGHT: 66 IN | HEART RATE: 70 BPM | BODY MASS INDEX: 24.91 KG/M2

## 2020-09-04 DIAGNOSIS — D72.829 LEUKOCYTOSIS, UNSPECIFIED TYPE: Primary | ICD-10-CM

## 2020-09-04 DIAGNOSIS — E87.1 HYPONATREMIA: ICD-10-CM

## 2020-09-04 DIAGNOSIS — R74.8 ELEVATED LIVER ENZYMES: ICD-10-CM

## 2020-09-04 DIAGNOSIS — R10.13 ABDOMINAL PAIN, EPIGASTRIC: ICD-10-CM

## 2020-09-04 LAB
ABO + RH BLD: NORMAL
ALBUMIN SERPL-MCNC: 4.1 G/DL (ref 3.4–5)
ALBUMIN/GLOB SERPL: 1.2 {RATIO} (ref 0.8–1.7)
ALP SERPL-CCNC: 64 U/L (ref 45–117)
ALT SERPL-CCNC: 223 U/L (ref 13–56)
ANION GAP SERPL CALC-SCNC: 4 MMOL/L (ref 3–18)
APPEARANCE UR: NORMAL
AST SERPL-CCNC: 145 U/L (ref 10–38)
BASOPHILS # BLD: 0 K/UL (ref 0–0.1)
BASOPHILS NFR BLD: 0 % (ref 0–2)
BILIRUB SERPL-MCNC: 3.1 MG/DL (ref 0.2–1)
BILIRUB UR QL: NEGATIVE
BLD PROD TYP BPU: NORMAL
BLD PROD TYP BPU: NORMAL
BLOOD GROUP ANTIBODIES SERPL: NORMAL
BPU ID: NORMAL
BPU ID: NORMAL
BUN SERPL-MCNC: 9 MG/DL (ref 7–18)
BUN/CREAT SERPL: 12 (ref 12–20)
CALCIUM SERPL-MCNC: 8.9 MG/DL (ref 8.5–10.1)
CHLORIDE SERPL-SCNC: 108 MMOL/L (ref 100–111)
CK SERPL-CCNC: 32 U/L (ref 26–192)
CO2 SERPL-SCNC: 23 MMOL/L (ref 21–32)
COLOR UR: NORMAL
CREAT SERPL-MCNC: 0.73 MG/DL (ref 0.6–1.3)
CROSSMATCH RESULT,%XM: NORMAL
CROSSMATCH RESULT,%XM: NORMAL
DIFFERENTIAL METHOD BLD: ABNORMAL
EOSINOPHIL # BLD: 0.2 K/UL (ref 0–0.4)
EOSINOPHIL NFR BLD: 1 % (ref 0–5)
ERYTHROCYTE [DISTWIDTH] IN BLOOD BY AUTOMATED COUNT: 22.1 % (ref 11.6–14.5)
GLOBULIN SER CALC-MCNC: 3.5 G/DL (ref 2–4)
GLUCOSE SERPL-MCNC: 116 MG/DL (ref 74–99)
GLUCOSE UR STRIP.AUTO-MCNC: NEGATIVE MG/DL
HCG UR QL: NEGATIVE
HCT VFR BLD AUTO: 29 % (ref 35–45)
HGB BLD-MCNC: 8.4 G/DL (ref 12–16)
HGB UR QL STRIP: NEGATIVE
KETONES UR QL STRIP.AUTO: NEGATIVE MG/DL
LACTATE BLD-SCNC: 1.05 MMOL/L (ref 0.4–2)
LEUKOCYTE ESTERASE UR QL STRIP.AUTO: NEGATIVE
LIPASE SERPL-CCNC: 83 U/L (ref 73–393)
LYMPHOCYTES # BLD: 0.8 K/UL (ref 0.9–3.6)
LYMPHOCYTES NFR BLD: 5 % (ref 21–52)
MCH RBC QN AUTO: 20.6 PG (ref 24–34)
MCHC RBC AUTO-ENTMCNC: 29 G/DL (ref 31–37)
MCV RBC AUTO: 71.1 FL (ref 74–97)
MONOCYTES # BLD: 0.5 K/UL (ref 0.05–1.2)
MONOCYTES NFR BLD: 3 % (ref 3–10)
NEUTS SEG # BLD: 16.7 K/UL (ref 1.8–8)
NEUTS SEG NFR BLD: 91 % (ref 40–73)
NITRITE UR QL STRIP.AUTO: NEGATIVE
PH UR STRIP: 5.5 [PH] (ref 5–8)
PLATELET # BLD AUTO: 234 K/UL (ref 135–420)
POTASSIUM SERPL-SCNC: 3.8 MMOL/L (ref 3.5–5.5)
PROT SERPL-MCNC: 7.6 G/DL (ref 6.4–8.2)
PROT UR STRIP-MCNC: NEGATIVE MG/DL
RBC # BLD AUTO: 4.08 M/UL (ref 4.2–5.3)
SODIUM SERPL-SCNC: 135 MMOL/L (ref 136–145)
SP GR UR REFRACTOMETRY: 1.01 (ref 1–1.03)
SPECIMEN EXP DATE BLD: NORMAL
STATUS OF UNIT,%ST: NORMAL
STATUS OF UNIT,%ST: NORMAL
UNIT DIVISION, %UDIV: 0
UNIT DIVISION, %UDIV: 0
UROBILINOGEN UR QL STRIP.AUTO: 1 EU/DL (ref 0.2–1)
WBC # BLD AUTO: 18.3 K/UL (ref 4.6–13.2)

## 2020-09-04 PROCEDURE — 96374 THER/PROPH/DIAG INJ IV PUSH: CPT

## 2020-09-04 PROCEDURE — 83605 ASSAY OF LACTIC ACID: CPT

## 2020-09-04 PROCEDURE — 87040 BLOOD CULTURE FOR BACTERIA: CPT

## 2020-09-04 PROCEDURE — 74011250636 HC RX REV CODE- 250/636: Performed by: PHYSICIAN ASSISTANT

## 2020-09-04 PROCEDURE — 96368 THER/DIAG CONCURRENT INF: CPT

## 2020-09-04 PROCEDURE — 71045 X-RAY EXAM CHEST 1 VIEW: CPT

## 2020-09-04 PROCEDURE — 80053 COMPREHEN METABOLIC PANEL: CPT

## 2020-09-04 PROCEDURE — 74011000258 HC RX REV CODE- 258: Performed by: PHYSICIAN ASSISTANT

## 2020-09-04 PROCEDURE — 96366 THER/PROPH/DIAG IV INF ADDON: CPT

## 2020-09-04 PROCEDURE — 81025 URINE PREGNANCY TEST: CPT

## 2020-09-04 PROCEDURE — 85025 COMPLETE CBC W/AUTO DIFF WBC: CPT

## 2020-09-04 PROCEDURE — 81003 URINALYSIS AUTO W/O SCOPE: CPT

## 2020-09-04 PROCEDURE — 96375 TX/PRO/DX INJ NEW DRUG ADDON: CPT

## 2020-09-04 PROCEDURE — 74177 CT ABD & PELVIS W/CONTRAST: CPT

## 2020-09-04 PROCEDURE — 96365 THER/PROPH/DIAG IV INF INIT: CPT

## 2020-09-04 PROCEDURE — 74011000636 HC RX REV CODE- 636: Performed by: EMERGENCY MEDICINE

## 2020-09-04 PROCEDURE — 82550 ASSAY OF CK (CPK): CPT

## 2020-09-04 PROCEDURE — 83690 ASSAY OF LIPASE: CPT

## 2020-09-04 PROCEDURE — 99282 EMERGENCY DEPT VISIT SF MDM: CPT

## 2020-09-04 RX ORDER — ONDANSETRON 4 MG/1
4 TABLET, ORALLY DISINTEGRATING ORAL
Qty: 15 TAB | Refills: 0 | Status: SHIPPED | OUTPATIENT
Start: 2020-09-04 | End: 2020-09-11

## 2020-09-04 RX ORDER — DIPHENHYDRAMINE HYDROCHLORIDE 50 MG/ML
50 INJECTION, SOLUTION INTRAMUSCULAR; INTRAVENOUS
Status: COMPLETED | OUTPATIENT
Start: 2020-09-04 | End: 2020-09-04

## 2020-09-04 RX ORDER — LEVOFLOXACIN 5 MG/ML
500 INJECTION, SOLUTION INTRAVENOUS EVERY 24 HOURS
Status: DISCONTINUED | OUTPATIENT
Start: 2020-09-04 | End: 2020-09-04 | Stop reason: HOSPADM

## 2020-09-04 RX ORDER — VANCOMYCIN/0.9 % SOD CHLORIDE 1.5G/250ML
1500 PLASTIC BAG, INJECTION (ML) INTRAVENOUS ONCE
Status: COMPLETED | OUTPATIENT
Start: 2020-09-04 | End: 2020-09-04

## 2020-09-04 RX ADMIN — PIPERACILLIN AND TAZOBACTAM 3.38 G: 3; .375 INJECTION, POWDER, LYOPHILIZED, FOR SOLUTION INTRAVENOUS at 14:23

## 2020-09-04 RX ADMIN — LEVOFLOXACIN 500 MG: 5 INJECTION, SOLUTION INTRAVENOUS at 14:00

## 2020-09-04 RX ADMIN — IOPAMIDOL 95 ML: 612 INJECTION, SOLUTION INTRAVENOUS at 13:30

## 2020-09-04 RX ADMIN — SODIUM CHLORIDE 1000 ML: 900 INJECTION, SOLUTION INTRAVENOUS at 14:19

## 2020-09-04 RX ADMIN — DIPHENHYDRAMINE HYDROCHLORIDE 50 MG: 50 INJECTION, SOLUTION INTRAMUSCULAR; INTRAVENOUS at 15:52

## 2020-09-04 RX ADMIN — VANCOMYCIN HYDROCHLORIDE 1500 MG: 10 INJECTION, POWDER, LYOPHILIZED, FOR SOLUTION INTRAVENOUS at 15:08

## 2020-09-04 RX ADMIN — METHYLPREDNISOLONE SODIUM SUCCINATE 125 MG: 125 INJECTION, POWDER, FOR SOLUTION INTRAMUSCULAR; INTRAVENOUS at 15:51

## 2020-09-04 NOTE — ED PROVIDER NOTES
EMERGENCY DEPARTMENT HISTORY AND PHYSICAL EXAM    Date: 9/4/2020  Patient Name: Indu Cochran    History of Presenting Illness     Chief Complaint   Patient presents with    Abdominal Pain         History Provided By: Patient    Chief Complaint: Abdominal pain, dark urine, nausea and vomiting  Duration: 2 days  Timing: Acute  Location: Epigastric  Quality: Aching  Severity: Moderate to severe  Modifying Factors: N/A  Associated Symptoms: none       Additional History (Context): Indu Cochran is a 41 y.o. female with a history of iron deficiency anemia who presents today for history as listed above. Patient reports she initially thought she was allergic to her blood transfusion yesterday when she was on her second bag of blood and she had acute onset of abdominal pain, nausea and vomiting. Reports that they did treat her like an allergy and she felt better after however the abdominal pain has persisted. Patient denies any history of surgeries to her abdomen. Reports dark urine but denies any other urinary symptoms. Denies any diarrhea or constipation. Denies any knowledge of having a fever and denies chills. Has not tried anything for this at home. Denies any difficulties eating fatty foods or stool that is floating.       PCP: Jericho Bardales MD    Current Facility-Administered Medications   Medication Dose Route Frequency Provider Last Rate Last Dose    piperacillin-tazobactam (ZOSYN) 3.375 g in 0.9% sodium chloride (MBP/ADV) 100 mL MBP  3.375 g IntraVENous Q8H Sonya Queen PA        vancomycin (VANCOCIN) 1500 mg in  ml infusion  1,500 mg IntraVENous ONCE [facility] mL/hr at 09/04/20 1508 1,500 mg at 09/04/20 1508    levoFLOXacin (LEVAQUIN) 500 mg in D5W IVPB  500 mg IntraVENous Q24H MARILY Donaldson 100 mL/hr at 09/04/20 1400 500 mg at 09/04/20 1400     Current Outpatient Medications   Medication Sig Dispense Refill    ondansetron (ZOFRAN ODT) 4 mg disintegrating tablet Take 1 Tab by mouth every eight (8) hours as needed for Nausea or Vomiting. 15 Tab 0     Facility-Administered Medications Ordered in Other Encounters   Medication Dose Route Frequency Provider Last Rate Last Dose    0.9% sodium chloride infusion 250 mL  250 mL IntraVENous PRFERCHO Cui MD   Stopped at 09/03/20 1155    sodium chloride (NS) flush 10-40 mL  10-40 mL IntraVENous PRN López Cui MD   10 mL at 09/03/20 0835    diphenhydrAMINE (BENADRYL) capsule 25 mg  25 mg Oral Q6H PRN López Cui MD   25 mg at 09/03/20 0848    0.9% sodium chloride infusion 250 mL  250 mL IntraVENous PRFERCHO Cui MD   Stopped at 09/03/20 1355       Past History     Past Medical History:  Past Medical History:   Diagnosis Date    Anemia     Anemia     Dizziness     Iron deficiency anemia 5/13/2019    Menorrhagia with regular cycle 6/13/2019    Overweight (BMI 25.0-29.9) 5/13/2019       Past Surgical History:  History reviewed. No pertinent surgical history. Family History:  Family History   Problem Relation Age of Onset    Hypertension Mother     Stroke Maternal Grandmother     Breast Problems Other         Mother's cousin       Social History:  Social History     Tobacco Use    Smoking status: Never Smoker    Smokeless tobacco: Never Used   Substance Use Topics    Alcohol use: Not Currently    Drug use: Never       Allergies:  No Known Allergies      Review of Systems   Review of Systems   Constitutional: Negative for chills and fever. HENT: Negative for congestion, rhinorrhea and sore throat. Respiratory: Negative for cough and shortness of breath. Cardiovascular: Negative for chest pain. Gastrointestinal: Positive for abdominal pain, nausea and vomiting. Negative for blood in stool, constipation and diarrhea. Genitourinary: Negative for dysuria, frequency and hematuria. Musculoskeletal: Negative for back pain and myalgias.    Skin: Negative for rash and wound.   Neurological: Negative for dizziness and headaches. All other systems reviewed and are negative. All Other Systems Negative  Physical Exam     Vitals:    09/04/20 1017   BP: 117/64   Pulse: 70   Resp: 18   Temp: 98.6 °F (37 °C)   SpO2: 100%   Weight: 70.3 kg (155 lb)   Height: 5' 6\" (1.676 m)     Physical Exam  Vitals signs and nursing note reviewed. Constitutional:       General: She is not in acute distress. Appearance: She is well-developed. She is not diaphoretic. Comments: Overall well-appearing, no acute distress   HENT:      Head: Normocephalic and atraumatic. Eyes:      Conjunctiva/sclera: Conjunctivae normal.   Neck:      Musculoskeletal: Normal range of motion and neck supple. Cardiovascular:      Rate and Rhythm: Normal rate and regular rhythm. Heart sounds: Normal heart sounds. Pulmonary:      Effort: Pulmonary effort is normal. No respiratory distress. Breath sounds: Normal breath sounds. Chest:      Chest wall: No tenderness. Abdominal:      General: Bowel sounds are normal. There is no distension. Palpations: Abdomen is soft. Tenderness: There is abdominal tenderness in the right upper quadrant. There is no guarding or rebound. Comments: No true Alvarez sign, guarding or peritoneal signs however there is some tenderness in the right upper quadrant   Musculoskeletal:         General: No deformity. Skin:     General: Skin is warm and dry. Neurological:      Mental Status: She is alert and oriented to person, place, and time. Deep Tendon Reflexes: Reflexes are normal and symmetric.            Diagnostic Study Results     Labs -     Recent Results (from the past 12 hour(s))   URINALYSIS W/ RFLX MICROSCOPIC    Collection Time: 09/04/20 10:20 AM   Result Value Ref Range    Color DARK YELLOW      Appearance CLOUDY      Specific gravity 1.014 1.005 - 1.030      pH (UA) 5.5 5.0 - 8.0      Protein Negative NEG mg/dL    Glucose Negative NEG mg/dL Ketone Negative NEG mg/dL    Bilirubin Negative NEG      Blood Negative NEG      Urobilinogen 1.0 0.2 - 1.0 EU/dL    Nitrites Negative NEG      Leukocyte Esterase Negative NEG     HCG URINE, QL    Collection Time: 09/04/20 10:20 AM   Result Value Ref Range    HCG urine, QL Negative NEG     CBC WITH AUTOMATED DIFF    Collection Time: 09/04/20 10:26 AM   Result Value Ref Range    WBC 18.3 (H) 4.6 - 13.2 K/uL    RBC 4.08 (L) 4.20 - 5.30 M/uL    HGB 8.4 (L) 12.0 - 16.0 g/dL    HCT 29.0 (L) 35.0 - 45.0 %    MCV 71.1 (L) 74.0 - 97.0 FL    MCH 20.6 (L) 24.0 - 34.0 PG    MCHC 29.0 (L) 31.0 - 37.0 g/dL    RDW 22.1 (H) 11.6 - 14.5 %    PLATELET 878 260 - 847 K/uL    NEUTROPHILS 91 (H) 40 - 73 %    LYMPHOCYTES 5 (L) 21 - 52 %    MONOCYTES 3 3 - 10 %    EOSINOPHILS 1 0 - 5 %    BASOPHILS 0 0 - 2 %    ABS. NEUTROPHILS 16.7 (H) 1.8 - 8.0 K/UL    ABS. LYMPHOCYTES 0.8 (L) 0.9 - 3.6 K/UL    ABS. MONOCYTES 0.5 0.05 - 1.2 K/UL    ABS. EOSINOPHILS 0.2 0.0 - 0.4 K/UL    ABS. BASOPHILS 0.0 0.0 - 0.1 K/UL    DF AUTOMATED     METABOLIC PANEL, COMPREHENSIVE    Collection Time: 09/04/20 10:26 AM   Result Value Ref Range    Sodium 135 (L) 136 - 145 mmol/L    Potassium 3.8 3.5 - 5.5 mmol/L    Chloride 108 100 - 111 mmol/L    CO2 23 21 - 32 mmol/L    Anion gap 4 3.0 - 18 mmol/L    Glucose 116 (H) 74 - 99 mg/dL    BUN 9 7.0 - 18 MG/DL    Creatinine 0.73 0.6 - 1.3 MG/DL    BUN/Creatinine ratio 12 12 - 20      GFR est AA >60 >60 ml/min/1.73m2    GFR est non-AA >60 >60 ml/min/1.73m2    Calcium 8.9 8.5 - 10.1 MG/DL    Bilirubin, total 3.1 (H) 0.2 - 1.0 MG/DL    ALT (SGPT) 223 (H) 13 - 56 U/L    AST (SGOT) 145 (H) 10 - 38 U/L    Alk.  phosphatase 64 45 - 117 U/L    Protein, total 7.6 6.4 - 8.2 g/dL    Albumin 4.1 3.4 - 5.0 g/dL    Globulin 3.5 2.0 - 4.0 g/dL    A-G Ratio 1.2 0.8 - 1.7     LIPASE    Collection Time: 09/04/20 10:26 AM   Result Value Ref Range    Lipase 83 73 - 393 U/L   CK    Collection Time: 09/04/20 12:50 PM   Result Value Ref Range    CK 32 26 - 192 U/L   POC LACTIC ACID    Collection Time: 09/04/20 12:53 PM   Result Value Ref Range    Lactic Acid (POC) 1.05 0.40 - 2.00 mmol/L       Radiologic Studies -   CT ABD PELV W CONT   Final Result   IMPRESSION:         1. Large uterine body subendometrial fibroid. Trace amount fluid in cul-de-sac. 2. Small hiatal hernia. 2 mm left lower lobe pulmonary nodule almost certainly   benign. 3. Small fat-containing umbilical hernia. Mildly malrotated right kidney. XR CHEST PORT    (Results Pending)     CT Results  (Last 48 hours)               09/04/20 1330  CT ABD PELV W CONT Final result    Impression:  IMPRESSION:           1. Large uterine body subendometrial fibroid. Trace amount fluid in cul-de-sac. 2. Small hiatal hernia. 2 mm left lower lobe pulmonary nodule almost certainly   benign. 3. Small fat-containing umbilical hernia. Mildly malrotated right kidney. Narrative:  EXAM: CT ABDOMEN AND PELVIS WITH CONTRAST       INDICATION: Dysfunctional uterine bleeding. Acute onset vomiting after blood   transfusion. COMPARISON: None. TECHNIQUE: Axial CT imaging of the abdomen and pelvis was performed with   intravenous contrast. Multiplanar reformats were generated. One or more dose   reduction techniques were used on this CT: automated exposure control,   adjustment of the mAs and/or kVp according to patient size, and iterative   reconstruction techniques. The specific techniques used on this CT exam have   been documented in the patient's electronic medical record. Digital Imaging and   Communications in Medicine (DICOM) format image data are available to   nonaffiliated external healthcare facilities or entities on a secure, media   free, reciprocally searchable basis with patient authorization for at least a   12-month period after this study       _______________       FINDINGS:       LOWER CHEST: 2 mm left lower lobe pulmonary nodule image 27.  Lung bases otherwise clear. Small hiatal hernia. LIVER, BILIARY: Liver is normal. No biliary dilation. Gallbladder unremarkable. PANCREAS: Normal.       SPLEEN: Normal.       ADRENALS: Normal.       KIDNEYS/URETERS/BLADDER: Mild malrotation right kidney. Kidneys otherwise   unremarkable. Urinary bladder appears normal.       LYMPH NODES: No enlarged lymph nodes. GASTROINTESTINAL TRACT: No bowel dilation or wall thickening. Normal appendix. PELVIC ORGANS: Enlarged lobular and heterogeneous uterus due to multiple   leiomyomata. Poorly defined subendometrial uterine body fibroid grossly measures   about 5.4 cm with distortion and effacement of endometrium. Trace amount of   fluid in left cul-de-sac. VASCULATURE: Unremarkable. BONES: No acute or aggressive osseous abnormalities identified. OTHER: Small fat-containing umbilical hernia.       _______________               CXR Results  (Last 48 hours)    None            Medical Decision Making   I am the first provider for this patient. I reviewed the vital signs, available nursing notes, past medical history, past surgical history, family history and social history. Vital Signs-Reviewed the patient's vital signs. Records Reviewed: Nursing Notes and Old Medical Records     Procedures: None   Procedures    Provider Notes (Medical Decision Making):     Differential: UTI, pyelonephritis, cholecystitis, cholangitis, pancreatitis      Plan: Have discussed with patient I do not feel she had a true allergic reaction to her blood transfusion yesterday, have discussed I think it is coincidence that her pain/nausea and vomiting occurred during her transfusion. Patient does have a white count of 18.3 however does not meet any other sirs criteria at this time. Will order lactic, blood cultures and antibiotics regardless. Will order fluids. Will order CT of abdomen and pelvis.     2:47 PM  Have discussed at length multiple times findings of elevated white blood cell count and elevated LFTs. Patient denies any IV drug abuse or alcohol abuse. Will order chest x-ray to evaluate for pneumonia however if negative have discussed have not found any sources of infection. 3:35 PM  Have discussed reassuring chest x-ray with patient. Have stressed the importance of close follow-up with PCP. Have given strict return precautions. Will discharge home with Zofran. Have stressed importance of hydration and rest.      MED RECONCILIATION:  Current Facility-Administered Medications   Medication Dose Route Frequency    piperacillin-tazobactam (ZOSYN) 3.375 g in 0.9% sodium chloride (MBP/ADV) 100 mL MBP  3.375 g IntraVENous Q8H    vancomycin (VANCOCIN) 1500 mg in  ml infusion  1,500 mg IntraVENous ONCE    levoFLOXacin (LEVAQUIN) 500 mg in D5W IVPB  500 mg IntraVENous Q24H     Current Outpatient Medications   Medication Sig    ondansetron (ZOFRAN ODT) 4 mg disintegrating tablet Take 1 Tab by mouth every eight (8) hours as needed for Nausea or Vomiting. Facility-Administered Medications Ordered in Other Encounters   Medication Dose Route Frequency    0.9% sodium chloride infusion 250 mL  250 mL IntraVENous PRN    sodium chloride (NS) flush 10-40 mL  10-40 mL IntraVENous PRN    diphenhydrAMINE (BENADRYL) capsule 25 mg  25 mg Oral Q6H PRN    0.9% sodium chloride infusion 250 mL  250 mL IntraVENous PRN       Disposition:  Home     DISCHARGE NOTE:   Pt has been reexamined. Patient has no new complaints, changes, or physical findings. Care plan outlined and precautions discussed. Results of workup were reviewed with the patient. All medications were reviewed with the patient. All of pt's questions and concerns were addressed. Patient was instructed and agrees to follow up with PCP as well as to return to the ED upon further deterioration. Patient is ready to go home.     Follow-up Information     Follow up With Specialties Details Why Contact Info [facility] EMERGENCY DEPT Emergency Medicine  As needed [address] [place] [address]    Gregory Rodriguez MD Internal Medicine Schedule an appointment as soon as possible for a visit  [place] [id number]  [phone]            Current Discharge Medication List      START taking these medications    Details   ondansetron (ZOFRAN ODT) 4 mg disintegrating tablet Take 1 Tab by mouth every eight (8) hours as needed for Nausea or Vomiting. Qty: 15 Tab, Refills: 0                 Diagnosis     Clinical Impression:   1. Leukocytosis, unspecified type    2. Abdominal pain, epigastric    3. Elevated liver enzymes    4. Hyponatremia          \"Please note that this dictation was completed with [employer], the computer voice recognition software. Quite often unanticipated grammatical, syntax, homophones, and other interpretive errors are inadvertently transcribed by the computer software. Please disregard these errors. Please excuse any errors that have escaped final proofreading. \"

## 2020-09-04 NOTE — ED TRIAGE NOTES
Patient hx fibroids that cause excessive bleeding during menstrual cycles. Patient usually receives iron transfusions but yesterday was scheduled for 2 units PRBC. Patient received 1 unit without problems. During the 2nd unit, patient had severe reaction. Began to vomit and shake. States she was given several medications and sent home. Woke up still feeling bad.

## 2020-09-04 NOTE — DISCHARGE INSTRUCTIONS
Patient Education        Learning About High White Blood Cell Counts  What are white blood cells? White blood cells (leukocytes) help protect your body from infection. Normally, when germs get inside your body, your body makes more white blood cells that search for and destroy the germs. Less often, there are medical problems where the body may make a lot more white blood cells than it needs. What happens when you have a high white blood cell count? Your white blood cell count may be high because your body is fighting an infection. But other things can cause it, such as some medicines, burns, an illness, or other health problems. When your doctor sees that your white blood cell count is high, he or she will try to find out why, and then treat the cause. What are the symptoms? A high white blood cell count alone doesn't cause any symptoms. The symptoms you feel may come from the medical problem that your white blood cells are fighting. For example, if you have pneumonia, you may have a fever and trouble breathing. These are symptoms of pneumonia, not of a high white blood cell count. How is it treated? · Your doctor may do more tests to find the problem that's making your white blood cell count high. Once your doctor finds the problem, he or she may be able to treat it. · Part of your treatment may be telling your doctor if you feel worse. Watch your temperature, and call your doctor if your fever goes up and stays up. Follow-up care is a key part of your treatment and safety. Be sure to make and go to all appointments, and call your doctor if you are having problems. It's also a good idea to know your test results and keep a list of the medicines you take. Where can you learn more? Go to http://henny-vincent.info/  Enter V383 in the search box to learn more about \"Learning About High White Blood Cell Counts. \"  Current as of: December 9, 2019               Content Version: 12.6  © 9233-6669 [employer], [employer]. Care instructions adapted under license by [employer] (which disclaims liability or warranty for this information). If you have questions about a medical condition or this instruction, always ask your healthcare professional. [place] [address] any warranty or liability for your use of this information.

## 2020-09-09 ENCOUNTER — APPOINTMENT (OUTPATIENT)
Dept: INFUSION THERAPY | Age: 45
End: 2020-09-09

## 2020-09-09 ENCOUNTER — VIRTUAL VISIT (OUTPATIENT)
Dept: ONCOLOGY | Age: 45
End: 2020-09-09

## 2020-09-09 DIAGNOSIS — N92.4 EXCESSIVE BLEEDING IN PREMENOPAUSAL PERIOD: ICD-10-CM

## 2020-09-09 DIAGNOSIS — E80.6 HYPERBILIRUBINEMIA: ICD-10-CM

## 2020-09-09 DIAGNOSIS — D50.9 IRON DEFICIENCY ANEMIA, UNSPECIFIED IRON DEFICIENCY ANEMIA TYPE: ICD-10-CM

## 2020-09-09 DIAGNOSIS — D50.9 IRON DEFICIENCY ANEMIA, UNSPECIFIED IRON DEFICIENCY ANEMIA TYPE: Primary | ICD-10-CM

## 2020-09-09 DIAGNOSIS — D50.0 IRON DEFICIENCY ANEMIA DUE TO CHRONIC BLOOD LOSS: ICD-10-CM

## 2020-09-09 NOTE — PROGRESS NOTES
Nader Ramirez is a 41 y.o. female who was seen by synchronous (real-time) audio- technology on 9/9/2020. Assessment & Plan:   Diagnoses and all orders for this visit:    1. Iron deficiency anemia, unspecified iron deficiency anemia type  -     CBC WITH AUTOMATED DIFF; Future  -     METABOLIC PANEL, COMPREHENSIVE; Future  -     BILIRUBIN, DIRECT; Future    2. Excessive bleeding in premenopausal period  -     CBC WITH AUTOMATED DIFF; Future  -     METABOLIC PANEL, COMPREHENSIVE; Future  -     BILIRUBIN, DIRECT; Future    3. Hyperbilirubinemia  -     LD; Future  -     HAPTOGLOBIN; Future  -     DIRECT BOYD; Future        The complexity of medical decision making for this visit is moderate       1. Iron deficiency anemia due to chronic blood loss  Patient is again severely iron deficient with a ferritin of 2 and transferrin saturation of 3%. Unfortunately unless his heavy menstrual bleeding are controlled she is going to keep happening. *S/p  IV Injectafer x2  *Unfortunately, she says Soha Donato is not working. She needs therefore to be seen by OBGYN again to give her birth control pill or surgical procedure. Labs on 8/31/2020 show a ferritin of 3, transferrin saturation 3%, normal BUN and creatinine, WBC 3.6, H&H 6.1/22.7, MCV 71, platelets 747. She had blood transfusion with  on 9/03/20 and had some reaction with some nausea and vomiting. Patient again has severe iron deficiency anemia. *Follow-up with OB/GYN for menorrhagia which is a source of her iron deficiency most likely.        2. Menorrhagia with regular cycle  *Lysteda not working. F/U with OBGYN  *She said she finally decided to have total abdominal hysterectomy. She has appointment with OB/GYN in October.     3. Overweight (BMI 25.0-29. 9)  Diet exercise and weight loss program needed.  Follow-up with PCP. 4. Transaminitis/hyperbilirubinemia: Recheck labs again to make sure this is resolving.  Otherwise will need acute hepatitis panel if still high. Patient is seeing PCP on Friday, 9/11/2020 and I asked her to have lab drawn at the PCP office. I will check LDH, haptoglobin, CBC, direct bilirubin and CMP.    I spent at least 15 minutes on this visit with this established patient. [id number]  Subjective: Xiomara Hung is a 38 y.o. 1975 female with who I was asked to see in consultation at the request of Dr. Walter Robertson evaluation for anemia. I saw her on 7/03/19 in consultation. She was found to have severe iron deficiency anemia and she is status post IV iron infusion with Injectafer x2 completed on 7/19/2019. She is iron deficient again. Had 2 units pRBCs on 9/3/20 with some reaction. Went to ER on 9/4/20. Today she says seeing feeling much better. She denies any fevers, chills, shortness of breath, nausea vomiting or abdominal pain. No more bleeding. All other points of review of system have been reviewed and were negative.           Prior to Admission medications    Medication Sig Start Date End Date Taking? Authorizing Provider   ondansetron (ZOFRAN ODT) 4 mg disintegrating tablet Take 1 Tab by mouth every eight (8) hours as needed for Nausea or Vomiting. 9/4/20   MARILY Smith     Patient Active Problem List   Diagnosis Code    Iron deficiency anemia D50.9    Overweight (BMI 25.0-29. 9) [id number]    Menorrhagia with regular cycle N92.0     Patient Active Problem List    Diagnosis Date Noted    Menorrhagia with regular cycle 06/13/2019    Iron deficiency anemia 05/13/2019    Overweight (BMI 25.0-29.9) 05/13/2019     Current Outpatient Medications   Medication Sig Dispense Refill    ondansetron (ZOFRAN ODT) 4 mg disintegrating tablet Take 1 Tab by mouth every eight (8) hours as needed for Nausea or Vomiting.  15 Tab 0     No Known Allergies  Past Medical History:   Diagnosis Date    Anemia     Anemia     Dizziness     Iron deficiency anemia 5/13/2019    Menorrhagia with regular cycle 6/13/2019    Overweight (BMI 25.0-29.9) 5/13/2019     History reviewed. No pertinent surgical history. Family History   Problem Relation Age of Onset    Hypertension Mother     Stroke Maternal Grandmother     Breast Problems Other         Mother's cousin     Social History     Tobacco Use    Smoking status: Never Smoker    Smokeless tobacco: Never Used   Substance Use Topics    Alcohol use: Not Currently       ROS:As per HPI      Objective:   No flowsheet data found. General: alert, cooperative, no distress     Additional exam findings: We discussed the expected course, resolution and complications of the diagnosis(es) in detail. Medication risks, benefits, costs, interactions, and alternatives were discussed as indicated. I advised her to contact the office if her condition worsens, changes or fails to improve as anticipated. She expressed understanding with the diagnosis(es) and plan. Sejal Duarte, who was evaluated through a patient-initiated, synchronous (real-time) audio- encounter, and/or her healthcare decision maker, is aware that it is a billable service, with coverage as determined by her insurance carrier. She provided verbal consent to proceed: Yes, and patient identification was verified. It was conducted pursuant to the emergency declaration under the [facility], [address] [place] [address] authority and the [employer] Act. A caregiver was present when appropriate. Ability to conduct physical exam was limited. I was at home. The patient was at home.       Moni Braxton MD

## 2020-09-10 LAB
BACTERIA SPEC CULT: NORMAL
BACTERIA SPEC CULT: NORMAL
SERVICE CMNT-IMP: NORMAL
SERVICE CMNT-IMP: NORMAL

## 2020-09-11 ENCOUNTER — VIRTUAL VISIT (OUTPATIENT)
Dept: FAMILY MEDICINE CLINIC | Facility: CLINIC | Age: 45
End: 2020-09-11

## 2020-09-11 DIAGNOSIS — E66.3 OVERWEIGHT: ICD-10-CM

## 2020-09-11 DIAGNOSIS — N92.0 MENORRHAGIA WITH REGULAR CYCLE: Primary | ICD-10-CM

## 2020-09-11 DIAGNOSIS — Z13.31 SCREENING FOR DEPRESSION: ICD-10-CM

## 2020-09-11 DIAGNOSIS — D50.0 IRON DEFICIENCY ANEMIA DUE TO CHRONIC BLOOD LOSS: ICD-10-CM

## 2020-09-11 NOTE — PATIENT INSTRUCTIONS
Anemia From Heavy Bleeding: Care Instructions Your Care Instructions Anemia means that your body does not have enough red blood cells. Red blood cells carry oxygen around the body. When you have anemia, you may feel dizzy, tired, and weak. You may also feel your heart pounding. For some people, it's hard to focus and think clearly. One common cause of anemia is bleeding. Bleeding from ulcers, hemorrhoids, cancer, or other problems can cause anemia. It may also be caused by heavy menstrual periods. Your treatment may include iron pills. Iron helps your body make hemoglobin. Hemoglobin is the part of the red blood cell that carries oxygen. If you have severe anemia, you may need a blood transfusion to give you red blood cells as quickly as possible. Sometimes it takes several months to get iron levels back to normal. 
Follow-up care is a key part of your treatment and safety. Be sure to make and go to all appointments, and call your doctor if you are having problems. It's also a good idea to know your test results and keep a list of the medicines you take. How can you care for yourself at home? · Be safe with medicines. Take your medicines exactly as prescribed. Call your doctor if you think you are having a problem with your medicine. · Follow your doctor's advice about eating foods that have a lot of iron in them. These include red meat, shellfish, poultry, and eggs. They also include beans, raisins, whole-grain bread, and leafy green vegetables. · Steam your vegetables. This is the best way to prepare them if you want to get as much iron as possible. · Iron pills can cause constipation. If you take them, there are things you can do to avoid constipation. Drink plenty of fluids, eat foods with a lot of fiber, and exercise every day. When should you call for help? Call 911 anytime you think you may need emergency care. For example, call if: 
  · You passed out (lost consciousness).   · Your stools are maroon or very bloody. Call your doctor now or seek immediate medical care if: 
  · You are short of breath.  
  · You have new or worse bleeding.  
  · You are dizzy or light-headed, or you feel like you may faint. Watch closely for changes in your health, and be sure to contact your doctor if: 
  · You feel weaker or more tired than usual.  
  · You do not get better as expected. Where can you learn more? Go to http://www.gray.com/ Enter H840 in the search box to learn more about \"Anemia From Heavy Bleeding: Care Instructions. \" Current as of: November 8, 2019               Content Version: 12.6 © 6368-8252 [employer]. Care instructions adapted under license by [employer] (which disclaims liability or warranty for this information). If you have questions about a medical condition or this instruction, always ask your healthcare professional. [address] any warranty or liability for your use of this information.

## 2020-09-16 ENCOUNTER — HOSPITAL ENCOUNTER (OUTPATIENT)
Dept: INFUSION THERAPY | Age: 45
Discharge: HOME | End: 2020-09-16
Payer: MEDICAID

## 2020-09-16 VITALS
TEMPERATURE: 98.9 F | RESPIRATION RATE: 18 BRPM | DIASTOLIC BLOOD PRESSURE: 60 MMHG | SYSTOLIC BLOOD PRESSURE: 100 MMHG | HEART RATE: 79 BPM

## 2020-09-16 DIAGNOSIS — D50.0 IRON DEFICIENCY ANEMIA DUE TO CHRONIC BLOOD LOSS: Primary | ICD-10-CM

## 2020-09-16 DIAGNOSIS — D50.0 IRON DEFICIENCY ANEMIA DUE TO CHRONIC BLOOD LOSS: ICD-10-CM

## 2020-09-16 PROCEDURE — 74011250636 HC RX REV CODE- 250/636: Performed by: INTERNAL MEDICINE

## 2020-09-16 PROCEDURE — 96365 THER/PROPH/DIAG IV INF INIT: CPT

## 2020-09-16 RX ORDER — ONDANSETRON 2 MG/ML
8 INJECTION INTRAMUSCULAR; INTRAVENOUS AS NEEDED
Status: CANCELLED | OUTPATIENT
Start: 2020-09-23

## 2020-09-16 RX ORDER — DIPHENHYDRAMINE HYDROCHLORIDE 50 MG/ML
50 INJECTION, SOLUTION INTRAMUSCULAR; INTRAVENOUS AS NEEDED
Status: CANCELLED
Start: 2020-09-23

## 2020-09-16 RX ORDER — SODIUM CHLORIDE 9 MG/ML
25 INJECTION, SOLUTION INTRAVENOUS CONTINUOUS
Status: CANCELLED | OUTPATIENT
Start: 2020-09-23

## 2020-09-16 RX ORDER — HYDROCORTISONE SODIUM SUCCINATE 100 MG/2ML
100 INJECTION, POWDER, FOR SOLUTION INTRAMUSCULAR; INTRAVENOUS AS NEEDED
Status: CANCELLED | OUTPATIENT
Start: 2020-09-23

## 2020-09-16 RX ORDER — SODIUM CHLORIDE 9 MG/ML
25 INJECTION, SOLUTION INTRAVENOUS CONTINUOUS
Status: DISCONTINUED | OUTPATIENT
Start: 2020-09-16 | End: 2020-09-17 | Stop reason: HOSPADM

## 2020-09-16 RX ORDER — DIPHENHYDRAMINE HYDROCHLORIDE 50 MG/ML
25 INJECTION, SOLUTION INTRAMUSCULAR; INTRAVENOUS AS NEEDED
Status: CANCELLED
Start: 2020-09-23

## 2020-09-16 RX ORDER — ALBUTEROL SULFATE 0.83 MG/ML
2.5 SOLUTION RESPIRATORY (INHALATION) AS NEEDED
Status: CANCELLED
Start: 2020-09-23

## 2020-09-16 RX ORDER — EPINEPHRINE 1 MG/ML
0.3 INJECTION, SOLUTION, CONCENTRATE INTRAVENOUS AS NEEDED
Status: CANCELLED | OUTPATIENT
Start: 2020-09-23

## 2020-09-16 RX ORDER — SODIUM CHLORIDE 0.9 % (FLUSH) 0.9 %
10 SYRINGE (ML) INJECTION AS NEEDED
Status: CANCELLED | OUTPATIENT
Start: 2020-09-23

## 2020-09-16 RX ORDER — HEPARIN 100 UNIT/ML
300-500 SYRINGE INTRAVENOUS AS NEEDED
Status: CANCELLED
Start: 2020-09-23

## 2020-09-16 RX ORDER — SODIUM CHLORIDE 9 MG/ML
10 INJECTION INTRAMUSCULAR; INTRAVENOUS; SUBCUTANEOUS AS NEEDED
Status: CANCELLED | OUTPATIENT
Start: 2020-09-23

## 2020-09-16 RX ORDER — ACETAMINOPHEN 325 MG/1
650 TABLET ORAL AS NEEDED
Status: CANCELLED
Start: 2020-09-23

## 2020-09-16 RX ADMIN — SODIUM CHLORIDE 25 ML/HR: 9 INJECTION, SOLUTION INTRAVENOUS at 13:26

## 2020-09-16 RX ADMIN — FERRIC CARBOXYMALTOSE INJECTION 750 MG: 50 INJECTION, SOLUTION INTRAVENOUS at 13:26

## 2020-09-16 NOTE — PROGRESS NOTES
[facility] Progress Note    Date: 2020    Name: Ean Sibley    MRN: [id number]         : 1975    Injectafer Infusion 1 of 2    Ms. Rema Spaulding to [facility], [facility], at 1310. Pt was assessed and education was provided. Ms. Jamil Taveras vitals were reviewed and patient was observed for 5 minutes prior to treatment. Visit Vitals  /60 (BP 1 Location: Left arm, BP Patient Position: Sitting)   Pulse 79   Temp 98.9 °F (37.2 °C)   Resp 18   Breastfeeding No         24 g PIV placed in Right hand x 1 attempt by Josiephine Gowers, RN. PIV flushed easily and had brisk blood return. Injectafer 750 mg in 250 mL NS given over 20 min     Patient advised she had previous reaction of itching and redness around the infusion site. Patient was observed for 30 mins post infusion and denied complaints of itching, swelling, or redness. .     Ms. Rema Spaulding tolerated the infusion, and had no complaints. PIV flushed with NS 10 ml and removed. No bleeding or hematoma noted at site. Guaze and coban applied. Patient armband removed and shredded. Ms. Rema Spaulding was discharged from [place] [address] in stable condition at 1430. She is to return on 20 at 1300 for her next appointment.     Enrique Clement RN  2020  1:46 PM

## 2020-11-17 ENCOUNTER — TELEPHONE (OUTPATIENT)
Age: 45
End: 2020-11-17

## 2020-11-17 NOTE — TELEPHONE ENCOUNTER
Patient only received 1 dose of Injectafer in Sept. I attempted to call her today, she did not answer her phone. LVM at 1618. If she is weak and has no energy and not feeling well, patient needs to go to the ED.

## 2020-11-17 NOTE — TELEPHONE ENCOUNTER
Patient would like to know if she still need to have her last infusion, she has had 3 heavy menstruation in Sept-Nov, with no energy.

## 2020-12-02 ENCOUNTER — HOSPITAL ENCOUNTER (OUTPATIENT)
Dept: INFUSION THERAPY | Age: 45
Discharge: HOME | End: 2020-12-02
Payer: MEDICAID

## 2020-12-02 VITALS
OXYGEN SATURATION: 100 % | DIASTOLIC BLOOD PRESSURE: 59 MMHG | SYSTOLIC BLOOD PRESSURE: 122 MMHG | TEMPERATURE: 97.2 F | HEART RATE: 78 BPM

## 2020-12-02 DIAGNOSIS — E80.6 HYPERBILIRUBINEMIA: ICD-10-CM

## 2020-12-02 LAB
ALBUMIN SERPL-MCNC: 3.8 G/DL (ref 3.4–5)
ALBUMIN/GLOB SERPL: 1 {RATIO} (ref 0.8–1.7)
ALP SERPL-CCNC: 48 U/L (ref 45–117)
ALT SERPL-CCNC: 43 U/L (ref 13–56)
ANION GAP SERPL CALC-SCNC: 4 MMOL/L (ref 3–18)
AST SERPL-CCNC: 16 U/L (ref 10–38)
BASOPHILS # BLD: 0 K/UL (ref 0–0.1)
BASOPHILS NFR BLD: 1 % (ref 0–2)
BILIRUB DIRECT SERPL-MCNC: 0.1 MG/DL (ref 0–0.2)
BILIRUB SERPL-MCNC: 0.5 MG/DL (ref 0.2–1)
BUN SERPL-MCNC: 10 MG/DL (ref 7–18)
BUN/CREAT SERPL: 19 (ref 12–20)
CALCIUM SERPL-MCNC: 8.6 MG/DL (ref 8.5–10.1)
CHLORIDE SERPL-SCNC: 108 MMOL/L (ref 100–111)
CO2 SERPL-SCNC: 25 MMOL/L (ref 21–32)
CREAT SERPL-MCNC: 0.54 MG/DL (ref 0.6–1.3)
DAT POLY-SP REAG RBC QL: NORMAL
DIFFERENTIAL METHOD BLD: ABNORMAL
EOSINOPHIL # BLD: 0.2 K/UL (ref 0–0.4)
EOSINOPHIL NFR BLD: 7 % (ref 0–5)
ERYTHROCYTE [DISTWIDTH] IN BLOOD BY AUTOMATED COUNT: 21.1 % (ref 11.6–14.5)
GLOBULIN SER CALC-MCNC: 3.8 G/DL (ref 2–4)
GLUCOSE SERPL-MCNC: 92 MG/DL (ref 74–99)
HAPTOGLOB SERPL-MCNC: 103 MG/DL (ref 30–200)
HCT VFR BLD AUTO: 23.8 % (ref 35–45)
HGB BLD-MCNC: 6.7 G/DL (ref 12–16)
LDH SERPL L TO P-CCNC: 147 U/L (ref 81–234)
LYMPHOCYTES # BLD: 0.9 K/UL (ref 0.9–3.6)
LYMPHOCYTES NFR BLD: 25 % (ref 21–52)
MCH RBC QN AUTO: 19.9 PG (ref 24–34)
MCHC RBC AUTO-ENTMCNC: 28.2 G/DL (ref 31–37)
MCV RBC AUTO: 70.6 FL (ref 74–97)
MONOCYTES # BLD: 0.4 K/UL (ref 0.05–1.2)
MONOCYTES NFR BLD: 10 % (ref 3–10)
NEUTS SEG # BLD: 2 K/UL (ref 1.8–8)
NEUTS SEG NFR BLD: 57 % (ref 40–73)
PLATELET # BLD AUTO: 296 K/UL (ref 135–420)
POTASSIUM SERPL-SCNC: 3.7 MMOL/L (ref 3.5–5.5)
PROT SERPL-MCNC: 7.6 G/DL (ref 6.4–8.2)
RBC # BLD AUTO: 3.37 M/UL (ref 4.2–5.3)
RBC MORPH BLD: ABNORMAL
SODIUM SERPL-SCNC: 137 MMOL/L (ref 136–145)
WBC # BLD AUTO: 3.5 K/UL (ref 4.6–13.2)

## 2020-12-02 PROCEDURE — 82248 BILIRUBIN DIRECT: CPT

## 2020-12-02 PROCEDURE — 85025 COMPLETE CBC W/AUTO DIFF WBC: CPT

## 2020-12-02 PROCEDURE — 83615 LACTATE (LD) (LDH) ENZYME: CPT

## 2020-12-02 PROCEDURE — 83010 ASSAY OF HAPTOGLOBIN QUANT: CPT

## 2020-12-02 PROCEDURE — 86880 COOMBS TEST DIRECT: CPT

## 2020-12-02 PROCEDURE — 36415 COLL VENOUS BLD VENIPUNCTURE: CPT

## 2020-12-02 PROCEDURE — 80053 COMPREHEN METABOLIC PANEL: CPT

## 2020-12-02 NOTE — PROGRESS NOTES
[address] [facility] [place] Progress Note    Date: 2020    Name: Pascale Rodrigues    MRN: [id number]         : 1975    Peripheral Lab Draw      Ms. Lindsey Rodriguez to [facility], ambulatory at 0656 accompanied by self. Pt was assessed and education was provided. Ms. Saúl Perez vitals were reviewed and patient was observed for 5 minutes prior to treatment. Visit Vitals  BP (!) 122/59 (BP 1 Location: Right arm, BP Patient Position: Sitting)   Pulse 78   Temp 97.2 °F (36.2 °C)   SpO2 100%     Recent Results (from the past 12 hour(s))   METABOLIC PANEL, COMPREHENSIVE    Collection Time: 20 10:05 AM   Result Value Ref Range    Sodium 137 136 - 145 mmol/L    Potassium 3.7 3.5 - 5.5 mmol/L    Chloride 108 100 - 111 mmol/L    CO2 25 21 - 32 mmol/L    Anion gap 4 3.0 - 18 mmol/L    Glucose 92 74 - 99 mg/dL    BUN 10 7.0 - 18 MG/DL    Creatinine 0.54 (L) 0.6 - 1.3 MG/DL    BUN/Creatinine ratio 19 12 - 20      GFR est AA >60 >60 ml/min/1.73m2    GFR est non-AA >60 >60 ml/min/1.73m2    Calcium 8.6 8.5 - 10.1 MG/DL    Bilirubin, total 0.5 0.2 - 1.0 MG/DL    ALT (SGPT) 43 13 - 56 U/L    AST (SGOT) 16 10 - 38 U/L    Alk. phosphatase 48 45 - 117 U/L    Protein, total 7.6 6.4 - 8.2 g/dL    Albumin 3.8 3.4 - 5.0 g/dL    Globulin 3.8 2.0 - 4.0 g/dL    A-G Ratio 1.0 0.8 - 1.7     BILIRUBIN, DIRECT    Collection Time: 20 10:05 AM   Result Value Ref Range    Bilirubin, direct 0.1 0.0 - 0.2 MG/DL   CBC WITH AUTOMATED DIFF    Collection Time: 20 10:05 AM   Result Value Ref Range    WBC 3.5 (L) 4.6 - 13.2 K/uL    RBC 3.37 (L) 4.20 - 5.30 M/uL    HGB 6.7 (L) 12.0 - 16.0 g/dL    HCT 23.8 (L) 35.0 - 45.0 %    MCV 70.6 (L) 74.0 - 97.0 FL    MCH 19.9 (L) 24.0 - 34.0 PG    MCHC 28.2 (L) 31.0 - 37.0 g/dL    RDW 21.1 (H) 11.6 - 14.5 %    PLATELET 664 818 - 294 K/uL    NEUTROPHILS PENDING %    LYMPHOCYTES PENDING %    MONOCYTES PENDING %    EOSINOPHILS PENDING %    BASOPHILS PENDING %    ABS.  NEUTROPHILS PENDING K/UL    ABS. LYMPHOCYTES PENDING K/UL    ABS. MONOCYTES PENDING K/UL    ABS. EOSINOPHILS PENDING K/UL    ABS. BASOPHILS PENDING K/UL    DF PENDING        Blood obtained peripherally from left arm x 1 attempt with butterfly needle and sent to lab for Cbc w/diff, Cmp, Bilirubin, Direct, Ldh, Haptoglobin and Direct Darrin  per written orders. No bleeding or hematoma noted at site. Gauze and coban applied. Ms. Marlen Bolden tolerated the phlebotomy, and had no complaints. Patient armband removed and shredded. Ms. Marlen Bolden was discharged from [place] [address] in stable condition at 1005.      Vinicius Betts Phlebotomist PCT  December 2, 2020  2:11 PM

## 2020-12-08 ENCOUNTER — VIRTUAL VISIT (OUTPATIENT)
Age: 45
End: 2020-12-08
Payer: MEDICAID

## 2020-12-08 DIAGNOSIS — E80.6 HYPERBILIRUBINEMIA: ICD-10-CM

## 2020-12-08 DIAGNOSIS — N92.0 MENORRHAGIA WITH REGULAR CYCLE: ICD-10-CM

## 2020-12-08 DIAGNOSIS — E66.3 OVERWEIGHT (BMI 25.0-29.9): ICD-10-CM

## 2020-12-08 DIAGNOSIS — D50.0 IRON DEFICIENCY ANEMIA DUE TO CHRONIC BLOOD LOSS: Primary | ICD-10-CM

## 2020-12-08 PROCEDURE — 99442 PR PHYS/QHP TELEPHONE EVALUATION 11-20 MIN: CPT | Performed by: INTERNAL MEDICINE

## 2020-12-08 NOTE — PROGRESS NOTES
Vinny Cotton is a 41 y.o. female who was seen by synchronous (real-time) audio- technology on 12/8/2020. Assessment & Plan:   Diagnoses and all orders for this visit:    1. Iron deficiency anemia due to chronic blood loss    2. Menorrhagia with regular cycle    3. Overweight (BMI 25.0-29.9)    4. Hyperbilirubinemia      The complexity of medical decision making for this visit is moderate       1. Iron deficiency anemia due to chronic blood loss  Patient is again severely iron deficient with hemoglobin of 6.7, and MCV of 78. I told her that it is very important for her to follow-up with OB/GYN for her menses. I am also going to refer her to GI for colonoscopy. *Follow-up with OB/GYN for menorrhagia which is a source of her iron deficiency most likely. She is scheduled for SOFIA on 12/21/20. *Refer to GI for colonoscopy  *Give packed red blood cells transfusion 1 unit  *Give IV Injectafer x2     2. Menorrhagia with regular cycle: Scheduled for SOFIA on 12/21/20.     3. Overweight (BMI 25.0-29. 9)  Diet exercise and weight loss program needed.  Follow-up with PCP.     4. Transaminitis/hyperbilirubinemia: Resolved       I spent at least 25 minutes on this visit with this established patient. 712  Subjective: Vinny Cotton is a 41 y.o. 1975 female who I was asked to see in consultation at the request of Dr. Ekaterina Turner evaluation for anemia.  I saw her on 7/03/19 in consultation. Bashir Morales was found to have severe iron deficiency anemia and she is status post IV iron infusion with Injectafer x2 completed on 7/19/2019 and had all for Injectafer 1/2 on 9/16/2020. She unfortunately has very heavy menses which is still not been taking care of. Her most recent labs done on 12/02/2020 showed WBC 3.5, H&H 6.7/23.8, MCV 70.6, platelet 059. LDH normal.  Haptoglobin normal.  Normal total bilirubin. Normal BUN and creatinine. Today she says seeing feeling much better.   She denies any fevers, chills, shortness of breath, nausea vomiting or abdominal pain. Still having heavy menses. No melena or bright red blood per rectum. All other points of review of system have been reviewed and were negative. Prior to Admission medications    Not on File     Patient Active Problem List   Diagnosis Code    Iron deficiency anemia D50.9    Overweight (BMI 25.0-29. 9) E66.3    Menorrhagia with regular cycle N92.0    Hyperbilirubinemia E80.6     Patient Active Problem List    Diagnosis Date Noted    Hyperbilirubinemia 12/08/2020    Menorrhagia with regular cycle 06/13/2019    Iron deficiency anemia 05/13/2019    Overweight (BMI 25.0-29.9) 05/13/2019       No Known Allergies  Past Medical History:   Diagnosis Date    Anemia     Anemia     Dizziness     Iron deficiency anemia 5/13/2019    Menorrhagia with regular cycle 6/13/2019    Overweight (BMI 25.0-29.9) 5/13/2019     No past surgical history on file. Family History   Problem Relation Age of Onset    Hypertension Mother     Stroke Maternal Grandmother     Breast Problems Other         Mother's cousin     Social History     Tobacco Use    Smoking status: Never Smoker    Smokeless tobacco: Never Used   Substance Use Topics    Alcohol use: Yes     Alcohol/week: 1.0 standard drinks     Types: 1 Shots of liquor per week       ROS: As per HPI    Objective:     Patient-Reported Vitals 9/11/2020   Patient-Reported Weight 155   Patient-Reported Pulse 82   Patient-Reported Temperature 98.6   Patient-Reported SpO2 100   Patient-Reported Systolic  367   Patient-Reported Diastolic 64      General: alert, cooperative, no distress     Additional exam findings: We discussed the expected course, resolution and complications of the diagnosis(es) in detail. Medication risks, benefits, costs, interactions, and alternatives were discussed as indicated.   I advised her to contact the office if her condition worsens, changes or fails to improve as anticipated. She expressed understanding with the diagnosis(es) and plan. Dylan Weiner, who was evaluated through a patient-initiated, synchronous (real-time) audio- encounter, and/or her healthcare decision maker, is aware that it is a billable service, with coverage as determined by her insurance carrier. She provided verbal consent to proceed: Yes, and patient identification was verified. It was conducted pursuant to the emergency declaration under the [address] [place] [address] and the [employer] General Act. A caregiver was present when appropriate. Ability to conduct physical exam was limited. I was at home. The patient was at home.       David Klein MD

## 2020-12-09 ENCOUNTER — HOSPITAL ENCOUNTER (OUTPATIENT)
Dept: INFUSION THERAPY | Age: 45
Discharge: HOME | End: 2020-12-09
Payer: MEDICAID

## 2020-12-09 VITALS
TEMPERATURE: 97.8 F | SYSTOLIC BLOOD PRESSURE: 122 MMHG | HEART RATE: 80 BPM | DIASTOLIC BLOOD PRESSURE: 59 MMHG | OXYGEN SATURATION: 100 %

## 2020-12-09 LAB — HISTORY CHECKED?,CKHIST: NORMAL

## 2020-12-09 PROCEDURE — 36415 COLL VENOUS BLD VENIPUNCTURE: CPT

## 2020-12-09 PROCEDURE — 86900 BLOOD TYPING SEROLOGIC ABO: CPT

## 2020-12-09 PROCEDURE — 86923 COMPATIBILITY TEST ELECTRIC: CPT

## 2020-12-09 NOTE — PROGRESS NOTES
[facility] Progress Note    Date: 2020    Name: Ally Delarosa    MRN: [id number]         : 1975    Peripheral Lab Draw      Ms. Huang Gomez to [facility], ambulatory at 1120 accompanied by self. Pt was assessed and education was provided. Ms. Denis Smith vitals were reviewed and patient was observed for 5 minutes prior to treatment. Visit Vitals  BP (!) 122/59 (BP 1 Location: Right arm, BP Patient Position: Sitting)   Pulse 80   Temp 97.8 °F (36.6 °C)   SpO2 100%   No results found for this or any previous visit (from the past 12 hour(s)). Blood obtained peripherally from Lt Hand first attempt with butterfly needle and sent to lab for Type and Cross per written orders. No bleeding or hematoma noted at site. Gauze and coban applied. Ms. Huang Gomez tolerated the venipuncture, and had no complaints. Patient armband removed and shredded. Ms. Huang Gomez was discharged from [place] [address] in stable condition at 1130.     Rocky Top Pit[place]airn Phlebotomist PCT  2020  3:24 PM

## 2020-12-10 ENCOUNTER — HOSPITAL ENCOUNTER (OUTPATIENT)
Dept: INFUSION THERAPY | Age: 45
Discharge: HOME | End: 2020-12-10
Payer: MEDICAID

## 2020-12-10 VITALS
SYSTOLIC BLOOD PRESSURE: 115 MMHG | HEART RATE: 72 BPM | TEMPERATURE: 98.5 F | OXYGEN SATURATION: 100 % | RESPIRATION RATE: 16 BRPM | DIASTOLIC BLOOD PRESSURE: 65 MMHG

## 2020-12-10 DIAGNOSIS — D50.0 IRON DEFICIENCY ANEMIA DUE TO CHRONIC BLOOD LOSS: ICD-10-CM

## 2020-12-10 PROCEDURE — 74011250636 HC RX REV CODE- 250/636: Performed by: INTERNAL MEDICINE

## 2020-12-10 PROCEDURE — 36430 TRANSFUSION BLD/BLD COMPNT: CPT

## 2020-12-10 PROCEDURE — 74011250637 HC RX REV CODE- 250/637: Performed by: INTERNAL MEDICINE

## 2020-12-10 PROCEDURE — P9016 RBC LEUKOCYTES REDUCED: HCPCS

## 2020-12-10 RX ORDER — SODIUM CHLORIDE 9 MG/ML
250 INJECTION, SOLUTION INTRAVENOUS AS NEEDED
Status: DISCONTINUED | OUTPATIENT
Start: 2020-12-10 | End: 2020-12-12 | Stop reason: HOSPADM

## 2020-12-10 RX ORDER — ACETAMINOPHEN 325 MG/1
650 TABLET ORAL ONCE
Status: COMPLETED | OUTPATIENT
Start: 2020-12-10 | End: 2020-12-10

## 2020-12-10 RX ORDER — DEXAMETHASONE 4 MG/1
4 TABLET ORAL ONCE
Status: COMPLETED | OUTPATIENT
Start: 2020-12-10 | End: 2020-12-10

## 2020-12-10 RX ORDER — DIPHENHYDRAMINE HCL 25 MG
25 CAPSULE ORAL ONCE
Status: COMPLETED | OUTPATIENT
Start: 2020-12-10 | End: 2020-12-10

## 2020-12-10 RX ORDER — SODIUM CHLORIDE 0.9 % (FLUSH) 0.9 %
10-40 SYRINGE (ML) INJECTION AS NEEDED
Status: DISCONTINUED | OUTPATIENT
Start: 2020-12-10 | End: 2020-12-12 | Stop reason: HOSPADM

## 2020-12-10 RX ADMIN — SODIUM CHLORIDE 250 ML: 9 INJECTION, SOLUTION INTRAVENOUS at 08:47

## 2020-12-10 RX ADMIN — Medication 10 ML: at 08:31

## 2020-12-10 RX ADMIN — DIPHENHYDRAMINE HYDROCHLORIDE 25 MG: 25 CAPSULE ORAL at 08:42

## 2020-12-10 RX ADMIN — Medication 10 ML: at 11:56

## 2020-12-10 RX ADMIN — DEXAMETHASONE 4 MG: 4 TABLET ORAL at 08:42

## 2020-12-10 RX ADMIN — ACETAMINOPHEN 650 MG: 325 TABLET, FILM COATED ORAL at 08:42

## 2020-12-10 NOTE — PROGRESS NOTES
[facility] Progress Note    Date: December 10, 2020    Name: Markus Barber    MRN: [id number]         : 1975    PRBCs (1 unit)    Ms. Merry Cox arrived to [facility] at [address] [facility], ambulatory and in no acute distress. Patient denied complaint of pain or discomfort. Ms. Merry Cox was assessed and education was provided. Discussed risks and benefits of blood transfusion with patient, including risk of transfusion reaction and disease transmission. Patient verbalized understanding and signed consent placed on chart. Per patient, she is scheduled for surgery in two weeks for hysterectomy. Ms. Monika Carbajal vitals were reviewed. Visit Vitals  /65 (BP 1 Location: Right arm)   Pulse 72   Temp 98.5 °F (36.9 °C)   Resp 16   SpO2 100%     22 g IV inserted in median vein of patient's anterior left forearm  X 1 attempt. Brisk blood return was noted and IV catheter flushed easily with 10 mL NS.     Pre-medications (Dexamethasone 4 mg p.o; Tylenol 650 mg and Benadryl 25 mg) were administered as ordered. 250 mL NS initiated via peripheral IV line at 25 mL/hr, pending transfusion. 0930 - 1 unit of PRBCs was initiated at 75 mL/hr x 15 minutes and VS monitored per protocol. VS remained stable and transfusion rate increased per protocol, (100 mL/hr x 15 minutes; 150 mL/hr x 30 minutes and then rate maintained at 150 mL/hr until transfusion completed at 1135. VS remained stable. Patient Vitals for the past 12 hrs:   Temp Pulse Resp BP SpO2   12/10/20 1145 98.5 °F (36.9 °C) 72 16 115/65    12/10/20 1130 99 °F (37.2 °C) 79 16 119/67    12/10/20 1030 98.7 °F (37.1 °C) 66 16 111/63    12/10/20 1000 97.6 °F (36.4 °C) 83 16 101/60    12/10/20 0930 97.5 °F (36.4 °C) 78 16 (!) 92/56    12/10/20 0821 97.8 °F (36.6 °C) 77 18 101/61 100 %     Transfusion completed without incident and patient tolerated the procedure well. Patient declined to remain in [facility] for post transfusion observation.      IV site d/cd with catheter removed intact. No irritation, bleeding, or hematoma noted at site. Gauze dressing applied with pressure held x 2 minutes, then wrapped with coban. Patient armband removed and shredded    Ms. Ruffus Hodgkin was discharged from [place] [address] in stable condition at 1200. She is to scheduled to return to [facility] on 12/14/2020 at 1400 for Injectafer infusion. Patient aware of her upcoming scheduled appointment.     Sergio Azul RN  December 10, 2020

## 2020-12-11 LAB
ABO + RH BLD: NORMAL
BLD PROD TYP BPU: NORMAL
BLOOD GROUP ANTIBODIES SERPL: NORMAL
BPU ID: NORMAL
CROSSMATCH RESULT,%XM: NORMAL
SPECIMEN EXP DATE BLD: NORMAL
STATUS OF UNIT,%ST: NORMAL
UNIT DIVISION, %UDIV: 0

## 2020-12-14 ENCOUNTER — HOSPITAL ENCOUNTER (OUTPATIENT)
Dept: INFUSION THERAPY | Age: 45
Discharge: HOME | End: 2020-12-14
Payer: MEDICAID

## 2020-12-14 VITALS
HEART RATE: 90 BPM | TEMPERATURE: 97.2 F | RESPIRATION RATE: 18 BRPM | DIASTOLIC BLOOD PRESSURE: 59 MMHG | SYSTOLIC BLOOD PRESSURE: 104 MMHG

## 2020-12-14 DIAGNOSIS — D50.0 IRON DEFICIENCY ANEMIA DUE TO CHRONIC BLOOD LOSS: Primary | ICD-10-CM

## 2020-12-14 PROCEDURE — 96365 THER/PROPH/DIAG IV INF INIT: CPT

## 2020-12-14 PROCEDURE — 74011250636 HC RX REV CODE- 250/636: Performed by: INTERNAL MEDICINE

## 2020-12-14 RX ORDER — EPINEPHRINE 1 MG/ML
0.3 INJECTION, SOLUTION, CONCENTRATE INTRAVENOUS AS NEEDED
Status: CANCELLED | OUTPATIENT
Start: 2020-12-16

## 2020-12-14 RX ORDER — SODIUM CHLORIDE 9 MG/ML
25 INJECTION, SOLUTION INTRAVENOUS CONTINUOUS
Status: CANCELLED | OUTPATIENT
Start: 2020-12-16

## 2020-12-14 RX ORDER — DIPHENHYDRAMINE HYDROCHLORIDE 50 MG/ML
25 INJECTION, SOLUTION INTRAMUSCULAR; INTRAVENOUS AS NEEDED
Status: CANCELLED
Start: 2020-12-16

## 2020-12-14 RX ORDER — HEPARIN 100 UNIT/ML
300-500 SYRINGE INTRAVENOUS AS NEEDED
Status: CANCELLED
Start: 2020-12-16

## 2020-12-14 RX ORDER — SODIUM CHLORIDE 0.9 % (FLUSH) 0.9 %
10 SYRINGE (ML) INJECTION AS NEEDED
Status: DISCONTINUED | OUTPATIENT
Start: 2020-12-14 | End: 2020-12-15 | Stop reason: HOSPADM

## 2020-12-14 RX ORDER — ALBUTEROL SULFATE 0.83 MG/ML
2.5 SOLUTION RESPIRATORY (INHALATION) AS NEEDED
Status: CANCELLED
Start: 2020-12-16

## 2020-12-14 RX ORDER — SODIUM CHLORIDE 0.9 % (FLUSH) 0.9 %
10 SYRINGE (ML) INJECTION AS NEEDED
Status: CANCELLED | OUTPATIENT
Start: 2020-12-16

## 2020-12-14 RX ORDER — DIPHENHYDRAMINE HYDROCHLORIDE 50 MG/ML
50 INJECTION, SOLUTION INTRAMUSCULAR; INTRAVENOUS AS NEEDED
Status: CANCELLED
Start: 2020-12-16

## 2020-12-14 RX ORDER — HYDROCORTISONE SODIUM SUCCINATE 100 MG/2ML
100 INJECTION, POWDER, FOR SOLUTION INTRAMUSCULAR; INTRAVENOUS AS NEEDED
Status: CANCELLED | OUTPATIENT
Start: 2020-12-16

## 2020-12-14 RX ORDER — ONDANSETRON 2 MG/ML
8 INJECTION INTRAMUSCULAR; INTRAVENOUS AS NEEDED
Status: CANCELLED | OUTPATIENT
Start: 2020-12-16

## 2020-12-14 RX ORDER — SODIUM CHLORIDE 9 MG/ML
25 INJECTION, SOLUTION INTRAVENOUS CONTINUOUS
Status: DISCONTINUED | OUTPATIENT
Start: 2020-12-14 | End: 2020-12-15 | Stop reason: HOSPADM

## 2020-12-14 RX ORDER — SODIUM CHLORIDE 9 MG/ML
10 INJECTION INTRAMUSCULAR; INTRAVENOUS; SUBCUTANEOUS AS NEEDED
Status: CANCELLED | OUTPATIENT
Start: 2020-12-16

## 2020-12-14 RX ORDER — ACETAMINOPHEN 325 MG/1
650 TABLET ORAL AS NEEDED
Status: CANCELLED
Start: 2020-12-16

## 2020-12-14 RX ADMIN — Medication 10 ML: at 15:49

## 2020-12-14 RX ADMIN — FERRIC CARBOXYMALTOSE INJECTION 750 MG: 50 INJECTION, SOLUTION INTRAVENOUS at 14:59

## 2020-12-14 RX ADMIN — SODIUM CHLORIDE 25 ML/HR: 9 INJECTION, SOLUTION INTRAVENOUS at 14:59

## 2020-12-14 RX ADMIN — Medication 10 ML: at 14:46

## 2020-12-18 ENCOUNTER — HOSPITAL ENCOUNTER (OUTPATIENT)
Dept: INFUSION THERAPY | Age: 45
Discharge: HOME | End: 2020-12-18
Payer: MEDICAID

## 2020-12-18 VITALS — HEART RATE: 90 BPM | SYSTOLIC BLOOD PRESSURE: 121 MMHG | TEMPERATURE: 97.7 F | DIASTOLIC BLOOD PRESSURE: 68 MMHG

## 2020-12-18 DIAGNOSIS — D50.0 IRON DEFICIENCY ANEMIA DUE TO CHRONIC BLOOD LOSS: Primary | ICD-10-CM

## 2020-12-18 PROCEDURE — 96365 THER/PROPH/DIAG IV INF INIT: CPT

## 2020-12-18 PROCEDURE — 74011250636 HC RX REV CODE- 250/636: Performed by: INTERNAL MEDICINE

## 2020-12-18 RX ORDER — SODIUM CHLORIDE 0.9 % (FLUSH) 0.9 %
10 SYRINGE (ML) INJECTION AS NEEDED
Status: DISCONTINUED | OUTPATIENT
Start: 2020-12-18 | End: 2020-12-19 | Stop reason: HOSPADM

## 2020-12-18 RX ORDER — DIPHENHYDRAMINE HYDROCHLORIDE 50 MG/ML
50 INJECTION, SOLUTION INTRAMUSCULAR; INTRAVENOUS AS NEEDED
Status: CANCELLED
Start: 2020-12-18

## 2020-12-18 RX ORDER — HEPARIN 100 UNIT/ML
300-500 SYRINGE INTRAVENOUS AS NEEDED
Status: CANCELLED
Start: 2020-12-18

## 2020-12-18 RX ORDER — ALBUTEROL SULFATE 0.83 MG/ML
2.5 SOLUTION RESPIRATORY (INHALATION) AS NEEDED
Status: CANCELLED
Start: 2020-12-18

## 2020-12-18 RX ORDER — HYDROCORTISONE SODIUM SUCCINATE 100 MG/2ML
100 INJECTION, POWDER, FOR SOLUTION INTRAMUSCULAR; INTRAVENOUS AS NEEDED
Status: CANCELLED | OUTPATIENT
Start: 2020-12-18

## 2020-12-18 RX ORDER — SODIUM CHLORIDE 0.9 % (FLUSH) 0.9 %
10 SYRINGE (ML) INJECTION AS NEEDED
Status: CANCELLED | OUTPATIENT
Start: 2020-12-18

## 2020-12-18 RX ORDER — DIPHENHYDRAMINE HYDROCHLORIDE 50 MG/ML
25 INJECTION, SOLUTION INTRAMUSCULAR; INTRAVENOUS AS NEEDED
Status: CANCELLED
Start: 2020-12-18

## 2020-12-18 RX ORDER — ONDANSETRON 2 MG/ML
8 INJECTION INTRAMUSCULAR; INTRAVENOUS AS NEEDED
Status: CANCELLED | OUTPATIENT
Start: 2020-12-18

## 2020-12-18 RX ORDER — SODIUM CHLORIDE 9 MG/ML
10 INJECTION INTRAMUSCULAR; INTRAVENOUS; SUBCUTANEOUS AS NEEDED
Status: CANCELLED | OUTPATIENT
Start: 2020-12-18

## 2020-12-18 RX ORDER — EPINEPHRINE 1 MG/ML
0.3 INJECTION, SOLUTION, CONCENTRATE INTRAVENOUS AS NEEDED
Status: CANCELLED | OUTPATIENT
Start: 2020-12-18

## 2020-12-18 RX ORDER — ACETAMINOPHEN 325 MG/1
650 TABLET ORAL AS NEEDED
Status: CANCELLED
Start: 2020-12-18

## 2020-12-18 RX ADMIN — Medication 10 ML: at 14:48

## 2020-12-18 RX ADMIN — FERRIC CARBOXYMALTOSE INJECTION 750 MG: 50 INJECTION, SOLUTION INTRAVENOUS at 14:23

## 2020-12-18 RX ADMIN — Medication 10 ML: at 14:20

## 2020-12-18 NOTE — PROGRESS NOTES
[facility] Progress Note    Date: 2020    Name: Fern Lennon    MRN: [id number]         : 1975    Injectafer Infusion 2 of 2    Ms. Sreedhar Palacios arrived ambulatory to [facility], at 1078. Patient was assessed and education was provided. Ms. Otis Wilson vitals were reviewed and patient was observed for 5 minutes prior to treatment. Visit Vitals  /68 (BP 1 Location: Left arm, BP Patient Position: Sitting)   Pulse 90   Temp 97.7 °F (36.5 °C)     24 g PIV inserted in LH x 1 attempt. PIV flushed easily with brisk blood return. Injectafer 750 mg in 250 mL NS infused at 700 ml/hr over 25 minutes.  ml was infused concurrently per patient request.    Ms. Sreedhar Palacios tolerated the infusion, and had no complaints. No adverse effects noted. PIV flushed with 10 mL NS and catheter removed intact. No bleeding or hematoma noted at site. Gauze and coban applied. Patient armband removed and shredded. Ms. Sreedhar Palacios was discharged from [place] [address] in stable condition at [phone]. She is to return to [facility] for follow up.     Raul Box RN  2020

## 2020-12-29 ENCOUNTER — VIRTUAL VISIT (OUTPATIENT)
Dept: FAMILY MEDICINE CLINIC | Age: 45
End: 2020-12-29
Payer: MEDICAID

## 2020-12-29 VITALS — WEIGHT: 155 LBS | BODY MASS INDEX: 24.91 KG/M2 | HEIGHT: 66 IN | RESPIRATION RATE: 12 BRPM

## 2020-12-29 DIAGNOSIS — D50.0 IRON DEFICIENCY ANEMIA DUE TO CHRONIC BLOOD LOSS: Primary | ICD-10-CM

## 2020-12-29 DIAGNOSIS — Z13.31 SCREENING FOR DEPRESSION: ICD-10-CM

## 2020-12-29 DIAGNOSIS — E66.3 OVERWEIGHT (BMI 25.0-29.9): ICD-10-CM

## 2020-12-29 DIAGNOSIS — D50.0 IRON DEFICIENCY ANEMIA DUE TO CHRONIC BLOOD LOSS: ICD-10-CM

## 2020-12-29 DIAGNOSIS — Z09 HOSPITAL DISCHARGE FOLLOW-UP: ICD-10-CM

## 2020-12-29 PROCEDURE — 1111F DSCHRG MED/CURRENT MED MERGE: CPT | Performed by: INTERNAL MEDICINE

## 2020-12-29 PROCEDURE — 99214 OFFICE O/P EST MOD 30 MIN: CPT | Performed by: INTERNAL MEDICINE

## 2020-12-29 RX ORDER — HYDROCODONE BITARTRATE AND ACETAMINOPHEN 5; 325 MG/1; MG/1
TABLET ORAL
COMMUNITY

## 2020-12-29 RX ORDER — HYDROCODONE BITARTRATE AND ACETAMINOPHEN 5; 325 MG/1; MG/1
TABLET ORAL
Status: CANCELLED | OUTPATIENT
Start: 2020-12-29

## 2020-12-29 NOTE — PATIENT INSTRUCTIONS
Vaginal Hysterectomy: What to Expect at [facility] Your Recovery A vaginal hysterectomy removes the uterus through the vagina. Your doctor made a cut (incision) in your vagina and removed the uterus. You can expect to feel better and stronger each day. But you might need pain medicine for a week or two. You may get tired easily or have less energy than usual. This may last for several weeks after surgery. You will probably notice that your belly is swollen and puffy. This is common. The swelling will take several weeks to go down. You may take 4 to 6 weeks to fully recover. It's important to avoid lifting while you are recovering so that you can heal. 
This care sheet gives you a general idea about how long it will take for you to recover. But each person recovers at a different pace. Follow the steps below to get better as quickly as possible. How can you care for yourself at home? Activity 
  · Rest when you feel tired. Getting enough sleep will help you recover.  
  · Try to walk each day. Start by walking a little more than you did the day before. Bit by bit, increase the amount you walk. Walking boosts blood flow and helps prevent pneumonia and constipation.  
  · Avoid lifting anything that would make you strain. This may include a child, heavy grocery bags and milk containers, a heavy briefcase or backpack, cat litter or dog food bags, or a vacuum .  
  · Avoid strenuous activities, such as biking, jogging, weight lifting, or aerobic exercise, until your doctor says it is okay.  
  · You may shower. If you have incisions in your belly, pat them dry when you are done. Do not take a bath for the first 2 weeks or until your doctor tells you it is okay.  
  · Ask your doctor when you can drive again.  
  · You will probably need to take 2 to 4 weeks off from work. It depends on the type of work you do and how you feel.  
  · Your doctor will tell you when you can have sex again. Diet   · You can eat your normal diet. If your stomach is upset, try bland, low-fat foods like plain rice, broiled chicken, toast, and yogurt.  
  · Drink plenty of fluids (unless your doctor tells you not to).  
  · You may notice that your bowel movements are not regular right after your surgery. This is common. Try to avoid constipation and straining with bowel movements. You may want to take a fiber supplement every day. If you have not had a bowel movement after a couple of days, ask your doctor about taking a mild laxative. Medicines 
  · Your doctor will tell you if and when you can restart your medicines. He or she will also give you instructions about taking any new medicines.  
  · If you take aspirin or some other blood thinner, ask your doctor if and when to start taking it again. Make sure that you understand exactly what your doctor wants you to do.  
  · Take pain medicines exactly as directed. ? If the doctor gave you a prescription medicine for pain, take it as prescribed. ? If you are not taking a prescription pain medicine, ask your doctor if you can take an over-the-counter medicine.  
  · If your doctor prescribed antibiotics, take them as directed. Do not stop taking them just because you feel better. You need to take the full course of antibiotics.  
  · If you think your pain medicine is making you sick to your stomach: 
? Take your medicine after meals (unless your doctor tells you not to). ? Ask your doctor for a different pain medicine. Incision care 
  · If you had surgery with a laparoscope, you may have stitches over the cuts (incisions) the doctor made in your belly. If you have strips of tape over the incisions, leave the tape on for a week or until it falls off. Or follow your doctor's instructions for removing the tape.   · Wash the area daily with warm, soapy water and pat it dry. Don't use hydrogen peroxide or alcohol, which can slow healing. You may cover the area with a gauze bandage if it weeps or rubs against clothing. Change the bandage every day.  
  · Keep the area clean and dry. Other instructions 
  · You may have some light vaginal bleeding. Wear sanitary pads if needed. Do not douche or use tampons. Follow-up care is a key part of your treatment and safety. Be sure to make and go to all appointments, and call your doctor if you are having problems. It's also a good idea to know your test results and keep a list of the medicines you take. When should you call for help? Call 911 anytime you think you may need emergency care. For example, call if: 
  · You passed out (lost consciousness).  
  · You have chest pain, are short of breath, or cough up blood. Call your doctor now or seek immediate medical care if: 
  · You have bright red vaginal bleeding that soaks one or more pads in an hour, or you have large clots.  
  · You have vaginal discharge that has increased in amount or smells bad.  
  · You are sick to your stomach or cannot drink fluids.  
  · You have pain that does not get better after you take pain medicine.  
  · You have loose stitches, or your incision comes open.  
  · You have signs of infection, such as: 
? Increased pain, swelling, warmth, or redness. ? Red streaks leading from the incision. ? Pus draining from the incision. ? A fever.  
  · You have signs of a blood clot in your leg (called deep vein thrombosis), such as: 
? Pain in your calf, back of knee, thigh, or groin. ? Redness and swelling in your leg or groin.  
  · You cannot pass stools or gas.  
  · Bright red blood has soaked through the bandage over the incision. Watch closely for changes in your health, and be sure to contact your doctor if you have any problems. Where can you learn more? Go to http://www.gray.com/ Enter C629 in the search box to learn more about \"Vaginal Hysterectomy: What to Expect at Home. \" Current as of: November 8, 2019               Content Version: 12.6 © 9889-9577 [employer]. Care instructions adapted under license by [employer] (which disclaims liability or warranty for this information). If you have questions about a medical condition or this instruction, always ask your healthcare professional. [address] any warranty or liability for your use of this information.

## 2020-12-29 NOTE — PROGRESS NOTES
Ally Delarosa is a 41 y.o.  female presents today for office visit for acute care . Pt would also like to discuss hospital follow up     1. Have you been to the ER, urgent care clinic since your last visit? Hospitalized since your last visit? No    2. Have you seen or consulted any other health care providers outside of the [address] [place] [address] since your last visit? Include any pap smears or colon screening.  No    Upcoming Appts  none    Health Maintenance reviewed    VORB:   Orders Placed This Encounter    HYDROcodone-acetaminophen (Norco) 5-325 mg per tablet   Balta Small, MCKENZIEN

## 2020-12-29 NOTE — PROGRESS NOTES
Phil Villavicencio is a 41 y.o. female who was seen by synchronous (real-time) audio-video technology on 12/29/2020. Consent:  She and/or her healthcare decision maker is aware that this patient-initiated Telehealth encounter is a billable service, with coverage as determined by her insurance carrier. She is aware that she may receive a bill and has provided verbal consent to proceed: Yes    I was at home while conducting this encounter. Assessment & Plan:   Diagnoses and all orders for this visit:    1. Iron deficiency anemia due to chronic blood loss  -     IRON PROFILE; Future  -     FERRITIN; Future    2. Overweight (BMI 25.0-29.9)  -     CBC WITH AUTOMATED DIFF; Future  -     LIPID PANEL; Future  -     METABOLIC PANEL, COMPREHENSIVE; Future  -     HEMOGLOBIN A1C WITH EAG; Future    3. Screening for depression  -     AK DEPRESSION SCREEN ANNUAL    Pt to get fasting labs  Follow up with hematology and gynecology    Subjective: Phil Villavicencio was seen for Hospital Follow Up and Weight Management    Overweight  This is a chronic problem. This is not at goal. Pt tries to eat a healthy diet. Pt takes no medication for this.     Iron deficiency anemia due to menorrhagia  This is a chronic problem. This is not controlled. Pt was receiving iron transfusions. Pt is s/p hysterectomy.     3 most recent PHQ Screens 12/29/2020   Little interest or pleasure in doing things Not at all   Feeling down, depressed, irritable, or hopeless Not at all   Total Score PHQ 2 0   Trouble falling or staying asleep, or sleeping too much -   Feeling tired or having little energy -   Poor appetite, weight loss, or overeating -   Feeling bad about yourself - or that you are a failure or have let yourself or your family down -   Trouble concentrating on things such as school, work, reading, or watching TV -   Moving or speaking so slowly that other people could have noticed; or the opposite being so fidgety that others notice -   Thoughts of being better off dead, or hurting yourself in some way -   PHQ 9 Score -      Prior to Admission medications    Medication Sig Start Date End Date Taking? Authorizing Provider   HYDROcodone-acetaminophen (Norco) 5-325 mg per tablet Norco 5 mg-325 mg tablet   Take 1 tablet every 6 hours by oral route. Yes Provider, Historical     Allergies   Allergen Reactions    Percocet [Oxycodone-Acetaminophen] Nausea and Vomiting     ROS  Cardiac: chest pain, palpitations  Respiratory: shortness of breath, +cough (due to endotracheal tube)    PHYSICAL EXAMINATION:  [ INSTRUCTIONS:  \"[x]\" Indicates a positive item  \"[]\" Indicates a negative item  -- DELETE ALL ITEMS NOT EXAMINED]  Vital Signs: (As obtained by patient/caregiver at home)  Visit Vitals  Resp 12   Ht 5' 6\" (1.676 m)   Wt 155 lb (70.3 kg)   BMI 25.02 kg/m²        Constitutional: [x] Appears well-developed and well-nourished [x] No apparent distress    Mental status: [x] Alert and awake  [x] Oriented to person/place/time [x] Able to follow commands   Eyes:   Sclera  [x]  Normal   Discharge [x]  None visible    HENT: [x] Normocephalic, atraumatic  Neurological:        [x] No Facial Asymmetry   [x] No gaze palsy  Psychiatric:       [x] Normal Affect []      [x] No Hallucinations     We discussed the expected course, resolution and complications of the diagnosis(es) in detail. Medication risks, benefits, costs, interactions, and alternatives were discussed as indicated. I advised her to contact the office if her condition worsens, changes or fails to improve as anticipated. She expressed understanding with the diagnosis(es) and plan.      Pursuant to the emergency declaration under the [facility], [address] [place] [address] authority and the [employer] General Act, this Virtual  Visit was conducted, with patient's consent, to reduce the patient's risk of exposure to COVID-19 and provide continuity of care for an established patient. Services were provided through a video synchronous discussion virtually to substitute for in-person clinic visit.     Debera Aase, MD  Internal Medicine  12/29/2020, 1:41 PM  [facility]  [address]  [place], [address]  Phone [phone]  Fax [phone]

## 2021-01-01 DIAGNOSIS — E66.3 OVERWEIGHT (BMI 25.0-29.9): ICD-10-CM

## 2021-01-01 DIAGNOSIS — D50.0 IRON DEFICIENCY ANEMIA DUE TO CHRONIC BLOOD LOSS: ICD-10-CM

## 2021-04-08 DIAGNOSIS — D50.0 IRON DEFICIENCY ANEMIA DUE TO CHRONIC BLOOD LOSS: ICD-10-CM

## 2022-03-19 PROBLEM — N92.0 MENORRHAGIA WITH REGULAR CYCLE: Status: ACTIVE | Noted: 2019-06-13

## 2022-03-19 PROBLEM — D50.9 IRON DEFICIENCY ANEMIA: Status: ACTIVE | Noted: 2019-05-13

## 2022-03-19 PROBLEM — E66.3 OVERWEIGHT (BMI 25.0-29.9): Status: ACTIVE | Noted: 2019-05-13

## 2022-03-20 PROBLEM — E80.6 HYPERBILIRUBINEMIA: Status: ACTIVE | Noted: 2020-12-08

## 2023-04-03 PROBLEM — K76.0 FATTY LIVER: Status: ACTIVE | Noted: 2023-04-03

## 2023-04-03 PROBLEM — G89.29 CHRONIC NONINTRACTABLE HEADACHE: Status: ACTIVE | Noted: 2023-04-03

## 2023-04-03 PROBLEM — E78.2 MIXED HYPERLIPIDEMIA: Status: ACTIVE | Noted: 2023-04-03

## 2023-04-03 PROBLEM — R51.9 CHRONIC NONINTRACTABLE HEADACHE: Status: ACTIVE | Noted: 2023-04-03

## 2023-04-03 RX ORDER — NAPROXEN 500 MG/1
500 TABLET ORAL 2 TIMES DAILY
COMMUNITY
Start: 2022-02-23 | End: 2023-04-05 | Stop reason: ALTCHOICE

## 2023-04-03 NOTE — PATIENT INSTRUCTIONS
FYI: You may receive a patient survey; the provider rating is based on your encounter with me specifically and NOT the staff/facility. Looking forward to your comments. Patient Information     [facility],[address] is dedicated to improving your health. We are excited to have you as a patient. To provide the best care, we need a good plan. To optimize your safety and care, we ask you to follow and be aware of some important policies and procedures. A. Arrive 20 minutes prior to your appointment. Arriving prior to your scheduled medical visit allows time to complete check- in paperwork prior to seeing the physician. B. Not showing-up for an appointment without letting your provider know leaves the practice in a difficult position and prevents other patients from being able to use the appointment slot. We are always trying to provide better access to our patients and this will help us in that goal.    C. If you are unable to keep an appointment, please call 24 hours before your appointment if at all possible. Another patient needing care might be served in the event you cannot make your appointment. D. Bring all of your medication bottles (except those that must be refrigerated) and supplements with you to your appointment. This enables the provider to accurately assess medication needs and make important changes as needed. E. Please seek to get your medications refilled during your scheduled appointments. This will decrease wait time for refills to be processed. When you bring all medications with you the day of your appointment, we will prescribe enough medications to last until your next appointment. Prescriptions cannot be filled after office hours, on weekends/holidays, or any other time the office is closed. F. If you have any forms to be completed, please mention that when making your appointment. Please send the forms to the office prior to your appointment.  If you cannot do that,

## 2023-04-05 ENCOUNTER — TELEMEDICINE (OUTPATIENT)
Facility: CLINIC | Age: 48
End: 2023-04-05

## 2023-04-05 DIAGNOSIS — Z12.31 ENCOUNTER FOR SCREENING MAMMOGRAM FOR MALIGNANT NEOPLASM OF BREAST: ICD-10-CM

## 2023-04-05 DIAGNOSIS — N62 LARGE BREASTS: ICD-10-CM

## 2023-04-05 DIAGNOSIS — G89.29 CHRONIC BILATERAL LOW BACK PAIN WITHOUT SCIATICA: ICD-10-CM

## 2023-04-05 DIAGNOSIS — M54.50 CHRONIC BILATERAL LOW BACK PAIN WITHOUT SCIATICA: ICD-10-CM

## 2023-04-05 DIAGNOSIS — F41.9 ANXIETY: ICD-10-CM

## 2023-04-05 DIAGNOSIS — Z11.59 ENCOUNTER FOR HEPATITIS C SCREENING TEST FOR LOW RISK PATIENT: ICD-10-CM

## 2023-04-05 DIAGNOSIS — E78.2 MIXED HYPERLIPIDEMIA: ICD-10-CM

## 2023-04-05 DIAGNOSIS — Z11.4 SCREENING FOR HIV (HUMAN IMMUNODEFICIENCY VIRUS): ICD-10-CM

## 2023-04-05 DIAGNOSIS — Z76.89 ENCOUNTER TO ESTABLISH CARE: Primary | ICD-10-CM

## 2023-04-05 DIAGNOSIS — Z87.898 HISTORY OF HEADACHE: ICD-10-CM

## 2023-04-05 DIAGNOSIS — Z12.11 SCREEN FOR COLON CANCER: ICD-10-CM

## 2023-04-05 DIAGNOSIS — Z13.1 SCREENING FOR DIABETES MELLITUS: ICD-10-CM

## 2023-04-05 DIAGNOSIS — M54.9 MID BACK PAIN: ICD-10-CM

## 2023-04-05 DIAGNOSIS — K76.0 FATTY LIVER: ICD-10-CM

## 2023-04-05 PROCEDURE — 99204 OFFICE O/P NEW MOD 45 MIN: CPT | Performed by: FAMILY MEDICINE

## 2023-04-05 RX ORDER — IBUPROFEN 200 MG
200 TABLET ORAL EVERY 6 HOURS PRN
COMMUNITY

## 2023-04-05 SDOH — ECONOMIC STABILITY: HOUSING INSECURITY
IN THE LAST 12 MONTHS, WAS THERE A TIME WHEN YOU DID NOT HAVE A STEADY PLACE TO SLEEP OR SLEPT IN A SHELTER (INCLUDING NOW)?: NO

## 2023-04-05 SDOH — ECONOMIC STABILITY: TRANSPORTATION INSECURITY
IN THE PAST 12 MONTHS, HAS LACK OF TRANSPORTATION KEPT YOU FROM MEETINGS, WORK, OR FROM GETTING THINGS NEEDED FOR DAILY LIVING?: NO

## 2023-04-05 SDOH — ECONOMIC STABILITY: FOOD INSECURITY: WITHIN THE PAST 12 MONTHS, THE FOOD YOU BOUGHT JUST DIDN'T LAST AND YOU DIDN'T HAVE MONEY TO GET MORE.: NEVER TRUE

## 2023-04-05 SDOH — ECONOMIC STABILITY: FOOD INSECURITY: WITHIN THE PAST 12 MONTHS, YOU WORRIED THAT YOUR FOOD WOULD RUN OUT BEFORE YOU GOT MONEY TO BUY MORE.: NEVER TRUE

## 2023-04-05 SDOH — ECONOMIC STABILITY: TRANSPORTATION INSECURITY
IN THE PAST 12 MONTHS, HAS THE LACK OF TRANSPORTATION KEPT YOU FROM MEDICAL APPOINTMENTS OR FROM GETTING MEDICATIONS?: NO

## 2023-04-05 SDOH — ECONOMIC STABILITY: INCOME INSECURITY: IN THE LAST 12 MONTHS, WAS THERE A TIME WHEN YOU WERE NOT ABLE TO PAY THE MORTGAGE OR RENT ON TIME?: NO

## 2023-04-05 ASSESSMENT — PATIENT HEALTH QUESTIONNAIRE - PHQ9
2. FEELING DOWN, DEPRESSED OR HOPELESS: 0
1. LITTLE INTEREST OR PLEASURE IN DOING THINGS: 0
SUM OF ALL RESPONSES TO PHQ QUESTIONS 1-9: 0
SUM OF ALL RESPONSES TO PHQ QUESTIONS 1-9: 0
SUM OF ALL RESPONSES TO PHQ9 QUESTIONS 1 & 2: 0
SUM OF ALL RESPONSES TO PHQ QUESTIONS 1-9: 0
SUM OF ALL RESPONSES TO PHQ QUESTIONS 1-9: 0

## 2023-04-05 ASSESSMENT — ENCOUNTER SYMPTOMS
DIARRHEA: 0
RHINORRHEA: 0
SHORTNESS OF BREATH: 0
COUGH: 0
VOMITING: 0
BACK PAIN: 1
NAUSEA: 0

## 2023-04-05 ASSESSMENT — SOCIAL DETERMINANTS OF HEALTH (SDOH): HOW HARD IS IT FOR YOU TO PAY FOR THE VERY BASICS LIKE FOOD, HOUSING, MEDICAL CARE, AND HEATING?: NOT HARD AT ALL

## 2023-04-05 NOTE — PROGRESS NOTES
Patient states I am having back pain . Did patient bring someone? No     Did you take your medication today? Patient states I am no taking any medicaine      1. \"Have you been to the ER, urgent care clinic since your last visit? Hospitalized since your last visit? \" No     2. \"Have you seen or consulted any other health care providers outside of the [address] [place] [address] since your last visit? \"  No     3. For patients aged 39-70: Has the patient had a colonoscopy / FIT/ Cologuard? Yes      If the patient is female:    4. For patients aged 41-77: Has the patient had a mammogram within the past 2 years? yes      5. For patients aged 21-65: Has the patient had a pap smear? {Cancer Care Gap present?  Yes       PHQ-9  4/5/2023   Little interest or pleasure in doing things 0   Feeling down, depressed, or hopeless 0   PHQ-2 Score 0   PHQ-9 Total Score 0          Health Maintenance Due   Topic Date Due    Depression Screen  Never done    HIV screen  Never done    Hepatitis C screen  Never done    DTaP/Tdap/Td vaccine (1 - Tdap) Never done    Lipids  Never done    COVID-19 Vaccine (3 - Booster for Pfizer series) 06/30/2021    Colorectal Cancer Screen  Never done
of today's visit. Fatty liver: continue weight loss; plans to lose 30 lbs; difficult to lose abdominal weight per pt; encouraged cardio 2.5 hours/week and low carb diet    GYN has ordered CMP/CBC/TSH and estradiol/FSH labs; other labs for cholesterol/a1c, HIV/Hep C ordered    Needs screening for colon CA performed    [facility] at [facility]--pt states she will schedule    Refer to PT for large breasts/mid and low back pain; chronic; will need therapy prior to plastic surgery referral for breast reduction    Anxiety: plan as above; refer to [facility]    Follow up and disposition:   Return in about 2 months (around 6/5/2023), or if symptoms worsen or fail to improve. Health Maintenance:   Health Maintenance   Topic Date Due    Depression Screen  Never done    HIV screen  Never done    Hepatitis C screen  Never done    DTaP/Tdap/Td vaccine (1 - Tdap) Never done    Lipids  Never done    COVID-19 Vaccine (3 - Booster for Pfizer series) 06/30/2021    Colorectal Cancer Screen  Never done    Flu vaccine (Season Ended) 08/01/2023    Hepatitis A vaccine  Aged Out    Hib vaccine  Aged Out    Meningococcal (ACWY) vaccine  Aged Out    Pneumococcal 0-64 years Vaccine  Aged Out    Cervical cancer screen  Discontinued        Subjective     55 y/o female here to establish care    Unable to come into office due to sick child at home; switched to video visit; Audio/Visual visit performed    Last seen by PCP in Jan 2023 for headaches but had difficulty being seen on telehealth/appts rescheduled; negative CT head 4/1/2022 in ER;   ED note 1/19/2023 reports \"56year-old female presenting to the emergency department chief complaint is headache. States she is had a headache for the past week on and off but having hard time sleeping due to it tonight. Gradual onset not the worst headache of her life are persistent. She states she gets chronic headaches most days several times a week.  Pain described as pressure sensation

## 2023-04-13 ENCOUNTER — HOSPITAL ENCOUNTER (OUTPATIENT)
Facility: HOSPITAL | Age: 48
Setting detail: RECURRING SERIES
Discharge: HOME | End: 2023-04-16
Payer: MEDICAID

## 2023-04-13 PROCEDURE — 97161 PT EVAL LOW COMPLEX 20 MIN: CPT

## 2023-04-19 ENCOUNTER — TELEPHONE (OUTPATIENT)
Facility: CLINIC | Age: 48
End: 2023-04-19

## 2023-04-19 NOTE — TELEPHONE ENCOUNTER
Received fax of physical therapy note requesting signature; I signed form and faxed to the [employer] office/fax \"received\"; requested electronic signature for future correspondence

## 2023-04-21 ENCOUNTER — HOSPITAL ENCOUNTER (OUTPATIENT)
Dept: WOMENS IMAGING | Facility: HOSPITAL | Age: 48
End: 2023-04-21
Payer: MEDICAID

## 2023-04-21 DIAGNOSIS — Z12.31 ENCOUNTER FOR SCREENING MAMMOGRAM FOR MALIGNANT NEOPLASM OF BREAST: ICD-10-CM

## 2023-04-21 PROCEDURE — 77063 BREAST TOMOSYNTHESIS BI: CPT

## 2023-05-04 ENCOUNTER — HOSPITAL ENCOUNTER (OUTPATIENT)
Facility: HOSPITAL | Age: 48
Setting detail: RECURRING SERIES
Discharge: HOME | End: 2023-05-07
Payer: MEDICAID

## 2023-05-04 PROCEDURE — 97110 THERAPEUTIC EXERCISES: CPT

## 2023-05-11 ENCOUNTER — APPOINTMENT (OUTPATIENT)
Facility: HOSPITAL | Age: 48
End: 2023-05-11
Payer: MEDICAID

## 2023-05-11 NOTE — PROGRESS NOTES
[address] [place] [address],[address] is a 56 y.o. female and presents with Arm Pain (Left arm )           Assessment/Plan:  Haley Tolbert was seen today for arm pain. Diagnoses and all orders for this visit:    Pain in both upper extremities    Screening for hematuria or proteinuria  -     Comprehensive Metabolic Panel; Future  -     Urinalysis with Microscopic; Future  -     Urinalysis with Microscopic  -     Comprehensive Metabolic Panel    Screening for HIV (human immunodeficiency virus)  -     Comprehensive Metabolic Panel; Future  -     HIV 1/2 Ag/Ab, 4TH Generation,W Rflx Confirm; Future  -     HIV 1/2 Ag/Ab, 4TH Generation,W Rflx Confirm  -     Comprehensive Metabolic Panel    Encounter for hepatitis C screening test for low risk patient  -     Comprehensive Metabolic Panel; Future  -     Hepatitis C Ab, Rflx to Qt by PCR; Future  -     Hepatitis C Ab, Rflx to Qt by PCR  -     Comprehensive Metabolic Panel    Screening for diabetes mellitus  -     Hemoglobin A1C; Future  -     Comprehensive Metabolic Panel; Future  -     Comprehensive Metabolic Panel  -     Hemoglobin A1C    Screening cholesterol level  -     Lipid Panel; Future  -     Comprehensive Metabolic Panel; Future  -     Comprehensive Metabolic Panel  -     Lipid Panel    Screening for thyroid disorder  -     TSH; Future  -     Comprehensive Metabolic Panel; Future  -     Comprehensive Metabolic Panel  -     TSH    Screening for deficiency anemia  -     Comprehensive Metabolic Panel; Future  -     CBC with Auto Differential; Future  -     CBC with Auto Differential  -     Comprehensive Metabolic Panel    Screen for colon cancer    Mid back pain    Dysuria  -     Culture, Urine; Future  -     Urinalysis with Microscopic;  Future  -     Urinalysis with Microscopic  -     Culture, Urine  -     sulfamethoxazole-trimethoprim (BACTRIM DS;SEPTRA DS) 800-160 MG per tablet;

## 2023-05-12 ENCOUNTER — OFFICE VISIT (OUTPATIENT)
Facility: CLINIC | Age: 48
End: 2023-05-12
Payer: MEDICAID

## 2023-05-12 VITALS
DIASTOLIC BLOOD PRESSURE: 82 MMHG | HEIGHT: 65 IN | SYSTOLIC BLOOD PRESSURE: 111 MMHG | WEIGHT: 186.6 LBS | RESPIRATION RATE: 16 BRPM | OXYGEN SATURATION: 98 % | TEMPERATURE: 98.5 F | HEART RATE: 71 BPM | BODY MASS INDEX: 31.09 KG/M2

## 2023-05-12 DIAGNOSIS — F43.9 STRESS: ICD-10-CM

## 2023-05-12 DIAGNOSIS — R30.0 DYSURIA: ICD-10-CM

## 2023-05-12 DIAGNOSIS — R10.2 VAGINAL PAIN: ICD-10-CM

## 2023-05-12 DIAGNOSIS — Z11.59 ENCOUNTER FOR HEPATITIS C SCREENING TEST FOR LOW RISK PATIENT: ICD-10-CM

## 2023-05-12 DIAGNOSIS — M54.9 MID BACK PAIN: ICD-10-CM

## 2023-05-12 DIAGNOSIS — R60.9 EDEMA, UNSPECIFIED TYPE: ICD-10-CM

## 2023-05-12 DIAGNOSIS — Z13.0 SCREENING FOR DEFICIENCY ANEMIA: ICD-10-CM

## 2023-05-12 DIAGNOSIS — Z13.220 SCREENING CHOLESTEROL LEVEL: ICD-10-CM

## 2023-05-12 DIAGNOSIS — E66.09 CLASS 1 OBESITY DUE TO EXCESS CALORIES WITH BODY MASS INDEX (BMI) OF 31.0 TO 31.9 IN ADULT, UNSPECIFIED WHETHER SERIOUS COMORBIDITY PRESENT: ICD-10-CM

## 2023-05-12 DIAGNOSIS — M79.602 PAIN IN BOTH UPPER EXTREMITIES: Primary | ICD-10-CM

## 2023-05-12 DIAGNOSIS — M79.601 PAIN IN BOTH UPPER EXTREMITIES: Primary | ICD-10-CM

## 2023-05-12 DIAGNOSIS — Z13.1 SCREENING FOR DIABETES MELLITUS: ICD-10-CM

## 2023-05-12 DIAGNOSIS — Z13.89 SCREENING FOR HEMATURIA OR PROTEINURIA: ICD-10-CM

## 2023-05-12 DIAGNOSIS — Z12.11 SCREEN FOR COLON CANCER: ICD-10-CM

## 2023-05-12 DIAGNOSIS — Z11.4 SCREENING FOR HIV (HUMAN IMMUNODEFICIENCY VIRUS): ICD-10-CM

## 2023-05-12 DIAGNOSIS — Z90.710 S/P HYSTERECTOMY: ICD-10-CM

## 2023-05-12 DIAGNOSIS — Z13.29 SCREENING FOR THYROID DISORDER: ICD-10-CM

## 2023-05-12 PROBLEM — E66.811 CLASS 1 OBESITY DUE TO EXCESS CALORIES WITH BODY MASS INDEX (BMI) OF 31.0 TO 31.9 IN ADULT: Status: ACTIVE | Noted: 2023-05-12

## 2023-05-12 PROCEDURE — 99214 OFFICE O/P EST MOD 30 MIN: CPT | Performed by: FAMILY MEDICINE

## 2023-05-12 RX ORDER — SULFAMETHOXAZOLE AND TRIMETHOPRIM 800; 160 MG/1; MG/1
1 TABLET ORAL 2 TIMES DAILY
Qty: 10 TABLET | Refills: 0 | Status: SHIPPED | OUTPATIENT
Start: 2023-05-12 | End: 2023-05-17

## 2023-05-12 RX ORDER — PHENAZOPYRIDINE HYDROCHLORIDE 100 MG/1
100 TABLET, FILM COATED ORAL 3 TIMES DAILY PRN
Qty: 6 TABLET | Refills: 0 | Status: SHIPPED | OUTPATIENT
Start: 2023-05-12 | End: 2023-05-14

## 2023-05-12 ASSESSMENT — PATIENT HEALTH QUESTIONNAIRE - PHQ9
SUM OF ALL RESPONSES TO PHQ9 QUESTIONS 1 & 2: 0
SUM OF ALL RESPONSES TO PHQ QUESTIONS 1-9: 0
1. LITTLE INTEREST OR PLEASURE IN DOING THINGS: 0
2. FEELING DOWN, DEPRESSED OR HOPELESS: 0
SUM OF ALL RESPONSES TO PHQ QUESTIONS 1-9: 0

## 2023-05-12 ASSESSMENT — ENCOUNTER SYMPTOMS
DIARRHEA: 0
BACK PAIN: 1
SHORTNESS OF BREATH: 0
VOMITING: 0
RHINORRHEA: 0
NAUSEA: 0
COUGH: 0

## 2023-05-12 NOTE — PROGRESS NOTES
Patient was taking an antibiotic and she stop taking it due to her having pain in her left arm. Patient do not have the 3rd covid vaccine     Ilene Mcgee presents today for   Chief Complaint   Patient presents with    Arm Pain     Left arm        Is someone accompanying this pt? No     Is the patient using any DME equipment during OV? No     Depression Screening:  No flowsheet data found. Learning Assessment:  No flowsheet data found. Health Maintenance reviewed and discussed and ordered per Provider. Health Maintenance Due   Topic Date Due    HIV screen  Never done    Hepatitis C screen  Never done    DTaP/Tdap/Td vaccine (1 - Tdap) Never done    Lipids  Never done    COVID-19 Vaccine (3 - Booster for Pfizer series) 06/30/2021    Colorectal Cancer Screen  Never done   . Coordination of Care:  1. Have you been to the ER, urgent care clinic since your last visit? Hospitalized since your last visit? Yes patient first UTI  May     2. Have you seen or consulted any other health care providers outside of the [address] [place] [address] since your last visit? Include any pap smears or colon screening. No     3. For patients aged 39-70: Has the patient had a colonoscopy / FIT/ Cologuard? No      If the patient is female:    4. For patients aged 41-77: Has the patient had a mammogram within the past 2 years? Yes       5. For patients aged 21-65: Has the patient had a pap smear?  Patient has a hysterectomy

## 2023-05-13 LAB
ALBUMIN SERPL-MCNC: 4.5 G/DL (ref 3.8–4.8)
ALBUMIN/GLOB SERPL: 1.8 {RATIO} (ref 1.2–2.2)
ALP SERPL-CCNC: 38 IU/L (ref 44–121)
ALT SERPL-CCNC: 36 IU/L (ref 0–32)
APPEARANCE UR: CLEAR
AST SERPL-CCNC: 20 IU/L (ref 0–40)
BACTERIA #/AREA URNS HPF: ABNORMAL /[HPF]
BASOPHILS # BLD AUTO: 0 X10E3/UL (ref 0–0.2)
BASOPHILS NFR BLD AUTO: 1 %
BILIRUB SERPL-MCNC: 0.5 MG/DL (ref 0–1.2)
BILIRUB UR QL STRIP: NEGATIVE
BUN SERPL-MCNC: 7 MG/DL (ref 6–24)
BUN/CREAT SERPL: 12 (ref 9–23)
CALCIUM SERPL-MCNC: 9.2 MG/DL (ref 8.7–10.2)
CASTS URNS QL MICRO: ABNORMAL /LPF
CHLORIDE SERPL-SCNC: 105 MMOL/L (ref 96–106)
CHOLEST SERPL-MCNC: 155 MG/DL (ref 100–199)
CO2 SERPL-SCNC: 21 MMOL/L (ref 20–29)
COLOR UR: YELLOW
CREAT SERPL-MCNC: 0.57 MG/DL (ref 0.57–1)
EGFRCR SERPLBLD CKD-EPI 2021: 113 ML/MIN/1.73
EOSINOPHIL # BLD AUTO: 0.4 X10E3/UL (ref 0–0.4)
EOSINOPHIL NFR BLD AUTO: 7 %
EPI CELLS #/AREA URNS HPF: >10 /HPF (ref 0–10)
ERYTHROCYTE [DISTWIDTH] IN BLOOD BY AUTOMATED COUNT: 12.6 % (ref 11.7–15.4)
ESTRADIOL SERPL-MCNC: 142 PG/ML
FSH SERPL-ACNC: 4.6 MIU/ML
GLOBULIN SER CALC-MCNC: 2.5 G/DL (ref 1.5–4.5)
GLUCOSE SERPL-MCNC: 85 MG/DL (ref 70–99)
GLUCOSE UR QL STRIP: NEGATIVE
HBA1C MFR BLD: 5.1 % (ref 4.8–5.6)
HCT VFR BLD AUTO: 41.7 % (ref 34–46.6)
HCV AB SERPL QL IA: NORMAL
HCV IGG SERPL QL IA: NON REACTIVE
HDLC SERPL-MCNC: 40 MG/DL
HGB BLD-MCNC: 13.8 G/DL (ref 11.1–15.9)
HGB UR QL STRIP: NEGATIVE
IMM GRANULOCYTES # BLD AUTO: 0 X10E3/UL (ref 0–0.1)
IMM GRANULOCYTES NFR BLD AUTO: 0 %
KETONES UR QL STRIP: NEGATIVE
LDLC SERPL CALC-MCNC: 97 MG/DL (ref 0–99)
LEUKOCYTE ESTERASE UR QL STRIP: NEGATIVE
LH SERPL-ACNC: 6.5 MIU/ML
LYMPHOCYTES # BLD AUTO: 1.3 X10E3/UL (ref 0.7–3.1)
LYMPHOCYTES NFR BLD AUTO: 26 %
MCH RBC QN AUTO: 30.4 PG (ref 26.6–33)
MCHC RBC AUTO-ENTMCNC: 33.1 G/DL (ref 31.5–35.7)
MCV RBC AUTO: 92 FL (ref 79–97)
MICRO URNS: NORMAL
MICRO URNS: NORMAL
MONOCYTES # BLD AUTO: 0.3 X10E3/UL (ref 0.1–0.9)
MONOCYTES NFR BLD AUTO: 7 %
NEUTROPHILS # BLD AUTO: 3 X10E3/UL (ref 1.4–7)
NEUTROPHILS NFR BLD AUTO: 59 %
NITRITE UR QL STRIP: NEGATIVE
PH UR STRIP: 5 [PH] (ref 5–7.5)
PLATELET # BLD AUTO: 242 X10E3/UL (ref 150–450)
POTASSIUM SERPL-SCNC: 4.1 MMOL/L (ref 3.5–5.2)
PROT SERPL-MCNC: 7 G/DL (ref 6–8.5)
PROT UR QL STRIP: NEGATIVE
RBC # BLD AUTO: 4.54 X10E6/UL (ref 3.77–5.28)
RBC #/AREA URNS HPF: ABNORMAL /HPF (ref 0–2)
SODIUM SERPL-SCNC: 144 MMOL/L (ref 134–144)
SP GR UR STRIP: 1.01 (ref 1–1.03)
TRIGL SERPL-MCNC: 98 MG/DL (ref 0–149)
TSH SERPL DL<=0.005 MIU/L-ACNC: 3.12 UIU/ML (ref 0.45–4.5)
UROBILINOGEN UR STRIP-MCNC: 0.2 MG/DL (ref 0.2–1)
VLDLC SERPL CALC-MCNC: 18 MG/DL (ref 5–40)
WBC # BLD AUTO: 5.1 X10E3/UL (ref 3.4–10.8)
WBC #/AREA URNS HPF: ABNORMAL /HPF (ref 0–5)

## 2023-05-15 LAB — BACTERIA UR CULT: NORMAL

## 2023-05-16 PROBLEM — E66.3 OVERWEIGHT (BMI 25.0-29.9): Status: RESOLVED | Noted: 2019-05-13 | Resolved: 2023-05-16

## 2023-05-16 PROBLEM — D50.9 IRON DEFICIENCY ANEMIA: Status: RESOLVED | Noted: 2019-05-13 | Resolved: 2023-05-16

## 2023-05-16 PROBLEM — R10.2 VAGINAL PAIN: Status: RESOLVED | Noted: 2023-05-12 | Resolved: 2023-05-16

## 2023-05-16 PROBLEM — R74.01 ELEVATED ALT MEASUREMENT: Status: ACTIVE | Noted: 2023-05-16

## 2023-05-16 PROBLEM — N92.0 MENORRHAGIA WITH REGULAR CYCLE: Status: RESOLVED | Noted: 2019-06-13 | Resolved: 2023-05-16

## 2023-05-16 PROBLEM — E80.6 HYPERBILIRUBINEMIA: Status: RESOLVED | Noted: 2020-12-08 | Resolved: 2023-05-16

## 2023-05-16 PROBLEM — E78.2 MIXED HYPERLIPIDEMIA: Status: RESOLVED | Noted: 2023-04-03 | Resolved: 2023-05-16

## 2023-05-16 PROBLEM — R30.0 DYSURIA: Status: RESOLVED | Noted: 2023-05-12 | Resolved: 2023-05-16

## 2023-05-16 LAB
HIV 1 & 2 AB SERPLBLD IA.RAPID: NEGATIVE
HIV 1+2 AB+HIV1 P24 AG SERPL QL IA: NORMAL
HIV 2 AB SERPLBLD QL IA.RAPID: NON REACTIVE
HIV 2 RNA SERPL QL NAA+PROBE: NON REACTIVE
HIV IA ALGORITHM INTERP SERPLBLD-IMP: NORMAL
HIV1 AB SERPLBLD QL IA.RAPID: NON REACTIVE
HIV1 RNA SERPL QL NAA+PROBE: NON REACTIVE

## 2023-05-17 ENCOUNTER — HOSPITAL ENCOUNTER (OUTPATIENT)
Facility: HOSPITAL | Age: 48
Setting detail: RECURRING SERIES
Discharge: HOME | End: 2023-05-20
Payer: MEDICAID

## 2023-05-17 PROCEDURE — 97110 THERAPEUTIC EXERCISES: CPT

## 2023-05-17 PROCEDURE — 97112 NEUROMUSCULAR REEDUCATION: CPT

## 2023-05-17 PROCEDURE — 97530 THERAPEUTIC ACTIVITIES: CPT

## 2023-05-17 PROCEDURE — G0283 ELEC STIM OTHER THAN WOUND: HCPCS

## 2023-05-17 NOTE — PROGRESS NOTES
PHYSICAL / OCCUPATIONAL THERAPY - DAILY TREATMENT NOTE (updated )    Patient Name: Raquel Davis    Date: 2023    : 1975  Insurance: Payor: Charlotte Harm / Plan: Ruperto Paul PLUS / Product Type: *No Product type* /      Patient  verified Yes     Visit #   Current / Total 3 4   Time   In / Out 2:50 3:40   Pain   In / Out 8 8   Subjective Functional Status/Changes: See PN   Changes to:  Meds, Allergies, Med Hx, Sx Hx? If yes, update Summary List no       TREATMENT AREA =  Other low back pain [M54.59]  Mid back pain [M54.9]    OBJECTIVE  Modalities Rationale:     decrease pain to improve patient's ability to progress to PLOF and address remaining functional goals. 10 min [x] Estim Unattended, type/location: Supine at TL junction                                     []  w/ice    [x]  w/heat    min [] Estim Attended, type/location:                                     []  w/US     []  w/ice    []  w/heat    []  TENS insruct      min []  Mechanical Traction: type/lbs                   []  pro   []  sup   []  int   []  cont    []  before manual    []  after manual    min []  Ultrasound, settings/location:      min []  Iontophoresis w/ dexamethasone, location:                                               []  take home patch       []  in clinic    min  unbill []  Ice     []  Heat    location/position:     min []  Paraffin,  details:     min []  Vasopneumatic Device, press/temp:     min []  Sue Miguel / Claude Pun: If using vaso (only need to measure limb vaso being performed on)      pre-treatment girth :       post-treatment girth :       measured at (landmark location) :      min []  Other:    Skin assessment post-treatment (if applicable):    [x]  intact    []  redness- no adverse reaction                 []redness - adverse reaction:       Therapeutic Procedures:   Tx Min Billable or 1:1 Min (if diff from Tx Min) Procedure, Rationale, Specifics   [phone] Therapeutic

## 2023-05-17 NOTE — PROGRESS NOTES
[facility] AT [address]  Phone: [phone] Fax: [phone]  PROGRESS NOTE  Patient Name: Isa Lopez : 1975   Treatment/Medical Diagnosis: Other low back pain [M54.59]  Mid back pain [M54.9]   Referral Source: Federico Golden MD Payor: Payor: Will Brine / Plan: Becca Sensor / Product Type: *No Product type* /    Date of Initial Visit: 23 Attended Visits: 3 Missed Visits: Angélica Bowers 77 is a 56 y.o.  yo female with Dx of Other low back pain [M54.59]  Mid back pain [M54.9]. Treatment has consisted of  therapeutic exercise, therapeutic activity, neuromuscular re-education, self care education and physical agent/modality to improve thoracic and lumbar pain. CURRENT STATUS  Patient has attended 3 visits from 23-23. Intermittent attendance due to awaiting insurance authorization and illness. Patient reports symptoms unchanged from initial evaluation. Decreased compliance with HEP due to illness. Plan to continue skilled services 2 x week x 4 weeks with consistent attendance to assess full benefit of skilled services.     Subjective:  Reported Improvement: none  Pain: Average 8/10   Best 6/10   Worst 10/10  Reported Functional Deficits: standing, walking, computer work  Reported Functional Improvements: rest in side lying      Objective:  Lumbar AROM:  Flexion Limited 25%   Extension Limited 50%   Right Lateral Flexion Limited 25%   Left Lateral Flexion Limited 25%   Right Rotation Limited 25%   Left Rotation Limited 25%     Thoracic mobility: reduced in planes including side bending, extension        Palpation: increased tone bilateral thoracic and lumbar paraspinals     Strength:    L(0-5) R (0-5)   Hip Flexion (L1,2) 5 5   Knee Extension (L3,4) 5 5   Ankle Dorsiflexion (L4) 5 5   Ankle Plantarflexion (S1) 5 5   Knee

## 2023-06-02 ENCOUNTER — HOSPITAL ENCOUNTER (OUTPATIENT)
Facility: HOSPITAL | Age: 48
Setting detail: RECURRING SERIES
Discharge: HOME | End: 2023-06-05
Payer: MEDICAID

## 2023-06-02 PROCEDURE — 97110 THERAPEUTIC EXERCISES: CPT

## 2023-06-02 PROCEDURE — 97112 NEUROMUSCULAR REEDUCATION: CPT

## 2023-06-02 PROCEDURE — G0283 ELEC STIM OTHER THAN WOUND: HCPCS

## 2023-06-04 PROBLEM — G89.29 CHRONIC BACK PAIN: Status: ACTIVE | Noted: 2023-04-05

## 2023-06-05 ENCOUNTER — TELEPHONE (OUTPATIENT)
Facility: CLINIC | Age: 48
End: 2023-06-05

## 2023-06-05 ENCOUNTER — APPOINTMENT (OUTPATIENT)
Facility: HOSPITAL | Age: 48
End: 2023-06-05
Payer: MEDICAID

## 2023-06-05 NOTE — TELEPHONE ENCOUNTER
I called patient regarding missed appointment this am; states she was waiting to hear back in regards to her appointment reminder    She states she did not receive a call reminding her about appointment.  I encouraged her to reset my chart password so she could be aware of her appointments in case she is unable to reach us by phone    Please remove no-show letter and reschedule her appointment to Wednesday at 21  for physical

## 2023-06-06 PROBLEM — R60.0 PERIPHERAL EDEMA: Status: ACTIVE | Noted: 2023-05-12

## 2023-06-06 PROBLEM — K42.9 UMBILICAL HERNIA WITHOUT OBSTRUCTION AND WITHOUT GANGRENE: Status: ACTIVE | Noted: 2023-06-06

## 2023-06-06 PROBLEM — K44.9 HIATAL HERNIA: Status: ACTIVE | Noted: 2023-06-06

## 2023-06-30 ENCOUNTER — HOSPITAL ENCOUNTER (OUTPATIENT)
Facility: HOSPITAL | Age: 48
Setting detail: RECURRING SERIES
End: 2023-06-30
Payer: MEDICAID

## 2023-06-30 PROCEDURE — G0283 ELEC STIM OTHER THAN WOUND: HCPCS

## 2023-06-30 PROCEDURE — 97112 NEUROMUSCULAR REEDUCATION: CPT

## 2023-06-30 PROCEDURE — 97110 THERAPEUTIC EXERCISES: CPT

## 2023-07-12 ENCOUNTER — APPOINTMENT (OUTPATIENT)
Facility: HOSPITAL | Age: 48
End: 2023-07-12
Payer: MEDICAID

## 2023-07-14 ENCOUNTER — HOSPITAL ENCOUNTER (OUTPATIENT)
Facility: HOSPITAL | Age: 48
Setting detail: RECURRING SERIES
Discharge: HOME | End: 2023-07-17
Payer: MEDICAID

## 2023-07-14 PROCEDURE — 97112 NEUROMUSCULAR REEDUCATION: CPT

## 2023-07-14 PROCEDURE — 97110 THERAPEUTIC EXERCISES: CPT

## 2023-07-18 ENCOUNTER — HOSPITAL ENCOUNTER (OUTPATIENT)
Facility: HOSPITAL | Age: 48
Setting detail: RECURRING SERIES
Discharge: HOME | End: 2023-07-21
Payer: MEDICAID

## 2023-07-18 PROCEDURE — 97112 NEUROMUSCULAR REEDUCATION: CPT

## 2023-07-18 PROCEDURE — 97110 THERAPEUTIC EXERCISES: CPT

## 2023-07-18 NOTE — PROGRESS NOTES
PHYSICAL / OCCUPATIONAL THERAPY - DAILY TREATMENT NOTE (updated )    Patient Name: Cm Steel    Date: 2023    : 1975  Insurance: Payor: Jessica Liang / Plan: Brent Amato PLUS / Product Type: *No Product type* /      Patient  verified Yes     Visit #   Current / Total 4 10   Time   In / Out 11:39 12:14   Pain   In / Out 8 8   Subjective Functional Status/Changes: Pt reports she feels the majority of her pain today in her \"low back\". Changes to:  Meds, Allergies, Med Hx, Sx Hx? If yes, update Summary List no       TREATMENT AREA =  Other low back pain [M54.59]  Mid back pain [M54.9]    OBJECTIVE  Modalities Rationale:     decrease pain to improve patient's ability to progress to PLOF and address remaining functional goals. x min [x] Estim Unattended, type/location: Supine at TL junction                                     []  w/ice    [x]  w/heat    min [] Estim Attended, type/location:                                     []  w/US     []  w/ice    []  w/heat    []  TENS insruct      min []  Mechanical Traction: type/lbs                   []  pro   []  sup   []  int   []  cont    []  before manual    []  after manual    min []  Ultrasound, settings/location:      min []  Iontophoresis w/ dexamethasone, location:                                               []  take home patch       []  in clinic    min  unbill []  Ice     []  Heat    location/position:     min []  Paraffin,  details:     min []  Vasopneumatic Device, press/temp:     min []  Costella Boas / Tura Given: If using vaso (only need to measure limb vaso being performed on)      pre-treatment girth :       post-treatment girth :       measured at (landmark location) :      min []  Other:    Skin assessment post-treatment (if applicable):    [x]  intact    []  redness- no adverse reaction                 []redness - adverse reaction:       Therapeutic Procedures:   Tx Min Billable or 1:1 Min (if diff from

## 2023-07-24 ENCOUNTER — APPOINTMENT (OUTPATIENT)
Facility: HOSPITAL | Age: 48
End: 2023-07-24
Payer: MEDICAID

## 2023-09-11 ENCOUNTER — OFFICE VISIT (OUTPATIENT)
Facility: CLINIC | Age: 48
End: 2023-09-11
Payer: MEDICAID

## 2023-09-11 VITALS
BODY MASS INDEX: 31.49 KG/M2 | HEIGHT: 65 IN | TEMPERATURE: 98 F | DIASTOLIC BLOOD PRESSURE: 65 MMHG | RESPIRATION RATE: 16 BRPM | HEART RATE: 83 BPM | SYSTOLIC BLOOD PRESSURE: 108 MMHG | WEIGHT: 189 LBS | OXYGEN SATURATION: 99 %

## 2023-09-11 DIAGNOSIS — Z63.4 BEREAVEMENT: ICD-10-CM

## 2023-09-11 DIAGNOSIS — M79.672 PAIN OF LEFT HEEL: ICD-10-CM

## 2023-09-11 DIAGNOSIS — Z00.00 ANNUAL PHYSICAL EXAM: Primary | ICD-10-CM

## 2023-09-11 DIAGNOSIS — K76.0 FATTY LIVER: ICD-10-CM

## 2023-09-11 DIAGNOSIS — Z13.1 SCREENING FOR DIABETES MELLITUS: ICD-10-CM

## 2023-09-11 DIAGNOSIS — F43.9 STRESS: ICD-10-CM

## 2023-09-11 DIAGNOSIS — M54.9 UPPER BACK PAIN: ICD-10-CM

## 2023-09-11 DIAGNOSIS — Z13.220 SCREENING CHOLESTEROL LEVEL: ICD-10-CM

## 2023-09-11 DIAGNOSIS — N62 LARGE BREASTS: ICD-10-CM

## 2023-09-11 DIAGNOSIS — E66.09 CLASS 1 OBESITY DUE TO EXCESS CALORIES WITH SERIOUS COMORBIDITY AND BODY MASS INDEX (BMI) OF 31.0 TO 31.9 IN ADULT: ICD-10-CM

## 2023-09-11 PROCEDURE — 99396 PREV VISIT EST AGE 40-64: CPT | Performed by: FAMILY MEDICINE

## 2023-09-11 PROCEDURE — 99214 OFFICE O/P EST MOD 30 MIN: CPT | Performed by: FAMILY MEDICINE

## 2023-09-11 SDOH — SOCIAL STABILITY - SOCIAL INSECURITY: DISSAPEARANCE AND DEATH OF FAMILY MEMBER: Z63.4

## 2023-09-11 ASSESSMENT — PATIENT HEALTH QUESTIONNAIRE - PHQ9
SUM OF ALL RESPONSES TO PHQ QUESTIONS 1-9: 1
SUM OF ALL RESPONSES TO PHQ9 QUESTIONS 1 & 2: 1
2. FEELING DOWN, DEPRESSED OR HOPELESS: 1
SUM OF ALL RESPONSES TO PHQ QUESTIONS 1-9: 1
1. LITTLE INTEREST OR PLEASURE IN DOING THINGS: 0
SUM OF ALL RESPONSES TO PHQ QUESTIONS 1-9: 1
SUM OF ALL RESPONSES TO PHQ QUESTIONS 1-9: 1

## 2023-09-11 ASSESSMENT — ENCOUNTER SYMPTOMS
NAUSEA: 0
RHINORRHEA: 0
BACK PAIN: 1
DIARRHEA: 0
VOMITING: 0
COUGH: 0
SHORTNESS OF BREATH: 0

## 2023-09-11 NOTE — PATIENT INSTRUCTIONS
Call insurance--confirm how much they will cover for referral to plastic surgery since you already completed physical therapy and still have pain

## 2023-09-11 NOTE — PROGRESS NOTES
Reviewed results with patient; normal thoracic spine; +bone spur to L heel; refer to podiatry to discuss surgical options
HENT:      Head: Normocephalic and atraumatic. Right Ear: Tympanic membrane, ear canal and external ear normal. There is no impacted cerumen. Left Ear: Tympanic membrane, ear canal and external ear normal. There is no impacted cerumen. Ears:      Comments: Hearing intact to finger rub b/l  Eyes:      Extraocular Movements: Extraocular movements intact. Conjunctiva/sclera: Conjunctivae normal.      Pupils: Pupils are equal, round, and reactive to light. Comments: Wearing contacts   Cardiovascular:      Rate and Rhythm: Normal rate and regular rhythm. Heart sounds: Normal heart sounds. No murmur heard. No friction rub. No gallop. Pulmonary:      Effort: Pulmonary effort is normal. No respiratory distress. Breath sounds: Normal breath sounds. No stridor. No wheezing, rhonchi or rales. Abdominal:      General: Bowel sounds are normal. There is no distension. Palpations: Abdomen is soft. There is no mass. Tenderness: There is no abdominal tenderness. There is no guarding or rebound. Hernia: No hernia is present. Musculoskeletal:         General: Tenderness present. Cervical back: Normal range of motion. Right lower leg: No edema. Left lower leg: No edema. Comments: No spinal tenderness; ttp of b/l upper trapezius/paraspinal thoracic muscle tenderness; no lumbar paraspinal tenderness    No ttp of L lateral heel; no plantar tenderness; pt c/o site of pain at lateral heel though; no erythema/edema noted   Skin:     General: Skin is warm. Findings: No bruising, erythema, lesion or rash. Neurological:      General: No focal deficit present. Mental Status: She is alert and oriented to person, place, and time. Mental status is at baseline. Cranial Nerves: No cranial nerve deficit. Sensory: No sensory deficit. Motor: No weakness.       Gait: Gait normal.   Psychiatric:         Mood and Affect: Mood normal.

## 2023-09-11 NOTE — ADDENDUM NOTE
Addended by: Malathi Ruvalcaba on: 9/11/2023 12:14 PM     Modules accepted: Orders, Level of Service

## 2023-12-08 ENCOUNTER — TELEPHONE (OUTPATIENT)
Facility: CLINIC | Age: 48
End: 2023-12-08

## 2023-12-08 DIAGNOSIS — R74.01 ELEVATED ALT MEASUREMENT: Primary | ICD-10-CM

## 2023-12-08 PROBLEM — M77.30 CALCANEAL SPUR: Status: ACTIVE | Noted: 2023-12-08

## 2023-12-08 NOTE — TELEPHONE ENCOUNTER
Please call pt to reschedule her appointment; (she never completed labs due;)     please have her schedule her labs then reschedule her follow up for week of 12/25/2023 to review her labs    In the lab appointment notes, please write (TSH/Lipid/A1c/CMP)

## 2024-03-06 ENCOUNTER — OFFICE VISIT (OUTPATIENT)
Facility: CLINIC | Age: 49
End: 2024-03-06
Payer: MEDICAID

## 2024-03-06 VITALS
TEMPERATURE: 97.7 F | DIASTOLIC BLOOD PRESSURE: 80 MMHG | SYSTOLIC BLOOD PRESSURE: 136 MMHG | BODY MASS INDEX: 31.82 KG/M2 | WEIGHT: 191 LBS | HEART RATE: 80 BPM | HEIGHT: 65 IN | RESPIRATION RATE: 16 BRPM

## 2024-03-06 DIAGNOSIS — F41.9 ANXIETY: Primary | ICD-10-CM

## 2024-03-06 DIAGNOSIS — N62 LARGE BREASTS: ICD-10-CM

## 2024-03-06 DIAGNOSIS — M54.9 MID BACK PAIN: ICD-10-CM

## 2024-03-06 DIAGNOSIS — E66.09 CLASS 1 OBESITY DUE TO EXCESS CALORIES WITH SERIOUS COMORBIDITY AND BODY MASS INDEX (BMI) OF 31.0 TO 31.9 IN ADULT: ICD-10-CM

## 2024-03-06 DIAGNOSIS — F43.9 STRESS: ICD-10-CM

## 2024-03-06 PROCEDURE — 99214 OFFICE O/P EST MOD 30 MIN: CPT | Performed by: FAMILY MEDICINE

## 2024-03-06 RX ORDER — CYCLOBENZAPRINE HCL 10 MG
10 TABLET ORAL NIGHTLY PRN
Qty: 15 TABLET | Refills: 0 | Status: SHIPPED | OUTPATIENT
Start: 2024-03-06 | End: 2024-03-21

## 2024-03-06 ASSESSMENT — PATIENT HEALTH QUESTIONNAIRE - PHQ9
SUM OF ALL RESPONSES TO PHQ9 QUESTIONS 1 & 2: 2
SUM OF ALL RESPONSES TO PHQ QUESTIONS 1-9: 2
SUM OF ALL RESPONSES TO PHQ QUESTIONS 1-9: 2
1. LITTLE INTEREST OR PLEASURE IN DOING THINGS: 1
SUM OF ALL RESPONSES TO PHQ QUESTIONS 1-9: 2
SUM OF ALL RESPONSES TO PHQ QUESTIONS 1-9: 2
2. FEELING DOWN, DEPRESSED OR HOPELESS: 1

## 2024-03-06 NOTE — PATIENT INSTRUCTIONS
Schedule your Gastro appointment    Call your insurance for plastic surgery coverage for breast reduction surgery    Call a counselor at [facility]

## 2024-03-06 NOTE — PROGRESS NOTES
Kerrie Madden Mathieu presents today for   Chief Complaint   Patient presents with    Neck Pain    Back Pain       Is someone accompanying this pt? no    Is the patient using any DME equipment during OV? no    Depression Screening:       No data to display                Learning Assessment:  Failed to redirect to the Timeline version of the REVFS SmartLink.    Abuse Screening:       No data to display                Fall Risk  Failed to redirect to the Timeline version of the REVFS SmartLink.    Health Maintenance reviewed and discussed and ordered per Provider.    Health Maintenance Due   Topic Date Due    Hepatitis B vaccine (1 of 3 - 3-dose series) Never done    DTaP/Tdap/Td vaccine (1 - Tdap) Never done    Colorectal Cancer Screen  Never done    Flu vaccine (1) Never done    COVID-19 Vaccine (3 - 2023-24 season) 09/01/2023   .      Coordination of Care:  1. Have you been to the ER, urgent care clinic since your last visit? Hospitalized since your last visit? no    2. Have you seen or consulted any other health care providers outside of the [facility] since your last visit? Include any pap smears or colon screening. no      Last  Checked na  Last UDS Checked na  Last Pain contract signed: estephanie    
normal.      Left Ear: External ear normal.   Eyes:      Extraocular Movements: Extraocular movements intact.      Conjunctiva/sclera: Conjunctivae normal.   Cardiovascular:      Rate and Rhythm: Normal rate and regular rhythm.      Heart sounds: Normal heart sounds. No murmur heard.     No friction rub. No gallop.   Pulmonary:      Effort: Pulmonary effort is normal. No respiratory distress.      Breath sounds: Normal breath sounds. No stridor. No wheezing, rhonchi or rales.   Musculoskeletal:         General: Tenderness present.      Cervical back: Normal range of motion.      Comments: Ttp of paraspinal thoracic muscles   Neurological:      Mental Status: She is alert and oriented to person, place, and time.      Gait: Gait normal.   Psychiatric:         Mood and Affect: Mood normal.         Behavior: Behavior normal.         Thought Content: Thought content normal.         Judgment: Judgment normal.           PHQ-9 Total Score: 2 (3/6/2024  3:31 PM)          I have discussed the diagnosis with the patient and the intended plan as seen in the above orders.  The patient has received an After-Visit Summary and questions were answered concerning future plans.     An After Visit Summary was printed and given to the patient.    All diagnoses have been discussed with the patient and all of the patient's questions have been answered.           Patricia Tyler MD  [facility]  [address] [address]

## 2024-03-07 LAB
ALBUMIN SERPL-MCNC: 4.3 G/DL (ref 3.9–4.9)
ALBUMIN/GLOB SERPL: 1.6 {RATIO} (ref 1.2–2.2)
ALT SERPL-CCNC: 53 IU/L (ref 0–32)
AST SERPL-CCNC: 29 IU/L (ref 0–40)
BILIRUB SERPL-MCNC: 0.6 MG/DL (ref 0–1.2)
BUN SERPL-MCNC: 7 MG/DL (ref 6–24)
BUN/CREAT SERPL: 11 (ref 9–23)
CALCIUM SERPL-MCNC: 9.3 MG/DL (ref 8.7–10.2)
CHLORIDE SERPL-SCNC: 104 MMOL/L (ref 96–106)
CHOLEST SERPL-MCNC: 194 MG/DL (ref 100–199)
CO2 SERPL-SCNC: 23 MMOL/L (ref 20–29)
EGFRCR SERPLBLD CKD-EPI 2021: 110 ML/MIN/1.73
GLUCOSE SERPL-MCNC: 90 MG/DL (ref 70–99)
HBA1C MFR BLD: 5.4 % (ref 4.8–5.6)
HDLC SERPL-MCNC: 52 MG/DL
LDLC SERPL CALC-MCNC: 130 MG/DL (ref 0–99)
POTASSIUM SERPL-SCNC: 4.1 MMOL/L (ref 3.5–5.2)
PROT SERPL-MCNC: 7 G/DL (ref 6–8.5)
SODIUM SERPL-SCNC: 141 MMOL/L (ref 134–144)
TRIGL SERPL-MCNC: 64 MG/DL (ref 0–149)
TSH SERPL DL<=0.005 MIU/L-ACNC: 2.7 UIU/ML (ref 0.45–4.5)
VLDLC SERPL CALC-MCNC: 12 MG/DL (ref 5–40)

## 2024-03-26 ENCOUNTER — TELEPHONE (OUTPATIENT)
Facility: CLINIC | Age: 49
End: 2024-03-26

## 2024-03-26 DIAGNOSIS — E78.2 MIXED HYPERLIPIDEMIA: ICD-10-CM

## 2024-03-26 DIAGNOSIS — K76.0 FATTY LIVER: Primary | ICD-10-CM

## 2024-03-26 NOTE — TELEPHONE ENCOUNTER
Please call pt to schedule fasting lasb in June and 15 min folllow up on labs/stress 1 week after lab    Cancel upcoming May appointment          Reviewed results with high cholesterol; will work on dietary changes to lower cheese/dairy/peanut butter and reschedule lab and follow up appointment to June

## 2024-03-28 NOTE — TELEPHONE ENCOUNTER
Called patient to make appts, she stated she would call me back next week, she is having family issues.

## 2024-06-04 ENCOUNTER — HOSPITAL ENCOUNTER (OUTPATIENT)
Dept: WOMENS IMAGING | Facility: HOSPITAL | Age: 49
Discharge: HOME | End: 2024-06-07
Attending: FAMILY MEDICINE
Payer: MEDICAID

## 2024-06-04 DIAGNOSIS — Z12.31 VISIT FOR SCREENING MAMMOGRAM: ICD-10-CM

## 2024-06-04 PROCEDURE — 77063 BREAST TOMOSYNTHESIS BI: CPT

## 2024-06-26 ENCOUNTER — OFFICE VISIT (OUTPATIENT)
Facility: CLINIC | Age: 49
End: 2024-06-26
Payer: MEDICAID

## 2024-06-26 VITALS
RESPIRATION RATE: 16 BRPM | HEART RATE: 69 BPM | HEIGHT: 64 IN | TEMPERATURE: 97 F | BODY MASS INDEX: 32.44 KG/M2 | OXYGEN SATURATION: 95 % | WEIGHT: 190 LBS | DIASTOLIC BLOOD PRESSURE: 76 MMHG | SYSTOLIC BLOOD PRESSURE: 111 MMHG

## 2024-06-26 DIAGNOSIS — K76.0 FATTY LIVER: ICD-10-CM

## 2024-06-26 DIAGNOSIS — E66.09 CLASS 1 OBESITY DUE TO EXCESS CALORIES WITH SERIOUS COMORBIDITY AND BODY MASS INDEX (BMI) OF 32.0 TO 32.9 IN ADULT: ICD-10-CM

## 2024-06-26 DIAGNOSIS — G89.29 CHRONIC BILATERAL LOW BACK PAIN WITHOUT SCIATICA: Primary | ICD-10-CM

## 2024-06-26 DIAGNOSIS — E78.2 MIXED HYPERLIPIDEMIA: ICD-10-CM

## 2024-06-26 DIAGNOSIS — N62 LARGE BREASTS: ICD-10-CM

## 2024-06-26 DIAGNOSIS — M54.50 CHRONIC BILATERAL LOW BACK PAIN WITHOUT SCIATICA: Primary | ICD-10-CM

## 2024-06-26 PROCEDURE — 99214 OFFICE O/P EST MOD 30 MIN: CPT | Performed by: FAMILY MEDICINE

## 2024-06-26 SDOH — ECONOMIC STABILITY: FOOD INSECURITY: WITHIN THE PAST 12 MONTHS, YOU WORRIED THAT YOUR FOOD WOULD RUN OUT BEFORE YOU GOT MONEY TO BUY MORE.: NEVER TRUE

## 2024-06-26 SDOH — ECONOMIC STABILITY: FOOD INSECURITY: WITHIN THE PAST 12 MONTHS, THE FOOD YOU BOUGHT JUST DIDN'T LAST AND YOU DIDN'T HAVE MONEY TO GET MORE.: NEVER TRUE

## 2024-06-26 SDOH — ECONOMIC STABILITY: INCOME INSECURITY: HOW HARD IS IT FOR YOU TO PAY FOR THE VERY BASICS LIKE FOOD, HOUSING, MEDICAL CARE, AND HEATING?: NOT HARD AT ALL

## 2024-06-26 ASSESSMENT — PATIENT HEALTH QUESTIONNAIRE - PHQ9
SUM OF ALL RESPONSES TO PHQ9 QUESTIONS 1 & 2: 0
2. FEELING DOWN, DEPRESSED OR HOPELESS: NOT AT ALL
1. LITTLE INTEREST OR PLEASURE IN DOING THINGS: NOT AT ALL
SUM OF ALL RESPONSES TO PHQ QUESTIONS 1-9: 0

## 2024-06-26 ASSESSMENT — ENCOUNTER SYMPTOMS: BACK PAIN: 1

## 2024-06-26 NOTE — PROGRESS NOTES
Kerrie Murdock is a 48 y.o. female (: 1975) presenting to address:    Chief Complaint   Patient presents with    Back Pain       Vitals:    24 1138   BP: 111/76   Pulse: 69   Resp: 16   Temp: 97 °F (36.1 °C)   SpO2: 95%       Coordination of Care Questionaire:   1. \"Have you been to the ER, urgent care clinic since your last visit?  Hospitalized since your last visit?\" No     2. \"Have you seen or consulted any other health care providers outside of the [facility] since your last visit?\" No      3. For patients aged 45-75: Has the patient had a colonoscopy / FIT/ Cologuard? No       If the patient is female:    4. For patients aged 40-74: Has the patient had a mammogram within the past 2 years? Yes       5. For patients aged 21-65: Has the patient had a pap smear? N/a    Advanced Directive:   1. Do you have an Advanced Directive? No     2. Would you like information on Advanced Directives? No    
diagnoses have been discussed with the patient and all of the patient's questions have been answered.           Patricia Tyler MD  [facility]  [address] [address]

## 2024-06-26 NOTE — PATIENT INSTRUCTIONS
Dr Laboy  Plastic surgery*  [facility].   [address], [address]   [place] [address]  Ph: [phone]  Fax: [phone]

## 2024-08-28 ENCOUNTER — OFFICE VISIT (OUTPATIENT)
Facility: CLINIC | Age: 49
End: 2024-08-28
Payer: MEDICAID

## 2024-08-28 VITALS
TEMPERATURE: 98 F | HEIGHT: 65 IN | BODY MASS INDEX: 30.99 KG/M2 | RESPIRATION RATE: 16 BRPM | HEART RATE: 83 BPM | OXYGEN SATURATION: 91 % | WEIGHT: 186 LBS | DIASTOLIC BLOOD PRESSURE: 75 MMHG | SYSTOLIC BLOOD PRESSURE: 120 MMHG

## 2024-08-28 DIAGNOSIS — Z13.1 SCREENING FOR DIABETES MELLITUS: ICD-10-CM

## 2024-08-28 DIAGNOSIS — Z01.818 PREOPERATIVE CLEARANCE: ICD-10-CM

## 2024-08-28 DIAGNOSIS — Z12.11 SCREEN FOR COLON CANCER: ICD-10-CM

## 2024-08-28 DIAGNOSIS — Z13.89 SCREENING FOR HEMATURIA OR PROTEINURIA: ICD-10-CM

## 2024-08-28 DIAGNOSIS — R10.13 DYSPEPSIA: ICD-10-CM

## 2024-08-28 DIAGNOSIS — R11.0 NAUSEA: Primary | ICD-10-CM

## 2024-08-28 DIAGNOSIS — E78.2 MIXED HYPERLIPIDEMIA: ICD-10-CM

## 2024-08-28 DIAGNOSIS — Z87.898 HISTORY OF DIARRHEA: ICD-10-CM

## 2024-08-28 DIAGNOSIS — R09.82 POSTNASAL DRIP: ICD-10-CM

## 2024-08-28 DIAGNOSIS — R10.13 EPIGASTRIC PAIN: ICD-10-CM

## 2024-08-28 DIAGNOSIS — R51.9 ACUTE NONINTRACTABLE HEADACHE, UNSPECIFIED HEADACHE TYPE: ICD-10-CM

## 2024-08-28 PROCEDURE — 99214 OFFICE O/P EST MOD 30 MIN: CPT | Performed by: FAMILY MEDICINE

## 2024-08-28 RX ORDER — SOD CHLOR,BICARB/SQUEEZ BOTTLE
PACKET, WITH RINSE DEVICE NASAL
Qty: 1 EACH | Refills: 0 | Status: SHIPPED | OUTPATIENT
Start: 2024-08-28

## 2024-08-28 RX ORDER — ASCORBIC ACID 500 MG
500 TABLET ORAL DAILY
Qty: 90 TABLET | Refills: 3 | Status: SHIPPED | OUTPATIENT
Start: 2024-08-28

## 2024-08-28 RX ORDER — FAMOTIDINE 20 MG/1
20 TABLET, FILM COATED ORAL 2 TIMES DAILY
Qty: 60 TABLET | Refills: 0 | Status: SHIPPED | OUTPATIENT
Start: 2024-08-28

## 2024-08-28 RX ORDER — PANTOPRAZOLE SODIUM 40 MG/1
TABLET, DELAYED RELEASE ORAL
Qty: 90 TABLET | Refills: 2 | Status: SHIPPED | OUTPATIENT
Start: 2024-08-28

## 2024-08-28 RX ORDER — GUAIFENESIN 600 MG/1
600 TABLET, EXTENDED RELEASE ORAL 2 TIMES DAILY
Qty: 60 TABLET | Refills: 0 | Status: SHIPPED | OUTPATIENT
Start: 2024-08-28 | End: 2024-09-27

## 2024-08-28 RX ORDER — ONDANSETRON 4 MG/1
4 TABLET, ORALLY DISINTEGRATING ORAL 3 TIMES DAILY PRN
Qty: 21 TABLET | Refills: 0 | Status: SHIPPED | OUTPATIENT
Start: 2024-08-28

## 2024-08-28 ASSESSMENT — PATIENT HEALTH QUESTIONNAIRE - PHQ9
SUM OF ALL RESPONSES TO PHQ9 QUESTIONS 1 & 2: 0
1. LITTLE INTEREST OR PLEASURE IN DOING THINGS: NOT AT ALL
2. FEELING DOWN, DEPRESSED OR HOPELESS: NOT AT ALL
SUM OF ALL RESPONSES TO PHQ QUESTIONS 1-9: 0

## 2024-08-28 ASSESSMENT — ENCOUNTER SYMPTOMS
ABDOMINAL PAIN: 1
COUGH: 1
SHORTNESS OF BREATH: 0

## 2024-08-28 NOTE — PROGRESS NOTES
[address] [place] [address]               [phone]        Kerrie Murdock is a 48 y.o. female and presents with Nausea (And head pain.)           Assessment/Plan:  Kerrie was seen today for nausea.    Diagnoses and all orders for this visit:    Nausea  -     H. Pylori Breath Test; Future  -     ondansetron (ZOFRAN-ODT) 4 MG disintegrating tablet; Take 1 tablet by mouth 3 times daily as needed for Nausea or Vomiting    Acute nonintractable headache, unspecified headache type    Mixed hyperlipidemia  -     TSH; Future  -     Lipid Panel; Future    Screen for colon cancer    Postnasal drip  -     Hypertonic Nasal Wash (SINUS RINSE BOTTLE KIT) PACK; Wash sinus 1-3x daily prn sinus congestion or pressure  -     guaiFENesin (MUCINEX) 600 MG extended release tablet; Take 1 tablet by mouth 2 times daily Prn congestion  -     vitamin C (ASCORBIC ACID) 500 MG tablet; Take 1 tablet by mouth daily    Dyspepsia  -     H. Pylori Breath Test; Future  -     pantoprazole (PROTONIX) 40 MG tablet; Take one tab po every morning 30 min prior to eating or other medications  -     famotidine (PEPCID) 20 MG tablet; Take 1 tablet by mouth 2 times daily For 30 days then discontinue    Screening for diabetes mellitus  -     Hemoglobin A1C; Future  -     Comprehensive Metabolic Panel; Future    Preoperative clearance  -     CBC with Auto Differential; Future    Screening for hematuria or proteinuria  -     Urinalysis with Microscopic; Future    History of diarrhea    Epigastric pain      Nausea/epigastric pain/esophageal discomfort c/w gastritis; recommend H Pylori testing, pepcid bid and protonix daily; f/u in 2-3 weeks    HLD: update labs today for preop clearance/baseline testing;     Preop: she is cleared to proceed with abdominoplasty at this time    Diarrhea likely from foodborne cause, now resolved    Has pending appt 9/6 per pt with Gastro; she will defer colonoscopy until she has her  No stridor. No wheezing, rhonchi or rales.   Musculoskeletal:      Cervical back: Normal range of motion.   Neurological:      Mental Status: She is alert and oriented to person, place, and time.      Gait: Gait normal.   Psychiatric:         Mood and Affect: Mood normal.         Behavior: Behavior normal.         Thought Content: Thought content normal.         Judgment: Judgment normal.         PHQ-9 Total Score: 0 (8/28/2024  9:59 AM)            I have discussed the diagnosis with the patient and the intended plan as seen in the above orders.  The patient has received an After-Visit Summary and questions were answered concerning future plans.     An After Visit Summary was printed and given to the patient.    All diagnoses have been discussed with the patient and all of the patient's questions have been answered.           Patricia Tyler MD  [facility]  [address] [address]

## 2024-08-28 NOTE — PROGRESS NOTES
Kerrie Murdock is a 48 y.o. female (: 1975) presenting to address:    Chief Complaint   Patient presents with    Nausea     And head pain.       Vitals:    24 1001   BP: 120/75   Pulse: 83   Resp: 16   Temp: 98 °F (36.7 °C)   SpO2: 91%       Coordination of Care Questionaire:   1. \"Have you been to the ER, urgent care clinic since your last visit?  Hospitalized since your last visit?\" Yes    2. \"Have you seen or consulted any other health care providers outside of the [facility] since your last visit?\" No      3. For patients aged 45-75: Has the patient had a colonoscopy / FIT/ Cologuard? N/A      If the patient is female:    4. For patients aged 40-74: Has the patient had a mammogram within the past 2 years? No       5. For patients aged 21-65: Has the patient had a pap smear? N/A    Advanced Directive:   1. Do you have an Advanced Directive? N/A    2. Would you like information on Advanced Directives? N/a

## 2024-08-29 LAB
ALBUMIN SERPL-MCNC: 4.4 G/DL (ref 3.9–4.9)
ALP SERPL-CCNC: 40 IU/L (ref 44–121)
ALT SERPL-CCNC: 21 IU/L (ref 0–32)
APPEARANCE UR: CLEAR
AST SERPL-CCNC: 17 IU/L (ref 0–40)
BACTERIA #/AREA URNS HPF: NORMAL /[HPF]
BASOPHILS # BLD AUTO: 0 X10E3/UL (ref 0–0.2)
BASOPHILS NFR BLD AUTO: 1 %
BILIRUB SERPL-MCNC: 0.7 MG/DL (ref 0–1.2)
BILIRUB UR QL STRIP: NEGATIVE
BUN SERPL-MCNC: 7 MG/DL (ref 6–24)
BUN/CREAT SERPL: 13 (ref 9–23)
CALCIUM SERPL-MCNC: 9.5 MG/DL (ref 8.7–10.2)
CASTS URNS QL MICRO: NORMAL /LPF
CHLORIDE SERPL-SCNC: 103 MMOL/L (ref 96–106)
CHOLEST SERPL-MCNC: 176 MG/DL (ref 100–199)
CO2 SERPL-SCNC: 22 MMOL/L (ref 20–29)
COLOR UR: YELLOW
CREAT SERPL-MCNC: 0.53 MG/DL (ref 0.57–1)
EGFRCR SERPLBLD CKD-EPI 2021: 115 ML/MIN/1.73
EOSINOPHIL # BLD AUTO: 0.3 X10E3/UL (ref 0–0.4)
EOSINOPHIL NFR BLD AUTO: 6 %
EPI CELLS #/AREA URNS HPF: NORMAL /HPF (ref 0–10)
ERYTHROCYTE [DISTWIDTH] IN BLOOD BY AUTOMATED COUNT: 12.5 % (ref 11.7–15.4)
GLOBULIN SER CALC-MCNC: 2.7 G/DL (ref 1.5–4.5)
GLUCOSE SERPL-MCNC: 99 MG/DL (ref 70–99)
GLUCOSE UR QL STRIP: NEGATIVE
HBA1C MFR BLD: 5.3 % (ref 4.8–5.6)
HCT VFR BLD AUTO: 42 % (ref 34–46.6)
HDLC SERPL-MCNC: 46 MG/DL
HGB BLD-MCNC: 13.7 G/DL (ref 11.1–15.9)
HGB UR QL STRIP: NEGATIVE
IMM GRANULOCYTES # BLD AUTO: 0 X10E3/UL (ref 0–0.1)
IMM GRANULOCYTES NFR BLD AUTO: 0 %
KETONES UR QL STRIP: ABNORMAL
LDLC SERPL CALC-MCNC: 115 MG/DL (ref 0–99)
LEUKOCYTE ESTERASE UR QL STRIP: NEGATIVE
LYMPHOCYTES # BLD AUTO: 1.1 X10E3/UL (ref 0.7–3.1)
LYMPHOCYTES NFR BLD AUTO: 24 %
MCH RBC QN AUTO: 29.2 PG (ref 26.6–33)
MCHC RBC AUTO-ENTMCNC: 32.6 G/DL (ref 31.5–35.7)
MCV RBC AUTO: 90 FL (ref 79–97)
MICRO URNS: ABNORMAL
MICRO URNS: ABNORMAL
MONOCYTES # BLD AUTO: 0.4 X10E3/UL (ref 0.1–0.9)
MONOCYTES NFR BLD AUTO: 9 %
NEUTROPHILS # BLD AUTO: 2.8 X10E3/UL (ref 1.4–7)
NEUTROPHILS NFR BLD AUTO: 60 %
NITRITE UR QL STRIP: NEGATIVE
PH UR STRIP: 6 [PH] (ref 5–7.5)
PLATELET # BLD AUTO: 254 X10E3/UL (ref 150–450)
POTASSIUM SERPL-SCNC: 3.8 MMOL/L (ref 3.5–5.2)
PROT SERPL-MCNC: 7.1 G/DL (ref 6–8.5)
PROT UR QL STRIP: NEGATIVE
RBC # BLD AUTO: 4.69 X10E6/UL (ref 3.77–5.28)
RBC #/AREA URNS HPF: NORMAL /HPF (ref 0–2)
SODIUM SERPL-SCNC: 139 MMOL/L (ref 134–144)
SP GR UR STRIP: 1.02 (ref 1–1.03)
TRIGL SERPL-MCNC: 78 MG/DL (ref 0–149)
TSH SERPL DL<=0.005 MIU/L-ACNC: 2.41 UIU/ML (ref 0.45–4.5)
UREA BREATH TEST QL: POSITIVE
UROBILINOGEN UR STRIP-MCNC: 0.2 MG/DL (ref 0.2–1)
VLDLC SERPL CALC-MCNC: 15 MG/DL (ref 5–40)
WBC # BLD AUTO: 4.6 X10E3/UL (ref 3.4–10.8)
WBC #/AREA URNS HPF: NORMAL /HPF (ref 0–5)

## 2024-09-04 ENCOUNTER — TELEPHONE (OUTPATIENT)
Facility: CLINIC | Age: 49
End: 2024-09-04

## 2024-09-04 ENCOUNTER — TELEMEDICINE (OUTPATIENT)
Facility: CLINIC | Age: 49
End: 2024-09-04
Payer: MEDICAID

## 2024-09-04 DIAGNOSIS — A04.8 H. PYLORI INFECTION: Primary | ICD-10-CM

## 2024-09-04 DIAGNOSIS — E78.2 MIXED HYPERLIPIDEMIA: ICD-10-CM

## 2024-09-04 DIAGNOSIS — R74.8 LOW SERUM HDL: ICD-10-CM

## 2024-09-04 PROCEDURE — 99214 OFFICE O/P EST MOD 30 MIN: CPT | Performed by: FAMILY MEDICINE

## 2024-09-04 RX ORDER — CLARITHROMYCIN 500 MG
500 TABLET ORAL EVERY 12 HOURS
Qty: 28 TABLET | Refills: 0 | Status: SHIPPED | OUTPATIENT
Start: 2024-09-04 | End: 2024-09-18

## 2024-09-04 RX ORDER — METRONIDAZOLE 500 MG/1
500 TABLET ORAL EVERY 12 HOURS
Qty: 28 TABLET | Refills: 0 | Status: SHIPPED | OUTPATIENT
Start: 2024-09-04 | End: 2024-09-18

## 2024-09-04 RX ORDER — AMOXICILLIN 500 MG/1
1000 CAPSULE ORAL EVERY 12 HOURS
Qty: 56 CAPSULE | Refills: 0 | Status: SHIPPED | OUTPATIENT
Start: 2024-09-04 | End: 2024-09-18

## 2024-09-04 ASSESSMENT — ENCOUNTER SYMPTOMS
BLOOD IN STOOL: 0
CONSTIPATION: 0
NAUSEA: 0
VOMITING: 0

## 2024-09-04 NOTE — PROGRESS NOTES
Kerrie Murdock is a 49 y.o. female (: 1975) presenting to address:    Chief Complaint   Patient presents with    Follow-up       There were no vitals filed for this visit.    Coordination of Care Questionaire:   1. \"Have you been to the ER, urgent care clinic since your last visit?  Hospitalized since your last visit?\" No     2. \"Have you seen or consulted any other health care providers outside of the [facility] since your last visit?\" No      3. For patients aged 45-75: Has the patient had a colonoscopy / FIT/ Cologuard? No       If the patient is female:    4. For patients aged 40-74: Has the patient had a mammogram within the past 2 years? No       5. For patients aged 21-65: Has the patient had a pap smear? No     Advanced Directive:   1. Do you have an Advanced Directive? No     2. Would you like information on Advanced Directives? No   
      Family History   Problem Relation Age of Onset    Hypertension Mother     Diabetes Mother     No Known Problems Father         79 and healthy    No Known Problems Brother     Stroke Maternal Grandmother     Breast Problems Other         Mother's cousin    No Known Problems Half-Sister     No Known Problems Half-Brother     No Known Problems Half-Brother     No Known Problems Half-Brother     No Known Problems Half-Brother     No Known Problems Half-Brother           SH:  Social History     Tobacco Use    Smoking status: Never    Smokeless tobacco: Never   Vaping Use    Vaping status: Never Used   Substance Use Topics    Alcohol use: Yes     Alcohol/week: 1.0 standard drink of alcohol     Comment: 1-3 drinks/month    Drug use: Never       Medications/Allergies:  Current Outpatient Medications   Medication Sig Dispense Refill    omeprazole (PRILOSEC) 20 MG delayed release capsule Take 1 capsule by mouth 2 times daily (before meals) for 14 days 30 capsule 0    clarithromycin (BIAXIN) 500 MG tablet Take 1 tablet by mouth in the morning and 1 tablet in the evening. Do all this for 14 days. 28 tablet 0    metroNIDAZOLE (FLAGYL) 500 MG tablet Take 1 tablet by mouth in the morning and 1 tablet in the evening. Do all this for 14 days. 28 tablet 0    amoxicillin (AMOXIL) 500 MG capsule Take 2 capsules by mouth in the morning and 2 capsules in the evening. Do all this for 14 days. 56 capsule 0    Hypertonic Nasal Wash (SINUS RINSE BOTTLE KIT) PACK Wash sinus 1-3x daily prn sinus congestion or pressure 1 each 0    guaiFENesin (MUCINEX) 600 MG extended release tablet Take 1 tablet by mouth 2 times daily Prn congestion 60 tablet 0    vitamin C (ASCORBIC ACID) 500 MG tablet Take 1 tablet by mouth daily 90 tablet 3    pantoprazole (PROTONIX) 40 MG tablet Take one tab po every morning 30 min prior to eating or other medications 90 tablet 2    famotidine (PEPCID) 20 MG tablet Take 1 tablet by mouth 2 times daily For 30 days

## 2024-09-25 ENCOUNTER — TELEMEDICINE (OUTPATIENT)
Facility: CLINIC | Age: 49
End: 2024-09-25
Payer: MEDICAID

## 2024-09-25 DIAGNOSIS — Z12.11 SCREEN FOR COLON CANCER: ICD-10-CM

## 2024-09-25 DIAGNOSIS — R11.0 NAUSEA: ICD-10-CM

## 2024-09-25 DIAGNOSIS — A04.8 H. PYLORI INFECTION: Primary | ICD-10-CM

## 2024-09-25 PROCEDURE — 99214 OFFICE O/P EST MOD 30 MIN: CPT | Performed by: FAMILY MEDICINE

## 2024-09-25 RX ORDER — ONDANSETRON 4 MG/1
4 TABLET, ORALLY DISINTEGRATING ORAL 3 TIMES DAILY PRN
Qty: 21 TABLET | Refills: 0 | Status: SHIPPED | OUTPATIENT
Start: 2024-09-25

## 2024-09-25 ASSESSMENT — ENCOUNTER SYMPTOMS
NAUSEA: 1
ABDOMINAL PAIN: 1
VOMITING: 0
TROUBLE SWALLOWING: 0
DIARRHEA: 0

## 2024-09-25 ASSESSMENT — PATIENT HEALTH QUESTIONNAIRE - PHQ9
SUM OF ALL RESPONSES TO PHQ QUESTIONS 1-9: 0
SUM OF ALL RESPONSES TO PHQ9 QUESTIONS 1 & 2: 0
SUM OF ALL RESPONSES TO PHQ QUESTIONS 1-9: 0
2. FEELING DOWN, DEPRESSED OR HOPELESS: NOT AT ALL
1. LITTLE INTEREST OR PLEASURE IN DOING THINGS: NOT AT ALL
SUM OF ALL RESPONSES TO PHQ QUESTIONS 1-9: 0
SUM OF ALL RESPONSES TO PHQ QUESTIONS 1-9: 0

## 2024-11-03 DIAGNOSIS — A04.8 H. PYLORI INFECTION: ICD-10-CM

## 2024-11-18 ENCOUNTER — TELEMEDICINE (OUTPATIENT)
Facility: CLINIC | Age: 49
End: 2024-11-18
Payer: MEDICAID

## 2024-11-18 DIAGNOSIS — R53.81 MALAISE: ICD-10-CM

## 2024-11-18 DIAGNOSIS — R30.0 DYSURIA: Primary | ICD-10-CM

## 2024-11-18 PROCEDURE — 99213 OFFICE O/P EST LOW 20 MIN: CPT | Performed by: FAMILY MEDICINE

## 2024-11-18 RX ORDER — SULFAMETHOXAZOLE AND TRIMETHOPRIM 800; 160 MG/1; MG/1
1 TABLET ORAL 2 TIMES DAILY
Qty: 14 TABLET | Refills: 0 | Status: SHIPPED | OUTPATIENT
Start: 2024-11-18 | End: 2024-11-22

## 2024-11-18 RX ORDER — PHENAZOPYRIDINE HYDROCHLORIDE 100 MG/1
100 TABLET, FILM COATED ORAL 3 TIMES DAILY PRN
Qty: 6 TABLET | Refills: 0 | Status: SHIPPED | OUTPATIENT
Start: 2024-11-18 | End: 2024-11-20

## 2024-11-18 ASSESSMENT — PATIENT HEALTH QUESTIONNAIRE - PHQ9
1. LITTLE INTEREST OR PLEASURE IN DOING THINGS: NOT AT ALL
SUM OF ALL RESPONSES TO PHQ QUESTIONS 1-9: 0
2. FEELING DOWN, DEPRESSED OR HOPELESS: NOT AT ALL
SUM OF ALL RESPONSES TO PHQ9 QUESTIONS 1 & 2: 0
SUM OF ALL RESPONSES TO PHQ QUESTIONS 1-9: 0

## 2024-11-18 ASSESSMENT — ENCOUNTER SYMPTOMS: ABDOMINAL PAIN: 1
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
## 2024-11-19 LAB
APPEARANCE UR: CLEAR
BACTERIA #/AREA URNS HPF: ABNORMAL /[HPF]
BILIRUB UR QL STRIP: NEGATIVE
CASTS URNS QL MICRO: ABNORMAL /LPF
COLOR UR: YELLOW
EPI CELLS #/AREA URNS HPF: ABNORMAL /HPF (ref 0–10)
GLUCOSE UR QL STRIP: NEGATIVE
HGB UR QL STRIP: NEGATIVE
KETONES UR QL STRIP: NEGATIVE
LEUKOCYTE ESTERASE UR QL STRIP: ABNORMAL
MICRO URNS: ABNORMAL
NITRITE UR QL STRIP: NEGATIVE
PH UR STRIP: 6.5 [PH] (ref 5–7.5)
PROT UR QL STRIP: NEGATIVE
RBC #/AREA URNS HPF: ABNORMAL /HPF (ref 0–2)
SP GR UR STRIP: <=1.005 (ref 1–1.03)
UROBILINOGEN UR STRIP-MCNC: 0.2 MG/DL (ref 0.2–1)
WBC #/AREA URNS HPF: ABNORMAL /HPF (ref 0–5)

## 2024-11-22 ENCOUNTER — TELEMEDICINE (OUTPATIENT)
Facility: CLINIC | Age: 49
End: 2024-11-22
Payer: MEDICAID

## 2024-11-22 DIAGNOSIS — N30.00 ACUTE CYSTITIS WITHOUT HEMATURIA: Primary | ICD-10-CM

## 2024-11-22 LAB — BACTERIA UR CULT: ABNORMAL

## 2024-11-22 PROCEDURE — 99213 OFFICE O/P EST LOW 20 MIN: CPT | Performed by: FAMILY MEDICINE

## 2024-11-22 RX ORDER — LEVOFLOXACIN 750 MG/1
750 TABLET, FILM COATED ORAL DAILY
Qty: 7 TABLET | Refills: 0 | Status: SHIPPED | OUTPATIENT
Start: 2024-11-22 | End: 2024-11-29

## 2024-11-22 RX ORDER — PHENAZOPYRIDINE HYDROCHLORIDE 100 MG/1
100 TABLET, FILM COATED ORAL 3 TIMES DAILY PRN
Qty: 6 TABLET | Refills: 0 | Status: SHIPPED | OUTPATIENT
Start: 2024-11-22 | End: 2024-11-24

## 2024-11-22 ASSESSMENT — ENCOUNTER SYMPTOMS: ABDOMINAL PAIN: 1

## 2024-11-22 NOTE — PROGRESS NOTES
[address] [place] [address]               [phone]        Kerrie Murdock is a 49 y.o. female and presents with Follow-up           Assessment/Plan:  Kerrie was seen today for follow-up.    Diagnoses and all orders for this visit:    Acute cystitis without hematuria  -     levoFLOXacin (LEVAQUIN) 750 MG tablet; Take 1 tablet by mouth daily for 7 days  -     phenazopyridine (PYRIDIUM) 100 MG tablet; Take 1 tablet by mouth 3 times daily as needed for Pain Urine may turn to orange or red color with use      No prior culture sensitivities to review; awaiting sensitivity from the current urine culture done; switch to Levaquin daily given clinical picture has not improved; vaginal pain but no rash per pt and denies itching; restart pyridium; f/u in 1 week if no improvement      Follow up and disposition:   Return in about 1 week (around 11/29/2024), or if symptoms worsen or fail to improve, for follow up -15 min.      Health Maintenance:   Health Maintenance   Topic Date Due    Hepatitis B vaccine (1 of 3 - 19+ 3-dose series) Never done    Colorectal Cancer Screen  Never done    DTaP/Tdap/Td vaccine (2 - Td or Tdap) 03/13/2024    Flu vaccine (1) Never done    COVID-19 Vaccine (3 - 2023-24 season) 09/01/2024    Depression Screen  11/18/2025    Breast cancer screen  06/04/2026    Lipids  08/28/2029    Hepatitis C screen  Completed    HIV screen  Completed    Hepatitis A vaccine  Aged Out    Hib vaccine  Aged Out    Polio vaccine  Aged Out    Meningococcal (ACWY) vaccine  Aged Out    Pneumococcal 0-64 years Vaccine  Aged Out    Cervical cancer screen  Discontinued        Subjective   48 y/o female here follow up  Headache/dysuria and fevers despite antibiotic  E Coli noted on UA, awaiting for antibiotic report; still burning and having pain with urination; pelvic pressure still present and has had subjective warmth still; has completed a few days of the antibiotic already

## 2024-12-02 ENCOUNTER — OFFICE VISIT (OUTPATIENT)
Facility: CLINIC | Age: 49
End: 2024-12-02
Payer: MEDICAID

## 2024-12-02 VITALS
HEART RATE: 63 BPM | HEIGHT: 65 IN | BODY MASS INDEX: 30.95 KG/M2 | SYSTOLIC BLOOD PRESSURE: 111 MMHG | DIASTOLIC BLOOD PRESSURE: 76 MMHG | RESPIRATION RATE: 12 BRPM

## 2024-12-02 DIAGNOSIS — R07.89 ATYPICAL CHEST PAIN: ICD-10-CM

## 2024-12-02 DIAGNOSIS — Z12.11 SCREEN FOR COLON CANCER: ICD-10-CM

## 2024-12-02 DIAGNOSIS — Z87.440 HISTORY OF UTI: ICD-10-CM

## 2024-12-02 DIAGNOSIS — Z28.21 INFLUENZA VACCINATION DECLINED: ICD-10-CM

## 2024-12-02 DIAGNOSIS — R30.0 DYSURIA: Primary | ICD-10-CM

## 2024-12-02 PROCEDURE — 99214 OFFICE O/P EST MOD 30 MIN: CPT | Performed by: FAMILY MEDICINE

## 2024-12-02 ASSESSMENT — ENCOUNTER SYMPTOMS
ABDOMINAL PAIN: 1
CONSTIPATION: 1

## 2024-12-02 NOTE — PROGRESS NOTES
[address] [place] [address]               [phone]        Kerrie Murdock is a 49 y.o. female and presents with Follow-up           Assessment/Plan:  Kerrie was seen today for follow-up.    Diagnoses and all orders for this visit:    Dysuria    History of UTI    Screen for colon cancer    Influenza vaccination declined    Atypical chest pain      Dysuria: pending appt tomorrow with GYN; symptoms have improved  UTI resolved; normal follow up testing  Screening colonoscopy overdue, advised to call Gastro to schedule  Advised to come in for timely evaluation for chest pain; reproducible per pt with improvement with ibuprofen; associated hot flashes; continue to monitor  Encouraged to increase fiber for LLQ pain that comes/goes      Follow up and disposition:   Return in about 3 months (around 3/2/2025) for follow up -15 min.      Health Maintenance:   Health Maintenance   Topic Date Due    Hepatitis B vaccine (1 of 3 - 19+ 3-dose series) Never done    Colorectal Cancer Screen  Never done    DTaP/Tdap/Td vaccine (2 - Td or Tdap) 03/13/2024    Flu vaccine (1) Never done    COVID-19 Vaccine (3 - 2023-24 season) 09/01/2024    Depression Screen  11/18/2025    Breast cancer screen  06/04/2026    Lipids  08/28/2029    Hepatitis C screen  Completed    HIV screen  Completed    Hepatitis A vaccine  Aged Out    Hib vaccine  Aged Out    Polio vaccine  Aged Out    Meningococcal (ACWY) vaccine  Aged Out    Pneumococcal 0-64 years Vaccine  Aged Out    Cervical cancer screen  Discontinued        Subjective   48 y/o female here for f/u on UTI    Prescribed alternative antibiotic 11/22/2024 due to concerns but urine culture demonstrated pansensitive E Coli; seen by ER 11/24/2024, testing negative for clue cells/yeast/UTI; still c/o dysuria; treated with diflucan for vulvovaginal candidiasis for dryness/redness provider saw on exam per pt; has been using [employer] and thinks

## 2024-12-12 PROBLEM — N95.2 ATROPHIC VAGINITIS: Status: ACTIVE | Noted: 2024-12-12

## 2025-01-01 NOTE — PROGRESS NOTES
[address] [place] [address]               [phone]        Kerrie Murdock is a 49 y.o. female and presents with Follow-up           Assessment/Plan:  Kerrie was seen today for follow-up.    Diagnoses and all orders for this visit:    Bladder wall thickening  -     [facility]    Dysuria  -     [facility]    Neutropenia, unspecified type (HCC)  -     CBC with Auto Differential; Future    Fatty liver      Refer to Urology for bladder wall thickening; dysuria has improved with premarin use but notes persistent lower pelvic pain intermittently hours after urinating; f/u after consult; UA reassuring for no further UTI  Neutropenia: repeat cbc in March; asymptomatic  Elevated LFTs likely due to fatty liver; plans to work on dietary changes to reduce her cholesterol      Follow up and disposition:   Return in about 2 months (around 3/4/2025), or if symptoms worsen or fail to improve, for labs 11:30, follow up -15 min, review lab results 3/11/25 11 am.      Health Maintenance:   Health Maintenance   Topic Date Due    Hepatitis B vaccine (1 of 3 - 19+ 3-dose series) Never done    Colorectal Cancer Screen  Never done    DTaP/Tdap/Td vaccine (2 - Td or Tdap) 03/13/2024    Flu vaccine (1) Never done    COVID-19 Vaccine (3 - 2023-24 season) 09/01/2024    Depression Screen  11/18/2025    Breast cancer screen  06/04/2026    Lipids  08/28/2029    Hepatitis C screen  Completed    HIV screen  Completed    Hepatitis A vaccine  Aged Out    Hib vaccine  Aged Out    Polio vaccine  Aged Out    Meningococcal (ACWY) vaccine  Aged Out    Pneumococcal 0-64 years Vaccine  Aged Out    Cervical cancer screen  Discontinued        Subjective     50 y/o female here for follow up on dysuria    Had appt with GYN for dysuria and started on premarin 3x/week for atrophic vaginitis 12/4; states she noted microscopic hematuria with POC; states she no

## 2025-01-02 ENCOUNTER — TELEPHONE (OUTPATIENT)
Facility: CLINIC | Age: 50
End: 2025-01-02

## 2025-01-02 ENCOUNTER — TELEMEDICINE (OUTPATIENT)
Facility: CLINIC | Age: 50
End: 2025-01-02
Payer: MEDICAID

## 2025-01-02 DIAGNOSIS — K76.0 FATTY LIVER: ICD-10-CM

## 2025-01-02 DIAGNOSIS — D70.9 NEUTROPENIA, UNSPECIFIED TYPE (HCC): ICD-10-CM

## 2025-01-02 DIAGNOSIS — N32.89 BLADDER WALL THICKENING: Primary | ICD-10-CM

## 2025-01-02 DIAGNOSIS — R30.0 DYSURIA: ICD-10-CM

## 2025-01-02 PROCEDURE — 99214 OFFICE O/P EST MOD 30 MIN: CPT | Performed by: FAMILY MEDICINE

## 2025-01-02 SDOH — ECONOMIC STABILITY: FOOD INSECURITY: WITHIN THE PAST 12 MONTHS, THE FOOD YOU BOUGHT JUST DIDN'T LAST AND YOU DIDN'T HAVE MONEY TO GET MORE.: NEVER TRUE

## 2025-01-02 SDOH — ECONOMIC STABILITY: FOOD INSECURITY: WITHIN THE PAST 12 MONTHS, YOU WORRIED THAT YOUR FOOD WOULD RUN OUT BEFORE YOU GOT MONEY TO BUY MORE.: NEVER TRUE

## 2025-01-02 SDOH — ECONOMIC STABILITY: INCOME INSECURITY: HOW HARD IS IT FOR YOU TO PAY FOR THE VERY BASICS LIKE FOOD, HOUSING, MEDICAL CARE, AND HEATING?: NOT HARD AT ALL

## 2025-01-02 ASSESSMENT — PATIENT HEALTH QUESTIONNAIRE - PHQ9
1. LITTLE INTEREST OR PLEASURE IN DOING THINGS: NOT AT ALL
SUM OF ALL RESPONSES TO PHQ QUESTIONS 1-9: 0
SUM OF ALL RESPONSES TO PHQ QUESTIONS 1-9: 0
SUM OF ALL RESPONSES TO PHQ9 QUESTIONS 1 & 2: 0
SUM OF ALL RESPONSES TO PHQ QUESTIONS 1-9: 0
SUM OF ALL RESPONSES TO PHQ QUESTIONS 1-9: 0
2. FEELING DOWN, DEPRESSED OR HOPELESS: NOT AT ALL

## 2025-01-02 ASSESSMENT — ENCOUNTER SYMPTOMS: BACK PAIN: 0

## 2025-01-02 NOTE — TELEPHONE ENCOUNTER
Please add pt to lab schedule 3/4/2025 11:30 for nonfasting lab  Schedule 15 min follow up 3/11/2025 11 am follow up Urology/neutropenia

## 2025-01-15 ENCOUNTER — COMMUNITY OUTREACH (OUTPATIENT)
Facility: CLINIC | Age: 50
End: 2025-01-15

## 2025-01-15 NOTE — PROGRESS NOTES
Patient's HM shows they are overdue for Colorectal Screening.   Care Everywhere and  files searched.  No results to attach to order nor HM updated.     Patient declined

## 2025-03-05 LAB
BASOPHILS # BLD AUTO: 0 X10E3/UL (ref 0–0.2)
BASOPHILS NFR BLD AUTO: 1 %
EOSINOPHIL # BLD AUTO: 0.3 X10E3/UL (ref 0–0.4)
EOSINOPHIL NFR BLD AUTO: 6 %
ERYTHROCYTE [DISTWIDTH] IN BLOOD BY AUTOMATED COUNT: 12.5 % (ref 11.7–15.4)
HCT VFR BLD AUTO: 43.6 % (ref 34–46.6)
HGB BLD-MCNC: 14.6 G/DL (ref 11.1–15.9)
IMM GRANULOCYTES # BLD AUTO: 0 X10E3/UL (ref 0–0.1)
IMM GRANULOCYTES NFR BLD AUTO: 0 %
LYMPHOCYTES # BLD AUTO: 1.5 X10E3/UL (ref 0.7–3.1)
LYMPHOCYTES NFR BLD AUTO: 26 %
MCH RBC QN AUTO: 30.7 PG (ref 26.6–33)
MCHC RBC AUTO-ENTMCNC: 33.5 G/DL (ref 31.5–35.7)
MCV RBC AUTO: 92 FL (ref 79–97)
MONOCYTES # BLD AUTO: 0.5 X10E3/UL (ref 0.1–0.9)
MONOCYTES NFR BLD AUTO: 8 %
NEUTROPHILS # BLD AUTO: 3.4 X10E3/UL (ref 1.4–7)
NEUTROPHILS NFR BLD AUTO: 59 %
PLATELET # BLD AUTO: 230 X10E3/UL (ref 150–450)
RBC # BLD AUTO: 4.76 X10E6/UL (ref 3.77–5.28)
WBC # BLD AUTO: 5.8 X10E3/UL (ref 3.4–10.8)

## 2025-03-10 ENCOUNTER — OFFICE VISIT (OUTPATIENT)
Facility: CLINIC | Age: 50
End: 2025-03-10
Payer: MEDICAID

## 2025-03-10 VITALS
HEIGHT: 65 IN | DIASTOLIC BLOOD PRESSURE: 82 MMHG | WEIGHT: 184 LBS | HEART RATE: 74 BPM | SYSTOLIC BLOOD PRESSURE: 136 MMHG | RESPIRATION RATE: 16 BRPM | TEMPERATURE: 98.4 F | OXYGEN SATURATION: 96 % | BODY MASS INDEX: 30.66 KG/M2

## 2025-03-10 DIAGNOSIS — E78.2 MIXED HYPERLIPIDEMIA: ICD-10-CM

## 2025-03-10 DIAGNOSIS — E66.811 CLASS 1 OBESITY DUE TO EXCESS CALORIES WITH SERIOUS COMORBIDITY AND BODY MASS INDEX (BMI) OF 31.0 TO 31.9 IN ADULT: ICD-10-CM

## 2025-03-10 DIAGNOSIS — E66.09 CLASS 1 OBESITY DUE TO EXCESS CALORIES WITH SERIOUS COMORBIDITY AND BODY MASS INDEX (BMI) OF 31.0 TO 31.9 IN ADULT: ICD-10-CM

## 2025-03-10 DIAGNOSIS — F41.9 ANXIETY: ICD-10-CM

## 2025-03-10 DIAGNOSIS — K76.0 FATTY LIVER: Primary | ICD-10-CM

## 2025-03-10 DIAGNOSIS — N95.1 MENOPAUSAL SYMPTOMS: ICD-10-CM

## 2025-03-10 DIAGNOSIS — F32.9 REACTIVE DEPRESSION: ICD-10-CM

## 2025-03-10 PROCEDURE — 99214 OFFICE O/P EST MOD 30 MIN: CPT | Performed by: FAMILY MEDICINE

## 2025-03-10 SDOH — ECONOMIC STABILITY: FOOD INSECURITY: WITHIN THE PAST 12 MONTHS, YOU WORRIED THAT YOUR FOOD WOULD RUN OUT BEFORE YOU GOT MONEY TO BUY MORE.: NEVER TRUE

## 2025-03-10 SDOH — ECONOMIC STABILITY: FOOD INSECURITY: WITHIN THE PAST 12 MONTHS, THE FOOD YOU BOUGHT JUST DIDN'T LAST AND YOU DIDN'T HAVE MONEY TO GET MORE.: NEVER TRUE

## 2025-03-10 ASSESSMENT — PATIENT HEALTH QUESTIONNAIRE - PHQ9
SUM OF ALL RESPONSES TO PHQ QUESTIONS 1-9: 1
5. POOR APPETITE OR OVEREATING: NOT AT ALL
SUM OF ALL RESPONSES TO PHQ QUESTIONS 1-9: 8
SUM OF ALL RESPONSES TO PHQ QUESTIONS 1-9: 8
9. THOUGHTS THAT YOU WOULD BE BETTER OFF DEAD, OR OF HURTING YOURSELF: NOT AT ALL
4. FEELING TIRED OR HAVING LITTLE ENERGY: NEARLY EVERY DAY
SUM OF ALL RESPONSES TO PHQ QUESTIONS 1-9: 1
7. TROUBLE CONCENTRATING ON THINGS, SUCH AS READING THE NEWSPAPER OR WATCHING TELEVISION: NOT AT ALL
1. LITTLE INTEREST OR PLEASURE IN DOING THINGS: SEVERAL DAYS
SUM OF ALL RESPONSES TO PHQ QUESTIONS 1-9: 1
8. MOVING OR SPEAKING SO SLOWLY THAT OTHER PEOPLE COULD HAVE NOTICED. OR THE OPPOSITE, BEING SO FIGETY OR RESTLESS THAT YOU HAVE BEEN MOVING AROUND A LOT MORE THAN USUAL: NOT AT ALL
1. LITTLE INTEREST OR PLEASURE IN DOING THINGS: SEVERAL DAYS
10. IF YOU CHECKED OFF ANY PROBLEMS, HOW DIFFICULT HAVE THESE PROBLEMS MADE IT FOR YOU TO DO YOUR WORK, TAKE CARE OF THINGS AT HOME, OR GET ALONG WITH OTHER PEOPLE: NOT DIFFICULT AT ALL
SUM OF ALL RESPONSES TO PHQ QUESTIONS 1-9: 8
SUM OF ALL RESPONSES TO PHQ QUESTIONS 1-9: 1
2. FEELING DOWN, DEPRESSED OR HOPELESS: SEVERAL DAYS
DEPRESSION UNABLE TO ASSESS: FUNCTIONAL CAPACITY MOTIVATION LIMITS ACCURACY
6. FEELING BAD ABOUT YOURSELF - OR THAT YOU ARE A FAILURE OR HAVE LET YOURSELF OR YOUR FAMILY DOWN: NOT AT ALL
SUM OF ALL RESPONSES TO PHQ QUESTIONS 1-9: 8
2. FEELING DOWN, DEPRESSED OR HOPELESS: NOT AT ALL
3. TROUBLE FALLING OR STAYING ASLEEP: NEARLY EVERY DAY

## 2025-03-10 ASSESSMENT — ENCOUNTER SYMPTOMS: SHORTNESS OF BREATH: 0

## 2025-03-10 ASSESSMENT — ANXIETY QUESTIONNAIRES
IF YOU CHECKED OFF ANY PROBLEMS ON THIS QUESTIONNAIRE, HOW DIFFICULT HAVE THESE PROBLEMS MADE IT FOR YOU TO DO YOUR WORK, TAKE CARE OF THINGS AT HOME, OR GET ALONG WITH OTHER PEOPLE: SOMEWHAT DIFFICULT
1. FEELING NERVOUS, ANXIOUS, OR ON EDGE: SEVERAL DAYS
5. BEING SO RESTLESS THAT IT IS HARD TO SIT STILL: SEVERAL DAYS
4. TROUBLE RELAXING: SEVERAL DAYS
6. BECOMING EASILY ANNOYED OR IRRITABLE: SEVERAL DAYS
7. FEELING AFRAID AS IF SOMETHING AWFUL MIGHT HAPPEN: SEVERAL DAYS
GAD7 TOTAL SCORE: 6
3. WORRYING TOO MUCH ABOUT DIFFERENT THINGS: SEVERAL DAYS
2. NOT BEING ABLE TO STOP OR CONTROL WORRYING: NOT AT ALL

## 2025-03-10 NOTE — PROGRESS NOTES
Kerrie Murdock is a 49 y.o. female (: 1975) presenting to address:    Chief Complaint   Patient presents with    Follow-up     Patient thought she went through Menopause already.       Vitals:    03/10/25 1147   BP: (!) 146/84   Pulse: 74   Resp: 16   Temp: 98.4 °F (36.9 °C)   SpO2: 96%       \"Have you been to the ER, urgent care clinic since your last visit?  Hospitalized since your last visit?\"    NO    “Have you seen or consulted any other health care providers outside of [facility] since your last visit?”    Yes, Urology    “Have you had a colorectal cancer screening such as a colonoscopy/FIT/Cologuard?    NO    No colonoscopy on file  No cologuard on file  No FIT/FOBT on file   No flexible sigmoidoscopy on file

## 2025-03-10 NOTE — PROGRESS NOTES
[address] [place] [address]               [phone]        Kerrie Murdock is a 49 y.o. female and presents with Follow-up (Patient thought she went through Menopause already.)           Assessment/Plan:  Kerrie was seen today for follow-up.    Diagnoses and all orders for this visit:    Fatty liver    Class 1 obesity due to excess calories with serious comorbidity and body mass index (BMI) of 31.0 to 31.9 in adult    Mixed hyperlipidemia    Menopausal symptoms    Anxiety  -     External Referral To Psychology    Reactive depression  -     External Referral To Psychology      Neutropenia resolved  Fatty liver: continue dietary changes/weight loss  Obesity: congratulated on weight loss  HLD: continue dietary changes  Menopausal symptoms/anxiety and depression/bereavement; reviewed dx; recommend therapy with [facility]; referral updated; offered lexapro; declined due to side effect profile; f/u at next appt in 4 weeks      Follow up and disposition:   Return in about 4 weeks (around 4/7/2025), or if symptoms worsen or fail to improve, for follow up -15 min.      Health Maintenance:   Health Maintenance   Topic Date Due    Hepatitis B vaccine (1 of 3 - 19+ 3-dose series) Never done    Colorectal Cancer Screen  Never done    DTaP/Tdap/Td vaccine (2 - Td or Tdap) 03/13/2024    Flu vaccine (1) Never done    COVID-19 Vaccine (3 - 2024-25 season) 09/01/2024    Depression Monitoring  01/02/2026    Breast cancer screen  06/04/2026    Lipids  08/28/2029    Hepatitis C screen  Completed    HIV screen  Completed    Hepatitis A vaccine  Aged Out    Hib vaccine  Aged Out    Polio vaccine  Aged Out    Meningococcal (ACWY) vaccine  Aged Out    Meningococcal B vaccine  Aged Out    Pneumococcal 0-49 years Vaccine  Aged Out    Depression Screen  Discontinued    Cervical cancer screen  Discontinued        Subjective   50 y/o female here for follow up  Completed cystoscopy 3/8/2025 and was

## 2025-05-30 ENCOUNTER — TELEMEDICINE (OUTPATIENT)
Facility: CLINIC | Age: 50
End: 2025-05-30
Payer: MEDICAID

## 2025-05-30 DIAGNOSIS — Z12.11 SCREEN FOR COLON CANCER: ICD-10-CM

## 2025-05-30 DIAGNOSIS — F32.9 REACTIVE DEPRESSION: ICD-10-CM

## 2025-05-30 DIAGNOSIS — E55.9 VITAMIN D DEFICIENCY: ICD-10-CM

## 2025-05-30 DIAGNOSIS — M62.838 MUSCLE SPASM: ICD-10-CM

## 2025-05-30 DIAGNOSIS — M79.601 PAIN OF RIGHT UPPER EXTREMITY: ICD-10-CM

## 2025-05-30 DIAGNOSIS — K76.0 FATTY LIVER: ICD-10-CM

## 2025-05-30 DIAGNOSIS — F41.9 ANXIETY: Primary | ICD-10-CM

## 2025-05-30 DIAGNOSIS — Z13.0 SCREENING FOR DEFICIENCY ANEMIA: ICD-10-CM

## 2025-05-30 DIAGNOSIS — M54.9 UPPER BACK PAIN: ICD-10-CM

## 2025-05-30 DIAGNOSIS — Z13.89 SCREENING FOR HEMATURIA OR PROTEINURIA: ICD-10-CM

## 2025-05-30 PROCEDURE — 99214 OFFICE O/P EST MOD 30 MIN: CPT | Performed by: FAMILY MEDICINE

## 2025-05-30 RX ORDER — CYCLOBENZAPRINE HCL 5 MG
5 TABLET ORAL NIGHTLY PRN
Qty: 10 TABLET | Refills: 0 | Status: SHIPPED | OUTPATIENT
Start: 2025-05-30 | End: 2025-06-09

## 2025-05-30 ASSESSMENT — PATIENT HEALTH QUESTIONNAIRE - PHQ9
SUM OF ALL RESPONSES TO PHQ QUESTIONS 1-9: 0
1. LITTLE INTEREST OR PLEASURE IN DOING THINGS: NOT AT ALL
2. FEELING DOWN, DEPRESSED OR HOPELESS: NOT AT ALL
SUM OF ALL RESPONSES TO PHQ QUESTIONS 1-9: 0

## 2025-05-30 NOTE — PROGRESS NOTES
Kerrie Murdock is a 49 y.o. female (: 1975) presenting to address:    Chief Complaint   Patient presents with    Follow-up     Questions for PCP       There were no vitals filed for this visit.    \"Have you been to the ER, urgent care clinic since your last visit?  Hospitalized since your last visit?\"    NO    “Have you seen or consulted any other health care providers outside of [facility] since your last visit?”    NO    “Have you had a colorectal cancer screening such as a colonoscopy/FIT/Cologuard?    Scheduled     No colonoscopy on file  No cologuard on file  No FIT/FOBT on file   No flexible sigmoidoscopy on file

## 2025-05-30 NOTE — PROGRESS NOTES
[address] [place] [id number]               [phone]        Kerrie Murdock is a 49 y.o. female and presents with Follow-up (Questions for PCP- [facility])           Assessment/Plan:  Kerrie was seen today for follow-up.    Diagnoses and all orders for this visit:    Anxiety    Reactive depression    Screen for colon cancer  -     Cologuard (Fecal DNA Colorectal Cancer Screening)    Fatty liver  -     TSH; Future  -     Lipid Panel; Future  -     Hemoglobin A1C; Future  -     Comprehensive Metabolic Panel; Future    Screening for deficiency anemia  -     CBC with Auto Differential; Future    Screening for hematuria or proteinuria  -     Urinalysis with Microscopic; Future    Vitamin D deficiency  -     Vitamin D 25 Hydroxy; Future    Muscle spasm  -     cyclobenzaprine (FLEXERIL) 5 MG tablet; Take 1 tablet by mouth nightly as needed for Muscle spasms    Upper back pain  -     cyclobenzaprine (FLEXERIL) 5 MG tablet; Take 1 tablet by mouth nightly as needed for Muscle spasms    Pain of right upper extremity  -     cyclobenzaprine (FLEXERIL) 5 MG tablet; Take 1 tablet by mouth nightly as needed for Muscle spasms      Anxiety/depression well controlled; continue therapy/counseling; has noted myalgias recently which may be stress or positional; encouraged heating pads/stretching/stress reduction and muscle relaxer for pain to R arm and upper back  Still has not been able to complete colonoscopy--cologuard ordered instead; advised to complete prior to our appt  Screening labs ordered for pt to perform prior to our upcoming appt      Follow up and disposition:   Return in about 11 weeks (around 8/15/2025), or if symptoms worsen or fail to improve, for labs, physical 30 min.      Health Maintenance:   Health Maintenance   Topic Date Due    Hepatitis B vaccine (1 of 3 - 19+ 3-dose series) Never done    Colorectal Cancer Screen  Never done    COVID-19 Vaccine (3 -